# Patient Record
Sex: MALE | Race: WHITE | NOT HISPANIC OR LATINO
[De-identification: names, ages, dates, MRNs, and addresses within clinical notes are randomized per-mention and may not be internally consistent; named-entity substitution may affect disease eponyms.]

---

## 2017-01-16 ENCOUNTER — RX RENEWAL (OUTPATIENT)
Age: 77
End: 2017-01-16

## 2017-02-15 ENCOUNTER — APPOINTMENT (OUTPATIENT)
Dept: INTERNAL MEDICINE | Facility: CLINIC | Age: 77
End: 2017-02-15

## 2017-02-15 ENCOUNTER — LABORATORY RESULT (OUTPATIENT)
Age: 77
End: 2017-02-15

## 2017-02-15 VITALS
SYSTOLIC BLOOD PRESSURE: 130 MMHG | WEIGHT: 180 LBS | DIASTOLIC BLOOD PRESSURE: 78 MMHG | HEIGHT: 66.5 IN | BODY MASS INDEX: 28.59 KG/M2 | HEART RATE: 70 BPM

## 2017-02-16 ENCOUNTER — RESULT REVIEW (OUTPATIENT)
Age: 77
End: 2017-02-16

## 2017-02-16 LAB
ALBUMIN SERPL ELPH-MCNC: 4.2 G/DL
ALP BLD-CCNC: 45 U/L
ALT SERPL-CCNC: 30 U/L
ANION GAP SERPL CALC-SCNC: 16 MMOL/L
AST SERPL-CCNC: 21 U/L
BASOPHILS # BLD AUTO: 0.2 K/UL
BASOPHILS NFR BLD AUTO: 2.7 %
BILIRUB SERPL-MCNC: 0.4 MG/DL
BUN SERPL-MCNC: 22 MG/DL
CALCIUM SERPL-MCNC: 9.8 MG/DL
CHLORIDE SERPL-SCNC: 102 MMOL/L
CHOLEST SERPL-MCNC: 148 MG/DL
CHOLEST/HDLC SERPL: 4.4 RATIO
CO2 SERPL-SCNC: 23 MMOL/L
CREAT SERPL-MCNC: 0.96 MG/DL
EOSINOPHIL # BLD AUTO: 0.27 K/UL
EOSINOPHIL NFR BLD AUTO: 3.6 %
GLUCOSE SERPL-MCNC: 130 MG/DL
HBA1C MFR BLD HPLC: 8.9 %
HCT VFR BLD CALC: 40.9 %
HDLC SERPL-MCNC: 34 MG/DL
HGB BLD-MCNC: 13.7 G/DL
LDLC SERPL CALC-MCNC: 73 MG/DL
LYMPHOCYTES # BLD AUTO: 1.4 K/UL
LYMPHOCYTES NFR BLD AUTO: 18.9 %
MAGNESIUM SERPL-MCNC: 1.7 MG/DL
MAN DIFF?: NORMAL
MCHC RBC-ENTMCNC: 29.1 PG
MCHC RBC-ENTMCNC: 33.5 GM/DL
MCV RBC AUTO: 87 FL
MONOCYTES # BLD AUTO: 0.33 K/UL
MONOCYTES NFR BLD AUTO: 4.5 %
NEUTROPHILS # BLD AUTO: 5.13 K/UL
NEUTROPHILS NFR BLD AUTO: 67.6 %
PLATELET # BLD AUTO: 180 K/UL
POTASSIUM SERPL-SCNC: 3.6 MMOL/L
PROT SERPL-MCNC: 7 G/DL
RBC # BLD: 4.7 M/UL
RBC # FLD: 15.1 %
SODIUM SERPL-SCNC: 141 MMOL/L
TRIGL SERPL-MCNC: 203 MG/DL
URATE SERPL-MCNC: 5.6 MG/DL
WBC # FLD AUTO: 7.39 K/UL

## 2017-03-06 ENCOUNTER — RX RENEWAL (OUTPATIENT)
Age: 77
End: 2017-03-06

## 2017-03-16 ENCOUNTER — RX RENEWAL (OUTPATIENT)
Age: 77
End: 2017-03-16

## 2017-04-06 RX ORDER — SITAGLIPTIN 100 MG/1
100 TABLET, FILM COATED ORAL
Qty: 90 | Refills: 1 | Status: DISCONTINUED | COMMUNITY
Start: 2017-03-23 | End: 2017-04-06

## 2017-06-05 ENCOUNTER — APPOINTMENT (OUTPATIENT)
Dept: INTERNAL MEDICINE | Facility: CLINIC | Age: 77
End: 2017-06-05

## 2017-08-15 ENCOUNTER — NON-APPOINTMENT (OUTPATIENT)
Age: 77
End: 2017-08-15

## 2017-08-15 ENCOUNTER — APPOINTMENT (OUTPATIENT)
Dept: INTERNAL MEDICINE | Facility: CLINIC | Age: 77
End: 2017-08-15
Payer: MEDICARE

## 2017-08-15 VITALS
WEIGHT: 175 LBS | HEIGHT: 66.5 IN | HEART RATE: 72 BPM | BODY MASS INDEX: 27.79 KG/M2 | DIASTOLIC BLOOD PRESSURE: 86 MMHG | RESPIRATION RATE: 14 BRPM | SYSTOLIC BLOOD PRESSURE: 128 MMHG

## 2017-08-15 DIAGNOSIS — I49.3 VENTRICULAR PREMATURE DEPOLARIZATION: ICD-10-CM

## 2017-08-15 PROCEDURE — 93000 ELECTROCARDIOGRAM COMPLETE: CPT

## 2017-08-15 PROCEDURE — G0439: CPT

## 2017-08-15 PROCEDURE — 36415 COLL VENOUS BLD VENIPUNCTURE: CPT

## 2017-08-15 RX ORDER — PROMETHAZINE HYDROCHLORIDE AND CODEINE PHOSPHATE 6.25; 1 MG/5ML; MG/5ML
6.25-1 SOLUTION ORAL
Qty: 1 | Refills: 0 | Status: DISCONTINUED | COMMUNITY
Start: 2017-02-16 | End: 2017-08-15

## 2017-08-17 ENCOUNTER — RESULT REVIEW (OUTPATIENT)
Age: 77
End: 2017-08-17

## 2017-08-17 LAB
ALBUMIN SERPL ELPH-MCNC: 4.5 G/DL
ALP BLD-CCNC: 45 U/L
ALT SERPL-CCNC: 26 U/L
ANION GAP SERPL CALC-SCNC: 20 MMOL/L
APPEARANCE: CLEAR
AST SERPL-CCNC: 23 U/L
BACTERIA: NEGATIVE
BASOPHILS # BLD AUTO: 0.04 K/UL
BASOPHILS NFR BLD AUTO: 0.5 %
BILIRUB SERPL-MCNC: 0.4 MG/DL
BILIRUBIN URINE: NEGATIVE
BLOOD URINE: NEGATIVE
BUN SERPL-MCNC: 24 MG/DL
CALCIUM SERPL-MCNC: 9.7 MG/DL
CHLORIDE SERPL-SCNC: 100 MMOL/L
CHOLEST SERPL-MCNC: 150 MG/DL
CHOLEST/HDLC SERPL: 5 RATIO
CO2 SERPL-SCNC: 20 MMOL/L
COLOR: YELLOW
CREAT SERPL-MCNC: 0.95 MG/DL
CREAT SPEC-SCNC: 83 MG/DL
EOSINOPHIL # BLD AUTO: 0.36 K/UL
EOSINOPHIL NFR BLD AUTO: 4.7 %
GLUCOSE QUALITATIVE U: 250 MG/DL
GLUCOSE SERPL-MCNC: 267 MG/DL
HBA1C MFR BLD HPLC: 9.5 %
HCT VFR BLD CALC: 41.2 %
HDLC SERPL-MCNC: 30 MG/DL
HGB BLD-MCNC: 14.3 G/DL
IMM GRANULOCYTES NFR BLD AUTO: 0.5 %
KETONES URINE: NEGATIVE
LDLC SERPL CALC-MCNC: 71 MG/DL
LEUKOCYTE ESTERASE URINE: NEGATIVE
LYMPHOCYTES # BLD AUTO: 1.43 K/UL
LYMPHOCYTES NFR BLD AUTO: 18.9 %
MAGNESIUM SERPL-MCNC: 1.5 MG/DL
MAN DIFF?: NORMAL
MCHC RBC-ENTMCNC: 28.9 PG
MCHC RBC-ENTMCNC: 34.7 GM/DL
MCV RBC AUTO: 83.2 FL
MICROALBUMIN 24H UR DL<=1MG/L-MCNC: 3.2 MG/DL
MICROALBUMIN/CREAT 24H UR-RTO: 39 MG/G
MICROSCOPIC-UA: NORMAL
MONOCYTES # BLD AUTO: 0.67 K/UL
MONOCYTES NFR BLD AUTO: 8.8 %
NEUTROPHILS # BLD AUTO: 5.04 K/UL
NEUTROPHILS NFR BLD AUTO: 66.6 %
NITRITE URINE: NEGATIVE
PH URINE: 5
PLATELET # BLD AUTO: 181 K/UL
POTASSIUM SERPL-SCNC: 4.3 MMOL/L
PROT SERPL-MCNC: 7.4 G/DL
PROTEIN URINE: NEGATIVE MG/DL
RBC # BLD: 4.95 M/UL
RBC # FLD: 14.1 %
RED BLOOD CELLS URINE: 0 /HPF
SODIUM SERPL-SCNC: 140 MMOL/L
SPECIFIC GRAVITY URINE: 1.02
SQUAMOUS EPITHELIAL CELLS: 0 /HPF
TRIGL SERPL-MCNC: 244 MG/DL
UROBILINOGEN URINE: NORMAL MG/DL
WBC # FLD AUTO: 7.58 K/UL
WHITE BLOOD CELLS URINE: 0 /HPF

## 2017-09-14 ENCOUNTER — RX RENEWAL (OUTPATIENT)
Age: 77
End: 2017-09-14

## 2017-09-19 ENCOUNTER — RX RENEWAL (OUTPATIENT)
Age: 77
End: 2017-09-19

## 2017-10-10 ENCOUNTER — RX RENEWAL (OUTPATIENT)
Age: 77
End: 2017-10-10

## 2017-11-06 ENCOUNTER — FORM ENCOUNTER (OUTPATIENT)
Age: 77
End: 2017-11-06

## 2017-11-06 ENCOUNTER — LABORATORY RESULT (OUTPATIENT)
Age: 77
End: 2017-11-06

## 2017-11-06 ENCOUNTER — APPOINTMENT (OUTPATIENT)
Dept: RHEUMATOLOGY | Facility: CLINIC | Age: 77
End: 2017-11-06
Payer: MEDICARE

## 2017-11-06 VITALS
HEART RATE: 83 BPM | WEIGHT: 175 LBS | OXYGEN SATURATION: 98 % | BODY MASS INDEX: 27.79 KG/M2 | HEIGHT: 66.5 IN | RESPIRATION RATE: 16 BRPM | SYSTOLIC BLOOD PRESSURE: 138 MMHG | TEMPERATURE: 97.2 F | DIASTOLIC BLOOD PRESSURE: 82 MMHG

## 2017-11-06 DIAGNOSIS — M25.50 PAIN IN UNSPECIFIED JOINT: ICD-10-CM

## 2017-11-06 DIAGNOSIS — Z87.19 PERSONAL HISTORY OF OTHER DISEASES OF THE DIGESTIVE SYSTEM: ICD-10-CM

## 2017-11-06 DIAGNOSIS — M19.90 UNSPECIFIED OSTEOARTHRITIS, UNSPECIFIED SITE: ICD-10-CM

## 2017-11-06 DIAGNOSIS — Z82.49 FAMILY HISTORY OF ISCHEMIC HEART DISEASE AND OTHER DISEASES OF THE CIRCULATORY SYSTEM: ICD-10-CM

## 2017-11-06 DIAGNOSIS — Z87.39 PERSONAL HISTORY OF OTHER DISEASES OF THE MUSCULOSKELETAL SYSTEM AND CONNECTIVE TISSUE: ICD-10-CM

## 2017-11-06 DIAGNOSIS — Z86.39 PERSONAL HISTORY OF OTHER ENDOCRINE, NUTRITIONAL AND METABOLIC DISEASE: ICD-10-CM

## 2017-11-06 DIAGNOSIS — Z87.891 PERSONAL HISTORY OF NICOTINE DEPENDENCE: ICD-10-CM

## 2017-11-06 PROCEDURE — 99205 OFFICE O/P NEW HI 60 MIN: CPT | Mod: 25

## 2017-11-06 PROCEDURE — 36415 COLL VENOUS BLD VENIPUNCTURE: CPT

## 2017-11-06 PROCEDURE — 85651 RBC SED RATE NONAUTOMATED: CPT

## 2017-11-07 ENCOUNTER — APPOINTMENT (OUTPATIENT)
Dept: RADIOLOGY | Facility: CLINIC | Age: 77
End: 2017-11-07

## 2017-11-07 ENCOUNTER — OUTPATIENT (OUTPATIENT)
Dept: OUTPATIENT SERVICES | Facility: HOSPITAL | Age: 77
LOS: 1 days | End: 2017-11-07
Payer: MEDICARE

## 2017-11-07 ENCOUNTER — RX RENEWAL (OUTPATIENT)
Age: 77
End: 2017-11-07

## 2017-11-07 DIAGNOSIS — M19.90 UNSPECIFIED OSTEOARTHRITIS, UNSPECIFIED SITE: ICD-10-CM

## 2017-11-07 PROBLEM — Z86.39 HISTORY OF DIABETES MELLITUS: Status: RESOLVED | Noted: 2017-11-06 | Resolved: 2017-11-07

## 2017-11-07 PROBLEM — Z87.39 HISTORY OF GOUT: Status: RESOLVED | Noted: 2017-11-06 | Resolved: 2017-11-07

## 2017-11-07 PROBLEM — Z87.19 HISTORY OF GASTROESOPHAGEAL REFLUX (GERD): Status: RESOLVED | Noted: 2017-11-06 | Resolved: 2017-11-07

## 2017-11-07 PROBLEM — Z82.49 FAMILY HISTORY OF CARDIAC DISORDER: Status: ACTIVE | Noted: 2017-11-06

## 2017-11-07 LAB
ALBUMIN SERPL ELPH-MCNC: 4.7 G/DL
ALP BLD-CCNC: 49 U/L
ALT SERPL-CCNC: 25 U/L
ANA SER IF-ACNC: NEGATIVE
ANION GAP SERPL CALC-SCNC: 18 MMOL/L
APPEARANCE: CLEAR
AST SERPL-CCNC: 20 U/L
B BURGDOR IGG+IGM SER QL IB: NORMAL
BACTERIA: NEGATIVE
BASOPHILS # BLD AUTO: 0.03 K/UL
BASOPHILS NFR BLD AUTO: 0.5 %
BILIRUB SERPL-MCNC: 0.4 MG/DL
BILIRUBIN URINE: NEGATIVE
BLOOD URINE: NEGATIVE
BUN SERPL-MCNC: 21 MG/DL
C3 SERPL-MCNC: 122 MG/DL
C4 SERPL-MCNC: 31 MG/DL
CALCIUM SERPL-MCNC: 10.6 MG/DL
CARDIOLIPIN AB SER IA-ACNC: NEGATIVE
CCP AB SER IA-ACNC: <8 UNITS
CHLORIDE SERPL-SCNC: 98 MMOL/L
CK SERPL-CCNC: 75 U/L
CO2 SERPL-SCNC: 23 MMOL/L
COLOR: YELLOW
CREAT SERPL-MCNC: 0.97 MG/DL
CRP SERPL-MCNC: 0.4 MG/DL
DEPRECATED KAPPA LC FREE/LAMBDA SER: 0.95 RATIO
DSDNA AB SER-ACNC: 33 IU/ML
EOSINOPHIL # BLD AUTO: 0.34 K/UL
EOSINOPHIL NFR BLD AUTO: 5.7 %
GLUCOSE QUALITATIVE U: 1000 MG/DL
GLUCOSE SERPL-MCNC: 319 MG/DL
HAV IGM SER QL: NONREACTIVE
HBV CORE IGM SER QL: NONREACTIVE
HBV SURFACE AG SER QL: NONREACTIVE
HCT VFR BLD CALC: 41.5 %
HCV AB SER QL: NONREACTIVE
HCV S/CO RATIO: 0.1 S/CO
HGB BLD-MCNC: 14.5 G/DL
IGA SER QL IEP: 332 MG/DL
IGG SER QL IEP: 917 MG/DL
IGM SER QL IEP: 58 MG/DL
IMM GRANULOCYTES NFR BLD AUTO: 0.7 %
KAPPA LC CSF-MCNC: 2.72 MG/DL
KAPPA LC SERPL-MCNC: 2.59 MG/DL
KETONES URINE: NEGATIVE
LDH SERPL-CCNC: 162 U/L
LEUKOCYTE ESTERASE URINE: NEGATIVE
LYMPHOCYTES # BLD AUTO: 1.52 K/UL
LYMPHOCYTES NFR BLD AUTO: 25.4 %
M PROTEIN SPEC IFE-MCNC: NORMAL
MAGNESIUM SERPL-MCNC: 1.6 MG/DL
MAN DIFF?: NORMAL
MCHC RBC-ENTMCNC: 29.4 PG
MCHC RBC-ENTMCNC: 34.9 GM/DL
MCV RBC AUTO: 84.2 FL
MICROSCOPIC-UA: NORMAL
MONOCYTES # BLD AUTO: 0.59 K/UL
MONOCYTES NFR BLD AUTO: 9.8 %
NEUTROPHILS # BLD AUTO: 3.47 K/UL
NEUTROPHILS NFR BLD AUTO: 57.9 %
NITRITE URINE: NEGATIVE
PH URINE: 6.5
PHOSPHATE SERPL-MCNC: 2.9 MG/DL
PLATELET # BLD AUTO: 160 K/UL
POTASSIUM SERPL-SCNC: 4.5 MMOL/L
PROT SERPL-MCNC: 7.4 G/DL
PROTEIN URINE: NEGATIVE MG/DL
RBC # BLD: 4.93 M/UL
RBC # FLD: 13.9 %
RED BLOOD CELLS URINE: 0 /HPF
RF+CCP IGG SER-IMP: NEGATIVE
RHEUMATOID FACT SER QL: <7 IU/ML
SODIUM SERPL-SCNC: 139 MMOL/L
SPECIFIC GRAVITY URINE: 1.02
SQUAMOUS EPITHELIAL CELLS: 0 /HPF
THYROGLOB AB SERPL-ACNC: <20 IU/ML
THYROPEROXIDASE AB SERPL IA-ACNC: 22.8 IU/ML
TSH SERPL-ACNC: 1.86 UIU/ML
URATE SERPL-MCNC: 4.8 MG/DL
UROBILINOGEN URINE: NEGATIVE MG/DL
WBC # FLD AUTO: 5.99 K/UL
WESR: 9
WHITE BLOOD CELLS URINE: 0 /HPF

## 2017-11-07 PROCEDURE — 73110 X-RAY EXAM OF WRIST: CPT

## 2017-11-07 PROCEDURE — 72202 X-RAY EXAM SI JOINTS 3/> VWS: CPT

## 2017-11-07 PROCEDURE — 72202 X-RAY EXAM SI JOINTS 3/> VWS: CPT | Mod: 26

## 2017-11-07 PROCEDURE — 71020: CPT | Mod: 26

## 2017-11-07 PROCEDURE — 73522 X-RAY EXAM HIPS BI 3-4 VIEWS: CPT | Mod: 26

## 2017-11-07 PROCEDURE — 73522 X-RAY EXAM HIPS BI 3-4 VIEWS: CPT

## 2017-11-07 PROCEDURE — 71046 X-RAY EXAM CHEST 2 VIEWS: CPT

## 2017-11-07 PROCEDURE — 73630 X-RAY EXAM OF FOOT: CPT

## 2017-11-07 PROCEDURE — 73130 X-RAY EXAM OF HAND: CPT

## 2017-11-09 LAB
ENA JO1 AB SER IA-ACNC: <0.2 AL
ENA RNP AB SER IA-ACNC: 0.8 AL
ENA SCL70 IGG SER IA-ACNC: <0.2 AL
ENA SM AB SER IA-ACNC: <0.2 AL
ENA SS-A AB SER IA-ACNC: <0.2 AL
ENA SS-B AB SER IA-ACNC: <0.2 AL

## 2017-11-16 ENCOUNTER — MEDICATION RENEWAL (OUTPATIENT)
Age: 77
End: 2017-11-16

## 2017-11-17 ENCOUNTER — APPOINTMENT (OUTPATIENT)
Dept: CHRONIC DISEASE MANAGEMENT | Facility: CLINIC | Age: 77
End: 2017-11-17

## 2017-11-20 DIAGNOSIS — M53.3 SACROCOCCYGEAL DISORDERS, NOT ELSEWHERE CLASSIFIED: ICD-10-CM

## 2017-11-20 DIAGNOSIS — M18.11 UNILATERAL PRIMARY OSTEOARTHRITIS OF FIRST CARPOMETACARPAL JOINT, RIGHT HAND: ICD-10-CM

## 2017-11-20 DIAGNOSIS — R10.2 PELVIC AND PERINEAL PAIN: ICD-10-CM

## 2017-11-20 DIAGNOSIS — M18.12 UNILATERAL PRIMARY OSTEOARTHRITIS OF FIRST CARPOMETACARPAL JOINT, LEFT HAND: ICD-10-CM

## 2017-11-20 DIAGNOSIS — M19.042 PRIMARY OSTEOARTHRITIS, LEFT HAND: ICD-10-CM

## 2017-11-20 DIAGNOSIS — R07.9 CHEST PAIN, UNSPECIFIED: ICD-10-CM

## 2017-11-20 DIAGNOSIS — M19.041 PRIMARY OSTEOARTHRITIS, RIGHT HAND: ICD-10-CM

## 2017-11-29 ENCOUNTER — APPOINTMENT (OUTPATIENT)
Dept: INTERNAL MEDICINE | Facility: CLINIC | Age: 77
End: 2017-11-29
Payer: MEDICARE

## 2017-11-29 VITALS
HEART RATE: 84 BPM | BODY MASS INDEX: 28.43 KG/M2 | DIASTOLIC BLOOD PRESSURE: 80 MMHG | OXYGEN SATURATION: 96 % | WEIGHT: 179 LBS | HEIGHT: 66.5 IN | SYSTOLIC BLOOD PRESSURE: 124 MMHG

## 2017-11-29 PROCEDURE — 99214 OFFICE O/P EST MOD 30 MIN: CPT | Mod: 25

## 2017-11-29 PROCEDURE — 36415 COLL VENOUS BLD VENIPUNCTURE: CPT

## 2017-12-01 ENCOUNTER — RESULT REVIEW (OUTPATIENT)
Age: 77
End: 2017-12-01

## 2017-12-01 LAB
ALBUMIN SERPL ELPH-MCNC: 4.6 G/DL
ALP BLD-CCNC: 48 U/L
ALT SERPL-CCNC: 29 U/L
ANION GAP SERPL CALC-SCNC: 16 MMOL/L
AST SERPL-CCNC: 24 U/L
BASOPHILS # BLD AUTO: 0.04 K/UL
BASOPHILS NFR BLD AUTO: 0.5 %
BILIRUB SERPL-MCNC: 0.5 MG/DL
BUN SERPL-MCNC: 21 MG/DL
CALCIUM SERPL-MCNC: 9.7 MG/DL
CHLORIDE SERPL-SCNC: 96 MMOL/L
CHOLEST SERPL-MCNC: 177 MG/DL
CHOLEST/HDLC SERPL: 6.3 RATIO
CO2 SERPL-SCNC: 24 MMOL/L
CREAT SERPL-MCNC: 1.03 MG/DL
EOSINOPHIL # BLD AUTO: 0.45 K/UL
EOSINOPHIL NFR BLD AUTO: 5.6 %
GLUCOSE SERPL-MCNC: 241 MG/DL
HBA1C MFR BLD HPLC: 9.7 %
HCT VFR BLD CALC: 42.5 %
HDLC SERPL-MCNC: 28 MG/DL
HGB BLD-MCNC: 14.6 G/DL
IMM GRANULOCYTES NFR BLD AUTO: 0.3 %
LDLC SERPL CALC-MCNC: 77 MG/DL
LYMPHOCYTES # BLD AUTO: 1.62 K/UL
LYMPHOCYTES NFR BLD AUTO: 20.3 %
MAGNESIUM SERPL-MCNC: 1.6 MG/DL
MAN DIFF?: NORMAL
MCHC RBC-ENTMCNC: 29.3 PG
MCHC RBC-ENTMCNC: 34.4 GM/DL
MCV RBC AUTO: 85.2 FL
MONOCYTES # BLD AUTO: 0.76 K/UL
MONOCYTES NFR BLD AUTO: 9.5 %
NEUTROPHILS # BLD AUTO: 5.08 K/UL
NEUTROPHILS NFR BLD AUTO: 63.8 %
PLATELET # BLD AUTO: 156 K/UL
POTASSIUM SERPL-SCNC: 3.9 MMOL/L
PROT SERPL-MCNC: 7.3 G/DL
RBC # BLD: 4.99 M/UL
RBC # FLD: 13.9 %
SODIUM SERPL-SCNC: 136 MMOL/L
TRIGL SERPL-MCNC: 360 MG/DL
URATE SERPL-MCNC: 5.6 MG/DL
WBC # FLD AUTO: 7.97 K/UL

## 2017-12-06 ENCOUNTER — MEDICATION RENEWAL (OUTPATIENT)
Age: 77
End: 2017-12-06

## 2017-12-29 LAB
CREAT 24H UR-MCNC: 1.5 G/24 H
CREAT ?TM UR-MCNC: 64 MG/DL
PROT 24H UR-MRATE: 8 MG/DL
PROT ?TM UR-MCNC: 24 HR
PROT UR-MCNC: 184 MG/24 H
SPECIMEN VOL 24H UR: 2300 ML

## 2018-01-03 ENCOUNTER — RESULT REVIEW (OUTPATIENT)
Age: 78
End: 2018-01-03

## 2018-01-05 ENCOUNTER — APPOINTMENT (OUTPATIENT)
Dept: RHEUMATOLOGY | Facility: CLINIC | Age: 78
End: 2018-01-05
Payer: MEDICARE

## 2018-01-05 VITALS — DIASTOLIC BLOOD PRESSURE: 80 MMHG | RESPIRATION RATE: 16 BRPM | HEART RATE: 72 BPM | SYSTOLIC BLOOD PRESSURE: 125 MMHG

## 2018-01-05 DIAGNOSIS — M15.9 POLYOSTEOARTHRITIS, UNSPECIFIED: ICD-10-CM

## 2018-01-05 DIAGNOSIS — M79.641 PAIN IN RIGHT HAND: ICD-10-CM

## 2018-01-05 DIAGNOSIS — M79.642 PAIN IN RIGHT HAND: ICD-10-CM

## 2018-01-05 DIAGNOSIS — R20.2 PARESTHESIA OF SKIN: ICD-10-CM

## 2018-01-05 PROCEDURE — 99214 OFFICE O/P EST MOD 30 MIN: CPT

## 2018-01-06 LAB
FOLATE SERPL-MCNC: >20 NG/ML
RPR SER-TITR: NORMAL
VIT B12 SERPL-MCNC: 505 PG/ML

## 2018-01-06 RX ORDER — ACETAMINOPHEN 500 MG/1
500 TABLET, COATED ORAL
Qty: 100 | Refills: 0 | Status: ACTIVE | COMMUNITY
Start: 2018-01-06

## 2018-02-06 ENCOUNTER — RX RENEWAL (OUTPATIENT)
Age: 78
End: 2018-02-06

## 2018-03-05 ENCOUNTER — RX RENEWAL (OUTPATIENT)
Age: 78
End: 2018-03-05

## 2018-03-09 ENCOUNTER — APPOINTMENT (OUTPATIENT)
Dept: INTERNAL MEDICINE | Facility: CLINIC | Age: 78
End: 2018-03-09
Payer: MEDICARE

## 2018-03-09 VITALS
HEART RATE: 72 BPM | DIASTOLIC BLOOD PRESSURE: 80 MMHG | SYSTOLIC BLOOD PRESSURE: 120 MMHG | WEIGHT: 178 LBS | OXYGEN SATURATION: 98 % | HEIGHT: 66.5 IN | BODY MASS INDEX: 28.27 KG/M2

## 2018-03-09 DIAGNOSIS — R09.82 POSTNASAL DRIP: ICD-10-CM

## 2018-03-09 PROCEDURE — 36415 COLL VENOUS BLD VENIPUNCTURE: CPT

## 2018-03-09 PROCEDURE — 99214 OFFICE O/P EST MOD 30 MIN: CPT | Mod: 25

## 2018-03-12 LAB
ALBUMIN SERPL ELPH-MCNC: 4.3 G/DL
ALP BLD-CCNC: 47 U/L
ALT SERPL-CCNC: 33 U/L
ANION GAP SERPL CALC-SCNC: 17 MMOL/L
AST SERPL-CCNC: 22 U/L
BASOPHILS # BLD AUTO: 0.03 K/UL
BASOPHILS NFR BLD AUTO: 0.5 %
BILIRUB SERPL-MCNC: 0.5 MG/DL
BUN SERPL-MCNC: 26 MG/DL
CALCIUM SERPL-MCNC: 9.4 MG/DL
CHLORIDE SERPL-SCNC: 96 MMOL/L
CHOLEST SERPL-MCNC: 166 MG/DL
CHOLEST/HDLC SERPL: 4.9 RATIO
CO2 SERPL-SCNC: 25 MMOL/L
CREAT SERPL-MCNC: 0.97 MG/DL
EOSINOPHIL # BLD AUTO: 0.51 K/UL
EOSINOPHIL NFR BLD AUTO: 8 %
GLUCOSE SERPL-MCNC: 295 MG/DL
HBA1C MFR BLD HPLC: 10.5 %
HCT VFR BLD CALC: 42.5 %
HDLC SERPL-MCNC: 34 MG/DL
HGB BLD-MCNC: 14.8 G/DL
IMM GRANULOCYTES NFR BLD AUTO: 0.3 %
LDLC SERPL CALC-MCNC: 70 MG/DL
LYMPHOCYTES # BLD AUTO: 1.28 K/UL
LYMPHOCYTES NFR BLD AUTO: 20.2 %
MAGNESIUM SERPL-MCNC: 1.7 MG/DL
MAN DIFF?: NORMAL
MCHC RBC-ENTMCNC: 29.8 PG
MCHC RBC-ENTMCNC: 34.8 GM/DL
MCV RBC AUTO: 85.5 FL
MONOCYTES # BLD AUTO: 0.57 K/UL
MONOCYTES NFR BLD AUTO: 9 %
NEUTROPHILS # BLD AUTO: 3.94 K/UL
NEUTROPHILS NFR BLD AUTO: 62 %
PLATELET # BLD AUTO: 161 K/UL
POTASSIUM SERPL-SCNC: 4 MMOL/L
PROT SERPL-MCNC: 7.2 G/DL
RBC # BLD: 4.97 M/UL
RBC # FLD: 14.8 %
SODIUM SERPL-SCNC: 138 MMOL/L
TRIGL SERPL-MCNC: 310 MG/DL
URATE SERPL-MCNC: 5.5 MG/DL
WBC # FLD AUTO: 6.35 K/UL

## 2018-03-14 ENCOUNTER — RESULT REVIEW (OUTPATIENT)
Age: 78
End: 2018-03-14

## 2018-03-23 ENCOUNTER — RX RENEWAL (OUTPATIENT)
Age: 78
End: 2018-03-23

## 2018-03-30 ENCOUNTER — RX RENEWAL (OUTPATIENT)
Age: 78
End: 2018-03-30

## 2018-04-05 ENCOUNTER — APPOINTMENT (OUTPATIENT)
Dept: RHEUMATOLOGY | Facility: CLINIC | Age: 78
End: 2018-04-05

## 2018-04-06 ENCOUNTER — RX RENEWAL (OUTPATIENT)
Age: 78
End: 2018-04-06

## 2018-04-24 ENCOUNTER — RX RENEWAL (OUTPATIENT)
Age: 78
End: 2018-04-24

## 2018-05-03 ENCOUNTER — RX RENEWAL (OUTPATIENT)
Age: 78
End: 2018-05-03

## 2018-05-14 ENCOUNTER — INPATIENT (INPATIENT)
Facility: HOSPITAL | Age: 78
LOS: 7 days | Discharge: HOME | DRG: 234 | End: 2018-05-22
Attending: THORACIC SURGERY (CARDIOTHORACIC VASCULAR SURGERY) | Admitting: FAMILY MEDICINE
Payer: MEDICARE

## 2018-05-14 VITALS — HEIGHT: 68 IN | WEIGHT: 171.96 LBS

## 2018-05-14 DIAGNOSIS — R07.9 CHEST PAIN, UNSPECIFIED: ICD-10-CM

## 2018-05-14 LAB
ALBUMIN SERPL ELPH-MCNC: 4.2 G/DL — SIGNIFICANT CHANGE UP (ref 3.3–5.2)
ALP SERPL-CCNC: 48 U/L — SIGNIFICANT CHANGE UP (ref 40–120)
ALT FLD-CCNC: 22 U/L — SIGNIFICANT CHANGE UP
ANION GAP SERPL CALC-SCNC: 15 MMOL/L — SIGNIFICANT CHANGE UP (ref 5–17)
APTT BLD: 30.2 SEC — SIGNIFICANT CHANGE UP (ref 27.5–37.4)
AST SERPL-CCNC: 15 U/L — SIGNIFICANT CHANGE UP
BASOPHILS # BLD AUTO: 0 K/UL — SIGNIFICANT CHANGE UP (ref 0–0.2)
BASOPHILS NFR BLD AUTO: 0.3 % — SIGNIFICANT CHANGE UP (ref 0–2)
BILIRUB SERPL-MCNC: 0.3 MG/DL — LOW (ref 0.4–2)
BUN SERPL-MCNC: 22 MG/DL — HIGH (ref 8–20)
CALCIUM SERPL-MCNC: 9.9 MG/DL — SIGNIFICANT CHANGE UP (ref 8.6–10.2)
CHLORIDE SERPL-SCNC: 100 MMOL/L — SIGNIFICANT CHANGE UP (ref 98–107)
CK SERPL-CCNC: 64 U/L — SIGNIFICANT CHANGE UP (ref 30–200)
CO2 SERPL-SCNC: 23 MMOL/L — SIGNIFICANT CHANGE UP (ref 22–29)
CREAT SERPL-MCNC: 0.81 MG/DL — SIGNIFICANT CHANGE UP (ref 0.5–1.3)
EOSINOPHIL # BLD AUTO: 0.2 K/UL — SIGNIFICANT CHANGE UP (ref 0–0.5)
EOSINOPHIL NFR BLD AUTO: 2.7 % — SIGNIFICANT CHANGE UP (ref 0–5)
GLUCOSE SERPL-MCNC: 213 MG/DL — HIGH (ref 70–115)
HCT VFR BLD CALC: 40.7 % — LOW (ref 42–52)
HGB BLD-MCNC: 14 G/DL — SIGNIFICANT CHANGE UP (ref 14–18)
INR BLD: 1.14 RATIO — SIGNIFICANT CHANGE UP (ref 0.88–1.16)
LYMPHOCYTES # BLD AUTO: 1 K/UL — SIGNIFICANT CHANGE UP (ref 1–4.8)
LYMPHOCYTES # BLD AUTO: 13.8 % — LOW (ref 20–55)
MCHC RBC-ENTMCNC: 29.4 PG — SIGNIFICANT CHANGE UP (ref 27–31)
MCHC RBC-ENTMCNC: 34.4 G/DL — SIGNIFICANT CHANGE UP (ref 32–36)
MCV RBC AUTO: 85.5 FL — SIGNIFICANT CHANGE UP (ref 80–94)
MONOCYTES # BLD AUTO: 0.7 K/UL — SIGNIFICANT CHANGE UP (ref 0–0.8)
MONOCYTES NFR BLD AUTO: 9.2 % — SIGNIFICANT CHANGE UP (ref 3–10)
NEUTROPHILS # BLD AUTO: 5.2 K/UL — SIGNIFICANT CHANGE UP (ref 1.8–8)
NEUTROPHILS NFR BLD AUTO: 73.4 % — HIGH (ref 37–73)
PLATELET # BLD AUTO: 145 K/UL — LOW (ref 150–400)
POTASSIUM SERPL-MCNC: 3.7 MMOL/L — SIGNIFICANT CHANGE UP (ref 3.5–5.3)
POTASSIUM SERPL-SCNC: 3.7 MMOL/L — SIGNIFICANT CHANGE UP (ref 3.5–5.3)
PROT SERPL-MCNC: 7.1 G/DL — SIGNIFICANT CHANGE UP (ref 6.6–8.7)
PROTHROM AB SERPL-ACNC: 12.6 SEC — SIGNIFICANT CHANGE UP (ref 9.8–12.7)
RBC # BLD: 4.76 M/UL — SIGNIFICANT CHANGE UP (ref 4.6–6.2)
RBC # FLD: 14.2 % — SIGNIFICANT CHANGE UP (ref 11–15.6)
SODIUM SERPL-SCNC: 138 MMOL/L — SIGNIFICANT CHANGE UP (ref 135–145)
TROPONIN T SERPL-MCNC: <0.01 NG/ML — SIGNIFICANT CHANGE UP (ref 0–0.06)
WBC # BLD: 7.2 K/UL — SIGNIFICANT CHANGE UP (ref 4.8–10.8)
WBC # FLD AUTO: 7.2 K/UL — SIGNIFICANT CHANGE UP (ref 4.8–10.8)

## 2018-05-14 PROCEDURE — 99223 1ST HOSP IP/OBS HIGH 75: CPT

## 2018-05-14 PROCEDURE — 99285 EMERGENCY DEPT VISIT HI MDM: CPT

## 2018-05-14 PROCEDURE — 93010 ELECTROCARDIOGRAM REPORT: CPT

## 2018-05-14 PROCEDURE — 71045 X-RAY EXAM CHEST 1 VIEW: CPT | Mod: 26

## 2018-05-14 RX ORDER — SODIUM CHLORIDE 9 MG/ML
3 INJECTION INTRAMUSCULAR; INTRAVENOUS; SUBCUTANEOUS ONCE
Qty: 0 | Refills: 0 | Status: COMPLETED | OUTPATIENT
Start: 2018-05-14 | End: 2018-05-14

## 2018-05-14 RX ORDER — METFORMIN HYDROCHLORIDE 850 MG/1
0 TABLET ORAL
Qty: 0 | Refills: 0 | COMMUNITY

## 2018-05-14 RX ORDER — OMEPRAZOLE 10 MG/1
20 CAPSULE, DELAYED RELEASE ORAL
Qty: 0 | Refills: 0 | COMMUNITY

## 2018-05-14 RX ORDER — OMEPRAZOLE 10 MG/1
0 CAPSULE, DELAYED RELEASE ORAL
Qty: 0 | Refills: 0 | COMMUNITY

## 2018-05-14 RX ORDER — ENOXAPARIN SODIUM 100 MG/ML
40 INJECTION SUBCUTANEOUS DAILY
Qty: 0 | Refills: 0 | Status: DISCONTINUED | OUTPATIENT
Start: 2018-05-14 | End: 2018-05-16

## 2018-05-14 RX ORDER — INSULIN LISPRO 100/ML
VIAL (ML) SUBCUTANEOUS
Qty: 0 | Refills: 0 | Status: DISCONTINUED | OUTPATIENT
Start: 2018-05-14 | End: 2018-05-16

## 2018-05-14 RX ADMIN — SODIUM CHLORIDE 3 MILLILITER(S): 9 INJECTION INTRAMUSCULAR; INTRAVENOUS; SUBCUTANEOUS at 22:11

## 2018-05-14 NOTE — ED PROVIDER NOTE - CHPI ED SYMPTOMS NEG
no diaphoresis/no shortness of breath/no fever/difficulty breathing, abdominal pain, headaches/no chills/no cough

## 2018-05-14 NOTE — H&P ADULT - HISTORY OF PRESENT ILLNESS
78 years old male with PMH of DM, HTN, HLD, GERD and Gout comes with chest pain. As per patient, anupama started yesterday night - it was in whole chest 78 years old male with PMH of DM, HTN, HLD, GERD and Gout comes with chest pain. As per patient, anupama started yesterday night - it was generalized and 7-8/10 in intensity. Denies any palpitations, shortness of breath, cough, headache, dizziness, nausea, vomiting or diaphoresis. He noticed pain was worsening if he stands up and relives with sitting down. Denies fever, sick contacts, recent travel or eating anything different.   As pain was generalized so he thought it could be from his lungs and went to sleep. Today, his wife forced him to get evaluated so he went [place] [facility] [place] abnormal EKG so he spoke to his Cardiologist who recommended coming to ER.  He recently had NST and it was equivocal.   He lifted his 2 grand children day before yesterday and lifted a heavy table few days ago. 78 years old male with PMH of DM, HTN, HLD, GERD and Gout comes with chest pain. As per patient, anupama started yesterday night - it was generalized and 7-8/10 in intensity. Denies any palpitations, shortness of breath, cough, headache, dizziness, nausea, vomiting or diaphoresis. He noticed pain was worsening if he stands up and relives with sitting down. Denies fever, sick contacts, recent travel or eating anything different.   He lifted his 2 grand children day before yesterday and lifted a heavy table few days ago. As pain was generalized so he thought it could be from his lungs and went to sleep. Today, his wife forced him to get evaluated so [place] [facility] [place] so he spoke to his Cardiologist who recommended coming to ER.  He recently had NST and it was equivocal. 78 years old male with PMH of DM, HTN, HLD, GERD and Gout comes with chest pain. As per patient, pain started yesterday night - it was generalized and 7-8/10 in intensity. He noticed pain was worsening if he stands up and relieves with sitting down. He lifted his 2 grand children day before yesterday and lifted a heavy table few days ago. Denies any palpitations, shortness of breath, cough, headache, dizziness, nausea, vomiting or diaphoresis.  Denies fever, sick contacts, recent travel or eating anything different. As pain was generalized so he thought it could be from his lungs and went to sleep. Today, his wife forced him to get evaluated so [place] [facility] [place] which was abnormal so he spoke to his Cardiologist who recommended coming to ER.  He recently had NST and it was equivocal. 78 years old male with PMH of DM, HTN, HLD, GERD and Gout comes with chest pain. As per patient, pain started yesterday night - it was generalized and 7-8/10 in intensity. Aggravated with standing and relieved with sitting. He lifted his 2 grand children day before yesterday and lifted a heavy table few days ago. Denies any palpitations, shortness of breath, cough, headache, dizziness, nausea, vomiting or diaphoresis.  Denies fever, sick contacts, recent travel or eating anything different. As pain was generalized so he thought it could be from his lungs and went to sleep. Today, his wife forced him to get evaluated so he went [place] [facility] [place] he had EKG which was abnormal so he spoke to his Cardiologist who recommended coming to ER.  He recently had NST and it was equivocal.

## 2018-05-14 NOTE — ED PROVIDER NOTE - OBJECTIVE STATEMENT
77 y/o M with PMHx of DM (on Metformin) presents to ED c/o pain in the L shoulder/back that radiated to the chest and "felt like there was pressure on my lungs" onset last night. Currently the patient states he occasionally feels the pain in his chest but not as severe as when it first began. Denies fevers, chills, recent illness, cough, shortness of breath, difficulty breathing, diaphoresis, abdominal pain, nausea, vomiting or headaches. Admits to doing a lot of work around the house and recently lifting a 60 lb table at home. Was seen at [facility] [place] and instructed to come to ED for further eval. No further acute complaints at this time.       Cardiologist: Dr. Orosco- Cardiologist   Had stress test 2 weeks ago and was told there "was a small blip" but was nothing of immediate concern. 77 y/o M with PMHx of DM (on Metformin) presents to ED c/o pain in the L shoulder/back that radiated to the chest and "felt like there was pressure on my lungs" onset last night. Currently the patient states he occasionally feels the pain in his chest but not as severe as when it first began. Denies fevers, chills, recent illness, cough, shortness of breath, difficulty breathing, diaphoresis, abdominal pain, nausea, vomiting or headaches. Admits to doing a lot of work around the house and recently lifting a 60 lb table at home. [place] [facility] [place], consulted with Dr. Orosco, who recommend patient come to ED for possible cath. No further acute complaints at this time.       Cardiologist: Dr. Orosco- Cardiologist   Had nuclear stress test 2 weeks ago and was told there "was a small blip" but was nothing of immediate concern.

## 2018-05-14 NOTE — H&P ADULT - NSHPPHYSICALEXAM_GEN_ALL_CORE
Vital Signs   T(C): 36.8 (14 May 2018 19:52), Max: 36.8 (14 May 2018 19:52)  T(F): 98.2 (14 May 2018 19:52), Max: 98.2 (14 May 2018 19:52)  HR: 92 (14 May 2018 19:52) (92 - 92)  BP: 167/90 (14 May 2018 19:52) (167/90 - 167/90)  RR: 16 (14 May 2018 19:52) (16 - 16)  SpO2: 96% (14 May 2018 19:52) (96% - 96%)  General: Well developed. Well nourished. No acute distress  HEENT: PERRLA. EOMI. Clear conjunctivae. Moist mucus membrane  Neck: Supple. No JVD. No Thyromegaly   Chest: CTA bilaterally - no wheezing, rales or rhonchi. No chest wall tenderness.  Heart: Normal S1 & S2 with RRR. No murmur.   Abdomen: Soft. Non-tender. Non-distended. + BS  Ext: No pedal edema. No calf tenderness   Neuro: AAO x 3, No focal deficit, CN II-XII grossly WNL and no speech disorder  Skin: Warm and Dry  Psychiatry: Normal mood and affect

## 2018-05-14 NOTE — H&P ADULT - PMH
DM (diabetes mellitus)    GERD (gastroesophageal reflux disease)    Gout    HLD (hyperlipidemia)    HTN (hypertension)

## 2018-05-14 NOTE — H&P ADULT - FAMILY HISTORY
Father  Still living? Unknown  Family history of heart disease, Age at diagnosis: Age Unknown Father  Still living? Unknown  Family history of heart disease, Age at diagnosis: Age Unknown     Sibling  Still living? Unknown  Family history of heart disease, Age at diagnosis: Age Unknown

## 2018-05-14 NOTE — ED PROVIDER NOTE - PROGRESS NOTE DETAILS
d/w stephen Creighton cards, will admit, plan for cath tomorrow; reports nuclear stress was equivocal; given symptoms, will need cath in morning; admitted to hospitalist

## 2018-05-14 NOTE — ED ADULT NURSE NOTE - OBJECTIVE STATEMENT
pt care assumed at 2200, no apparent distress noted at this time. pt received Alert and Oriented to person, place, situation and time laying in bed comfortably. pt c/o cp earlier today and went to urgent care. as per pt urgent care said pt had abnormal EKG. pt is denying cp at this time. states he saw cardio 2 weeks ago and everything was negative. HR is Normal Sinus Rhythm on cardiac monitor, lung sounds are clear b/l, abd is soft and nontender with positive bowel sounds in all four quadrants, skin is warm, dry and appropriate for age and race. pt educated on plan of care, plan of care taught back to RN. proficiency determined from successful pt teach back. will continue to educate pt throughout ED stay.

## 2018-05-14 NOTE — H&P ADULT - ASSESSMENT
78 years old male with PMH of DM, HTN, HLD, GERD and Gout comes with chest pain. As per patient, anupama started yesterday night - it was generalized and 7-8/10 in intensity. Denies any palpitations, shortness of breath, cough, headache, dizziness, nausea, vomiting or diaphoresis. He noticed pain was worsening if he stands up and relives with sitting down. Denies fever, sick contacts, recent travel or eating anything different.   As pain was generalized so he thought it could be from his lungs and went to sleep. Today, his wife forced him to get evaluated so he went to [facility] [place] where he had abnormal EKG so he spoke to his Cardiologist who recommended coming to ER.  He recently had NST and it was equivocal.   He lifted his 2 grand children day before yesterday and lifted a heavy table few days ago. 78 years old male with PMH of DM, HTN, HLD, GERD and Gout comes with chest pain. As per patient, anupama started yesterday night - it was generalized and 7-8/10 in intensity. Denies any palpitations, shortness of breath, cough, headache, dizziness, nausea, vomiting or diaphoresis. He noticed pain was worsening if he stands up and relives with sitting down. Denies fever, sick contacts, recent travel or eating anything different.   He lifted his 2 grand children day before yesterday and lifted a heavy table few days ago. As pain was generalized so he thought it could be from his lungs and went to sleep. Today, his wife forced him to get evaluated so [place] [facility] [place] so he spoke to his Cardiologist who recommended coming to ER.  He recently had NST and it was equivocal.     1) Chest Pain rule out ACS  - Cardiac Enzymes, Lipid Profile and HbA1c  - Recently had ECHO  - Continue Aspirin 81 mg  - Lipitor 40 mg  - Metoprolol 25 mg BID  - ED Physician discussed with Dr. Mandujano -- recommended to keep patient NPO for cath in am  2) DM  - HbA1c  - Accu checks and RISS  3) HTN  - Continue Valsartan 320 mg  - Hold HCTZ as patient is mildly dehydrated  4) HLD  - Lipid Profile  - Lipitor 40 mg  - Omega 3 Fatty Acids   5) GERD  - Protonix 40 mg  6) Gout  - Continue Allopurinol  DVT Prophylaxis -- Lovenox 40 mg 78 years old male with PMH of DM, HTN, HLD, GERD and Gout comes with chest pain. As per patient, pain started yesterday night - it was generalized and 7-8/10 in intensity. He noticed pain was worsening if he stands up and relieves with sitting down. He lifted his 2 grand children day before yesterday and lifted a heavy table few days ago. Denies any palpitations, shortness of breath, cough, headache, dizziness, nausea, vomiting or diaphoresis.  Denies fever, sick contacts, recent travel or eating anything different. As pain was generalized so he thought it could be from his lungs and went to sleep. Today, his wife forced him to get evaluated so he went to [facility] [place] where he had EKG which was abnormal so he spoke to his Cardiologist who recommended coming to ER.  He recently had NST and it was equivocal.     1) Chest Pain rule out ACS  - Cardiac Enzymes, Lipid Profile and HbA1c  - Recently had ECHO  - Continue Aspirin 81 mg  - Lipitor 40 mg  - Metoprolol 25 mg BID  - ED Physician discussed with Dr. Mandujano -- recommended to keep patient NPO for cath in am  2) DM  - HbA1c  - Accu checks and RISS  3) HTN  - Continue Valsartan 320 mg  - Hold HCTZ as patient is mildly dehydrated  4) HLD  - Lipid Profile  - Lipitor 40 mg  - Omega 3 Fatty Acids   5) GERD  - Protonix 40 mg  6) Gout  - Continue Allopurinol  DVT Prophylaxis -- Lovenox 40 mg 78 years old male with PMH of DM, HTN, HLD, GERD and Gout comes with chest pain. As per patient, pain started yesterday night - it was generalized and 7-8/10 in intensity. Aggravated with standing and relieved with sitting. He lifted his 2 grand children day before yesterday and lifted a heavy table few days ago. Denies any palpitations, shortness of breath, cough, headache, dizziness, nausea, vomiting or diaphoresis.  Denies fever, sick contacts, recent travel or eating anything different. As pain was generalized so he thought it could be from his lungs and went to sleep. Today, his wife forced him to get evaluated so [facility] went [facility] where he had EKG which was abnormal so he spoke to his Cardiologist who recommended coming to ER.  He recently had NST and it was equivocal.     1) Chest Pain rule out ACS  - Cardiac Enzymes, Lipid Profile and HbA1c  - Recently had ECHO  - Continue Aspirin 81 mg  - Lipitor 40 mg  - Metoprolol 25 mg BID  - ED Physician discussed with Dr. Mandujano -- recommended to keep patient NPO for cath in am  2) DM  - HbA1c  - Accu checks and RISS  3) HTN  - Continue Valsartan 320 mg  - Hold HCTZ as patient is mildly dehydrated  4) HLD  - Lipid Profile  - Lipitor 40 mg  - Omega 3 Fatty Acids   5) GERD  - Protonix 40 mg  6) Gout  - Continue Allopurinol  DVT Prophylaxis -- Lovenox 40 mg

## 2018-05-14 NOTE — ED ADULT TRIAGE NOTE - CHIEF COMPLAINT QUOTE
patient with chest pain started last night was told to come to ER for evaluation sent by dr Kiran, resolved at this time, denies N/V/SOB

## 2018-05-14 NOTE — H&P ADULT - NSHPLABSRESULTS_GEN_ALL_CORE
LABS:                        14.0   7.2   )-----------( 145      ( 14 May 2018 22:17 )             40.7     05-14    138  |  100  |  22.0<H>  ----------------------------<  213<H>  3.7   |  23.0  |  0.81    Ca    9.9      14 May 2018 22:17    TPro  7.1  /  Alb  4.2  /  TBili  0.3<L>  /  DBili  x   /  AST  15  /  ALT  22  /  AlkPhos  48  05-14    PT/INR - ( 14 May 2018 22:17 )   PT: 12.6 sec;   INR: 1.14 ratio         PTT - ( 14 May 2018 22:17 )  PTT:30.2 sec  CARDIAC MARKERS ( 14 May 2018 22:17 )  x     / <0.01 ng/mL / 64 U/L / x     / x              EKG: NSR at 92 bpm. No acute ST changes.

## 2018-05-15 LAB
ANION GAP SERPL CALC-SCNC: 13 MMOL/L — SIGNIFICANT CHANGE UP (ref 5–17)
BUN SERPL-MCNC: 19 MG/DL — SIGNIFICANT CHANGE UP (ref 8–20)
CALCIUM SERPL-MCNC: 9.5 MG/DL — SIGNIFICANT CHANGE UP (ref 8.6–10.2)
CHLORIDE SERPL-SCNC: 100 MMOL/L — SIGNIFICANT CHANGE UP (ref 98–107)
CHOLEST SERPL-MCNC: 162 MG/DL — SIGNIFICANT CHANGE UP (ref 110–199)
CK SERPL-CCNC: 51 U/L — SIGNIFICANT CHANGE UP (ref 30–200)
CO2 SERPL-SCNC: 26 MMOL/L — SIGNIFICANT CHANGE UP (ref 22–29)
CREAT SERPL-MCNC: 0.9 MG/DL — SIGNIFICANT CHANGE UP (ref 0.5–1.3)
GLUCOSE BLDC GLUCOMTR-MCNC: 195 MG/DL — HIGH (ref 70–99)
GLUCOSE BLDC GLUCOMTR-MCNC: 239 MG/DL — HIGH (ref 70–99)
GLUCOSE BLDC GLUCOMTR-MCNC: 242 MG/DL — HIGH (ref 70–99)
GLUCOSE BLDC GLUCOMTR-MCNC: 272 MG/DL — HIGH (ref 70–99)
GLUCOSE SERPL-MCNC: 199 MG/DL — HIGH (ref 70–115)
HBA1C BLD-MCNC: 8.2 % — HIGH (ref 4–5.6)
HDLC SERPL-MCNC: 31 MG/DL — LOW
LIPID PNL WITH DIRECT LDL SERPL: 92 MG/DL — SIGNIFICANT CHANGE UP
NT-PROBNP SERPL-SCNC: 108 PG/ML — SIGNIFICANT CHANGE UP (ref 0–300)
POTASSIUM SERPL-MCNC: 3.9 MMOL/L — SIGNIFICANT CHANGE UP (ref 3.5–5.3)
POTASSIUM SERPL-SCNC: 3.9 MMOL/L — SIGNIFICANT CHANGE UP (ref 3.5–5.3)
SODIUM SERPL-SCNC: 139 MMOL/L — SIGNIFICANT CHANGE UP (ref 135–145)
TOTAL CHOLESTEROL/HDL RATIO MEASUREMENT: 5 RATIO — SIGNIFICANT CHANGE UP (ref 3.4–9.6)
TRIGL SERPL-MCNC: 193 MG/DL — SIGNIFICANT CHANGE UP (ref 10–200)
TROPONIN T SERPL-MCNC: <0.01 NG/ML — SIGNIFICANT CHANGE UP (ref 0–0.06)

## 2018-05-15 PROCEDURE — 99233 SBSQ HOSP IP/OBS HIGH 50: CPT

## 2018-05-15 RX ORDER — ALLOPURINOL 300 MG
100 TABLET ORAL
Qty: 0 | Refills: 0 | Status: DISCONTINUED | OUTPATIENT
Start: 2018-05-15 | End: 2018-05-17

## 2018-05-15 RX ORDER — OMEGA-3 ACID ETHYL ESTERS 1 G
2 CAPSULE ORAL
Qty: 0 | Refills: 0 | Status: DISCONTINUED | OUTPATIENT
Start: 2018-05-15 | End: 2018-05-16

## 2018-05-15 RX ORDER — ASPIRIN/CALCIUM CARB/MAGNESIUM 324 MG
81 TABLET ORAL DAILY
Qty: 0 | Refills: 0 | Status: DISCONTINUED | OUTPATIENT
Start: 2018-05-15 | End: 2018-05-16

## 2018-05-15 RX ORDER — VALSARTAN 80 MG/1
320 TABLET ORAL DAILY
Qty: 0 | Refills: 0 | Status: DISCONTINUED | OUTPATIENT
Start: 2018-05-15 | End: 2018-05-16

## 2018-05-15 RX ORDER — ATORVASTATIN CALCIUM 80 MG/1
40 TABLET, FILM COATED ORAL AT BEDTIME
Qty: 0 | Refills: 0 | Status: DISCONTINUED | OUTPATIENT
Start: 2018-05-15 | End: 2018-05-17

## 2018-05-15 RX ORDER — PANTOPRAZOLE SODIUM 20 MG/1
40 TABLET, DELAYED RELEASE ORAL
Qty: 0 | Refills: 0 | Status: DISCONTINUED | OUTPATIENT
Start: 2018-05-15 | End: 2018-05-17

## 2018-05-15 RX ORDER — SODIUM CHLORIDE 9 MG/ML
1000 INJECTION INTRAMUSCULAR; INTRAVENOUS; SUBCUTANEOUS
Qty: 0 | Refills: 0 | Status: DISCONTINUED | OUTPATIENT
Start: 2018-05-15 | End: 2018-05-17

## 2018-05-15 RX ORDER — METOPROLOL TARTRATE 50 MG
25 TABLET ORAL
Qty: 0 | Refills: 0 | Status: DISCONTINUED | OUTPATIENT
Start: 2018-05-15 | End: 2018-05-17

## 2018-05-15 RX ADMIN — Medication 2 GRAM(S): at 17:00

## 2018-05-15 RX ADMIN — Medication 100 MILLIGRAM(S): at 17:00

## 2018-05-15 RX ADMIN — Medication 81 MILLIGRAM(S): at 14:17

## 2018-05-15 RX ADMIN — Medication 25 MILLIGRAM(S): at 17:00

## 2018-05-15 RX ADMIN — Medication 2 GRAM(S): at 06:12

## 2018-05-15 RX ADMIN — ATORVASTATIN CALCIUM 40 MILLIGRAM(S): 80 TABLET, FILM COATED ORAL at 22:48

## 2018-05-15 RX ADMIN — PANTOPRAZOLE SODIUM 40 MILLIGRAM(S): 20 TABLET, DELAYED RELEASE ORAL at 06:12

## 2018-05-15 RX ADMIN — Medication 2: at 08:30

## 2018-05-15 RX ADMIN — Medication 2: at 12:51

## 2018-05-15 RX ADMIN — Medication 3: at 22:48

## 2018-05-15 RX ADMIN — Medication 25 MILLIGRAM(S): at 06:11

## 2018-05-15 RX ADMIN — VALSARTAN 320 MILLIGRAM(S): 80 TABLET ORAL at 06:12

## 2018-05-15 RX ADMIN — Medication 100 MILLIGRAM(S): at 06:12

## 2018-05-15 RX ADMIN — ENOXAPARIN SODIUM 40 MILLIGRAM(S): 100 INJECTION SUBCUTANEOUS at 00:12

## 2018-05-15 NOTE — PROGRESS NOTE ADULT - SUBJECTIVE AND OBJECTIVE BOX
Cardiology Nurse Practitioner Note    Pt seen and examined.  Pt denies chest pain at present.  Trop X 2 negative.  VSS. Spoke with Dr. Cleaning and cardiac cath to be scheduled for tomorrow. NPO after MN

## 2018-05-15 NOTE — PROGRESS NOTE ADULT - ASSESSMENT
78 years old male with PMH of DM, HTN, HLD, GERD and Gout comes with chest pain + abnormal EKG noted @ [facility] [place]      Chest Pain rule out ACS- STABLE> present prior to admission. ddimer neg. trop neg x1. Recently had ECHO + equivocal NST. PLAN. Continue Aspirin 81 mg. Lipitor 40 mg. Metoprolol 25 mg BID. Cardiac Enzymes x2. Lipid Profile and HbA1c. [facility] cardio consult appreciated, cardiac cath planned 5/15'    Thrombocytopenia- mild, asymptomatic. no baseline labs for comparison. no signs of complications. PLAN. cbc in AM.     DM2 not on long term insulin therapy complicated by hyperglycemia- STABLE> asymptomatic.   - on metformin 1000 bid + onglyza 5mg qd as outpt  - HbA1c  - Accu checks and RISS    HTN- MILDLY UNCONTROLLED> goal BP <140/90. asymptomatic. Continue Valsartan 320 mg. Hold HCTZ as patient is mildly dehydrated    HLD- STABLE. PLAN. Lipid Profile. Lipitor 40 mg. Omega 3 Fatty Acids     GERD- STABLE> uncomplicated, chronic. Protonix 40 mg    Gout- STABLE> uncomplicated. no active flare. Continue Allopurinol    DVT Prophylaxis -- Lovenox 40 mg 78 years old male with PMH of DM, HTN, HLD, GERD and Gout comes with chest pain + abnormal EKG [place] @ [facility] [place]      Atypical Chest Pain rule out ACS- STABLE> present prior to admission. no recurrences while inpatient. 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 ddimer neg. trop neg x1. Recently had ECHO + equivocal NST. PLAN. Continue Aspirin 81 mg. Lipitor 40 mg. Metoprolol 25 mg BID. Cardiac Enzymes x2. [facility] cardio consult appreciated, cardiac cath planned 5/15.     Thrombocytopenia- mild, asymptomatic. no baseline labs for comparison. no signs of complications. PLAN. cbc in AM.     DM2 not on long term insulin therapy complicated by hyperglycemia- MILDLY UNCONTROLLED> asymptomatic. A1c 8.2, slightly above goal 8. on metformin 1000 bid + onglyza 5mg qd as outpt. PLAN. Accu checks and RISS. resume po meds on dc.     HTN- MILDLY UNCONTROLLED> goal BP <140/90. asymptomatic. PLAN. Continue Valsartan 320 mg. Hold HCTZ as patient is mildly dehydrated.    HLD- STABLE. PLAN. Lipid Profile. Lipitor 40 mg. Omega 3 Fatty Acids     GERD- STABLE> uncomplicated, chronic. PLAN. Protonix 40 mg    Gout- STABLE> uncomplicated. no active flare. Continue Allopurinol    DVT Prophylaxis -- Lovenox 40 mg

## 2018-05-15 NOTE — CONSULT NOTE ADULT - SUBJECTIVE AND OBJECTIVE BOX
[facility]                                   [address] [place] [address]                                                      PHONE: [phone]                                                         FAX: [phone]  [email or web address]  ---------------------------------------------------------------------------------------------------------------------------------    Reason for Consult: CP    HPI:  CHIDI PALACIOS is an 78y Male referred from Urgent care after he had c/o episodes of CP resently seen at [facility] where he undergo a Myocardial perfusion scan that was equivocal with evidence of ischemia in the anteroapical wall.       PAST MEDICAL & SURGICAL HISTORY:  GERD (gastroesophageal reflux disease)  Gout  HLD (hyperlipidemia)  HTN (hypertension)  DM (diabetes mellitus)  No significant past surgical history      Allergy Status Unknown      MEDICATIONS  (STANDING):  allopurinol 100 milliGRAM(s) Oral two times a day  aspirin  chewable 81 milliGRAM(s) Oral daily  atorvastatin 40 milliGRAM(s) Oral at bedtime  enoxaparin Injectable 40 milliGRAM(s) SubCutaneous daily  insulin lispro (HumaLOG) corrective regimen sliding scale   SubCutaneous Before meals and at bedtime  metoprolol tartrate 25 milliGRAM(s) Oral two times a day  omega-3-Acid Ethyl Esters 2 Gram(s) Oral two times a day  pantoprazole    Tablet 40 milliGRAM(s) Oral before breakfast  sodium chloride 0.9%. 1000 milliLiter(s) (100 mL/Hr) IV Continuous <Continuous>  valsartan 320 milliGRAM(s) Oral daily    MEDICATIONS  (PRN):      Social History:  Cigarettes:                    Alchohol:                 Illicit Drug Abuse:      REVIEW OF SYSTEMS:    Constitutional: No fever, weight loss or fatigue  Eyes: No eye pain, visual disturbances, or discharge  ENMT:  No difficulty hearing, tinnitus, vertigo; No sinus or throat pain  Neck: No pain or stiffness  Respiratory: No cough, wheezing, chills or hemoptysis  Cardiovascular: No chest pain, palpitations, shortness of breath, dizziness or leg swelling  Gastrointestinal: No abdominal or epigastric pain. No nausea, vomiting or hematemesis; No diarrhea or constipation. No melena or hematochezia.  Genitourinary: No dysuria, frequency, hematuria or incontinence  Rectal: No pain, hemorrhoids or incontinence  Neurological: No headaches, memory loss, loss of strength, numbness or tremors  Skin: No itching, burning, rashes or lesions   Lymph Nodes: No enlarged glands  Endocrine: No heat or cold intolerance; No hair loss  Musculoskeletal: No joint pain or swelling; No muscle, back or extremity pain  Psychiatric: No depression, anxiety, mood swings or difficulty sleeping  Heme/Lymph: No easy bruising or bleeding gums  Allergy and Immunologic: No hives or eczema    Vital Signs Last 24 Hrs  T(C): 37.1 (15 May 2018 07:40), Max: 37.1 (15 May 2018 07:40)  T(F): 98.8 (15 May 2018 07:40), Max: 98.8 (15 May 2018 07:40)  HR: 72 (15 May 2018 07:40) (72 - 92)  BP: 140/84 (15 May 2018 07:40) (140/84 - 167/90)  BP(mean): --  RR: 18 (15 May 2018 07:40) (16 - 18)  SpO2: 99% (15 May 2018 07:40) (96% - 99%)  ICU Vital Signs Last 24 Hrs  [facility]Car[facility]  I&O's Detail    I&O's Summary    Drug Dosing Weight  CHIDI BREENCentra Bedford Memorial HospitalHENRIQUE      PHYSICAL EXAM:  General: Appears well developed, well nourished alert and cooperative.  HEENT: Head; normocephalic, atraumatic.  Eyes: Pupils reactive, cornea wnl.  Neck: Supple, no nodes adenopathy, no NVD or carotid bruit or thyromegaly.  CARDIOVASCULAR: Normal S1 and S2, No murmur, rub, gallop or lift.   LUNGS: No rales, rhonchi or wheeze. Normal breath sounds bilaterally.  ABDOMEN: Soft, nontender without mass or organomegaly. bowel sounds normoactive.  EXTREMITIES: No clubbing, cyanosis or edema. Distal pulses wnl.   SKIN: warm and dry with normal turgor.  NEURO: Alert/oriented x 3/normal motor exam. No pathologic reflexes.    PSYCH: normal affect.        LABS:                        14.0   7.2   )-----------( 145      ( 14 May 2018 22:17 )             40.7     05-15    139  |  100  |  19.0  ----------------------------<  199<H>  3.9   |  26.0  |  0.90    Ca    9.5      15 May 2018 07:52    TPro  7.1  /  Alb  4.2  /  TBili  0.3<L>  /  DBili  x   /  AST  15  /  ALT  22  /  AlkPhos  48  05-14    CHIDI PALACIOS  CARDIAC MARKERS ( 15 May 2018 07:52 )  x     / <0.01 ng/mL / 51 U/L / x     / x      CARDIAC MARKERS ( 14 May 2018 22:17 )  x     / <0.01 ng/mL / 64 U/L / x     / x          PT/INR - ( 14 May 2018 22:17 )   PT: 12.6 sec;   INR: 1.14 ratio         PTT - ( 14 May 2018 22:17 )  PTT:30.2 sec      RADIOLOGY & ADDITIONAL STUDIES:    INTERPRETATION OF TELEMETRY (personally reviewed):    ECG:    ECHO:    STRESS TEST:    CARDIAC CATHETERIZATION:    ACTIVE PROBLEMS:  HEALTH ISSUES - PROBLEM Dx:          Assessment and Plan:      CHIDI PALACIOS is an 78y Male referred from Urgent care after he had c/o episodes of CP resently seen at [facility] where he undergo a Myocardial perfusion scan that was equivocal with evidence of ischemia in the anteroapical wall.     Telemetry monitoring  Serial cardiac markers and EKgs  will proceed with a cardiac catherization for CAD evaluation in view of abnormal stress test and active symptomatology    TANJA ROSS MD, FACC, VALREIE

## 2018-05-15 NOTE — PROGRESS NOTE ADULT - SUBJECTIVE AND OBJECTIVE BOX
PMD: Dr Shaffer  Cardio Dr Orosco    CHIEF COMPLAINT: cp    Patient seen and examined at the bedside. No acute overnight events. - yesterday. Complaining of - this AM. Denies fever/chills, headache, lightheadedness, dizziness, chest pain, palpitations, shortness of breath, cough, abd pain, nausea/vomiting/diarrhea, muscle pain.      =========================================================================================  T(C): 36.8 (05-15-18 @ 06:05), Max: 36.8 (05-14-18 @ 19:52)  HR: 82 (05-15-18 @ 06:05) (82 - 92)  BP: 153/85 (05-15-18 @ 06:05) (150/90 - 167/90)  RR: 18 (05-15-18 @ 06:05) (16 - 18)  SpO2: 99% (05-15-18 @ 06:05) (96% - 99%)    PHYSICAL EXAM.    GEN - appears age appropriate. well nourished. pleasant. no distress.   HEENT - NCAT, EOMI, ERMELINDA  RESP - CTA BL, no wheeze/stridor/rhonchi/crackles. not on supplemental O2. able to speak in full sentences without distress.   CARDIO - NS1S2, RRR. No murmurs/rubs/gallops.  ABD - Soft/Non tender/Non distended. Normal BS x4 quadrants. no guarding/rebound tenderness.  Ext - No KANDI.  MSK - BL 5/5 strength on upper and lower extremities.   Neuro - AAOx3. cn 2-12 grossly intact  Psych - normal affect  Skin - c/d/i. no rashes/lesions      I&O's Summary    Daily Height in cm: 172.72 (14 May 2018 19:37)    Daily     =========================================================================================  LABS.    05-14    138  |  100  |  22.0<H>  ----------------------------<  213<H>  3.7   |  23.0  |  0.81    Ca    9.9      14 May 2018 22:17    TPro  7.1  /  Alb  4.2  /  TBili  0.3<L>  /  DBili  x   /  AST  15  /  ALT  22  /  AlkPhos  48  05-14                          14.0   7.2   )-----------( 145      ( 14 May 2018 22:17 )             40.7     LIVER FUNCTIONS - ( 14 May 2018 22:17 )  Alb: 4.2 g/dL / Pro: 7.1 g/dL / ALK PHOS: 48 U/L / ALT: 22 U/L / AST: 15 U/L / GGT: x           PT/INR - ( 14 May 2018 22:17 )   PT: 12.6 sec;   INR: 1.14 ratio         PTT - ( 14 May 2018 22:17 )  PTT:30.2 sec  CARDIAC MARKERS ( 14 May 2018 22:17 )  x     / <0.01 ng/mL / 64 U/L / x     / x                =========================================================================================  IMAGING.     =========================================================================================    MEDICATIONS  (STANDING):  allopurinol 100 milliGRAM(s) Oral two times a day  aspirin  chewable 81 milliGRAM(s) Oral daily  atorvastatin 40 milliGRAM(s) Oral at bedtime  enoxaparin Injectable 40 milliGRAM(s) SubCutaneous daily  insulin lispro (HumaLOG) corrective regimen sliding scale   SubCutaneous Before meals and at bedtime  metoprolol tartrate 25 milliGRAM(s) Oral two times a day  omega-3-Acid Ethyl Esters 2 Gram(s) Oral two times a day  pantoprazole    Tablet 40 milliGRAM(s) Oral before breakfast  valsartan 320 milliGRAM(s) Oral daily    MEDICATIONS  (PRN): PMD: Dr Shaffer  Cardio Dr Orosco    CHIEF COMPLAINT: cp    Patient seen and examined at the bedside. No acute overnight events. No events yesterday. Complaining of nothing this AM. Denies fever/chills, headache, lightheadedness, dizziness, chest pain, palpitations, shortness of breath, cough, abd pain, nausea/vomiting/diarrhea, muscle pain.      =========================================================================================  T(C): 36.8 (05-15-18 @ 06:05), Max: 36.8 (05-14-18 @ 19:52)  HR: 82 (05-15-18 @ 06:05) (82 - 92)  BP: 153/85 (05-15-18 @ 06:05) (150/90 - 167/90)  RR: 18 (05-15-18 @ 06:05) (16 - 18)  SpO2: 99% (05-15-18 @ 06:05) (96% - 99%)    PHYSICAL EXAM.    GEN - appears age appropriate. well nourished. pleasant. no distress.   HEENT - NCAT, EOMI, ERMELINDA. No carotid bruit.  RESP - CTA BL, no wheeze/stridor/rhonchi/crackles. not on supplemental O2. able to speak in full sentences without distress.   CARDIO - NS1S2, RRR. No murmurs/rubs/gallops.  ABD - Soft/Non tender/Non distended. Normal BS x4 quadrants. no guarding/rebound tenderness.  Ext - No KANDI.  MSK - BL 5/5 strength on upper and lower extremities.   Neuro - AAOx3. cn 2-12 grossly intact  Psych - normal affect  Skin - c/d/i. no rashes/lesions      I&O's Summary    Daily Height in cm: 172.72 (14 May 2018 19:37)    Daily     =========================================================================================  LABS.    05-14    138  |  100  |  22.0<H>  ----------------------------<  213<H>  3.7   |  23.0  |  0.81    Ca    9.9      14 May 2018 22:17    TPro  7.1  /  Alb  4.2  /  TBili  0.3<L>  /  DBili  x   /  AST  15  /  ALT  22  /  AlkPhos  48  05-14                          14.0   7.2   )-----------( 145      ( 14 May 2018 22:17 )             40.7     LIVER FUNCTIONS - ( 14 May 2018 22:17 )  Alb: 4.2 g/dL / Pro: 7.1 g/dL / ALK PHOS: 48 U/L / ALT: 22 U/L / AST: 15 U/L / GGT: x           PT/INR - ( 14 May 2018 22:17 )   PT: 12.6 sec;   INR: 1.14 ratio         PTT - ( 14 May 2018 22:17 )  PTT:30.2 sec  CARDIAC MARKERS ( 14 May 2018 22:17 )  x     / <0.01 ng/mL / 64 U/L / x     / x                =========================================================================================  IMAGING.     =========================================================================================    MEDICATIONS  (STANDING):  allopurinol 100 milliGRAM(s) Oral two times a day  aspirin  chewable 81 milliGRAM(s) Oral daily  atorvastatin 40 milliGRAM(s) Oral at bedtime  enoxaparin Injectable 40 milliGRAM(s) SubCutaneous daily  insulin lispro (HumaLOG) corrective regimen sliding scale   SubCutaneous Before meals and at bedtime  metoprolol tartrate 25 milliGRAM(s) Oral two times a day  omega-3-Acid Ethyl Esters 2 Gram(s) Oral two times a day  pantoprazole    Tablet 40 milliGRAM(s) Oral before breakfast  valsartan 320 milliGRAM(s) Oral daily    MEDICATIONS  (PRN):

## 2018-05-16 ENCOUNTER — TRANSCRIPTION ENCOUNTER (OUTPATIENT)
Age: 78
End: 2018-05-16

## 2018-05-16 DIAGNOSIS — I25.118 ATHEROSCLEROTIC HEART DISEASE OF NATIVE CORONARY ARTERY WITH OTHER FORMS OF ANGINA PECTORIS: ICD-10-CM

## 2018-05-16 LAB
ALBUMIN SERPL ELPH-MCNC: 4.1 G/DL — SIGNIFICANT CHANGE UP (ref 3.3–5.2)
ALP SERPL-CCNC: 50 U/L — SIGNIFICANT CHANGE UP (ref 40–120)
ALT FLD-CCNC: 18 U/L — SIGNIFICANT CHANGE UP
ANION GAP SERPL CALC-SCNC: 12 MMOL/L — SIGNIFICANT CHANGE UP (ref 5–17)
ANION GAP SERPL CALC-SCNC: 13 MMOL/L — SIGNIFICANT CHANGE UP (ref 5–17)
APPEARANCE UR: CLEAR — SIGNIFICANT CHANGE UP
APTT BLD: 31 SEC — SIGNIFICANT CHANGE UP (ref 27.5–37.4)
AST SERPL-CCNC: 13 U/L — SIGNIFICANT CHANGE UP
BASOPHILS # BLD AUTO: 0 K/UL — SIGNIFICANT CHANGE UP (ref 0–0.2)
BASOPHILS NFR BLD AUTO: 0.1 % — SIGNIFICANT CHANGE UP (ref 0–2)
BILIRUB SERPL-MCNC: 0.5 MG/DL — SIGNIFICANT CHANGE UP (ref 0.4–2)
BILIRUB UR-MCNC: NEGATIVE — SIGNIFICANT CHANGE UP
BUN SERPL-MCNC: 15 MG/DL — SIGNIFICANT CHANGE UP (ref 8–20)
BUN SERPL-MCNC: 17 MG/DL — SIGNIFICANT CHANGE UP (ref 8–20)
CALCIUM SERPL-MCNC: 8.7 MG/DL — SIGNIFICANT CHANGE UP (ref 8.6–10.2)
CALCIUM SERPL-MCNC: 9.5 MG/DL — SIGNIFICANT CHANGE UP (ref 8.6–10.2)
CHLORIDE SERPL-SCNC: 102 MMOL/L — SIGNIFICANT CHANGE UP (ref 98–107)
CHLORIDE SERPL-SCNC: 99 MMOL/L — SIGNIFICANT CHANGE UP (ref 98–107)
CO2 SERPL-SCNC: 24 MMOL/L — SIGNIFICANT CHANGE UP (ref 22–29)
CO2 SERPL-SCNC: 27 MMOL/L — SIGNIFICANT CHANGE UP (ref 22–29)
COLOR SPEC: YELLOW — SIGNIFICANT CHANGE UP
CREAT SERPL-MCNC: 0.7 MG/DL — SIGNIFICANT CHANGE UP (ref 0.5–1.3)
CREAT SERPL-MCNC: 0.85 MG/DL — SIGNIFICANT CHANGE UP (ref 0.5–1.3)
DIFF PNL FLD: NEGATIVE — SIGNIFICANT CHANGE UP
EOSINOPHIL # BLD AUTO: 0.3 K/UL — SIGNIFICANT CHANGE UP (ref 0–0.5)
EOSINOPHIL NFR BLD AUTO: 4.1 % — SIGNIFICANT CHANGE UP (ref 0–5)
EPI CELLS # UR: SIGNIFICANT CHANGE UP
GLUCOSE BLDC GLUCOMTR-MCNC: 207 MG/DL — HIGH (ref 70–99)
GLUCOSE BLDC GLUCOMTR-MCNC: 209 MG/DL — HIGH (ref 70–99)
GLUCOSE BLDC GLUCOMTR-MCNC: 249 MG/DL — HIGH (ref 70–99)
GLUCOSE BLDC GLUCOMTR-MCNC: 254 MG/DL — HIGH (ref 70–99)
GLUCOSE BLDC GLUCOMTR-MCNC: 255 MG/DL — HIGH (ref 70–99)
GLUCOSE BLDC GLUCOMTR-MCNC: 275 MG/DL — HIGH (ref 70–99)
GLUCOSE SERPL-MCNC: 219 MG/DL — HIGH (ref 70–115)
GLUCOSE SERPL-MCNC: 228 MG/DL — HIGH (ref 70–115)
GLUCOSE UR QL: 250 MG/DL
HBA1C BLD-MCNC: 8.3 % — HIGH (ref 4–5.6)
HCT VFR BLD CALC: 38.3 % — LOW (ref 42–52)
HCT VFR BLD CALC: 42 % — SIGNIFICANT CHANGE UP (ref 42–52)
HGB BLD-MCNC: 12.9 G/DL — LOW (ref 14–18)
HGB BLD-MCNC: 14.5 G/DL — SIGNIFICANT CHANGE UP (ref 14–18)
INR BLD: 1.28 RATIO — HIGH (ref 0.88–1.16)
KETONES UR-MCNC: NEGATIVE — SIGNIFICANT CHANGE UP
LEUKOCYTE ESTERASE UR-ACNC: NEGATIVE — SIGNIFICANT CHANGE UP
LYMPHOCYTES # BLD AUTO: 1.4 K/UL — SIGNIFICANT CHANGE UP (ref 1–4.8)
LYMPHOCYTES # BLD AUTO: 18.1 % — LOW (ref 20–55)
MCHC RBC-ENTMCNC: 28.9 PG — SIGNIFICANT CHANGE UP (ref 27–31)
MCHC RBC-ENTMCNC: 29.5 PG — SIGNIFICANT CHANGE UP (ref 27–31)
MCHC RBC-ENTMCNC: 33.7 G/DL — SIGNIFICANT CHANGE UP (ref 32–36)
MCHC RBC-ENTMCNC: 34.5 G/DL — SIGNIFICANT CHANGE UP (ref 32–36)
MCV RBC AUTO: 85.4 FL — SIGNIFICANT CHANGE UP (ref 80–94)
MCV RBC AUTO: 85.9 FL — SIGNIFICANT CHANGE UP (ref 80–94)
MONOCYTES # BLD AUTO: 0.8 K/UL — SIGNIFICANT CHANGE UP (ref 0–0.8)
MONOCYTES NFR BLD AUTO: 10.2 % — HIGH (ref 3–10)
MRSA PCR RESULT.: SIGNIFICANT CHANGE UP
NEUTROPHILS # BLD AUTO: 5.2 K/UL — SIGNIFICANT CHANGE UP (ref 1.8–8)
NEUTROPHILS NFR BLD AUTO: 67.1 % — SIGNIFICANT CHANGE UP (ref 37–73)
NITRITE UR-MCNC: POSITIVE
NT-PROBNP SERPL-SCNC: 357 PG/ML — HIGH (ref 0–300)
PH UR: 6 — SIGNIFICANT CHANGE UP (ref 5–8)
PLATELET # BLD AUTO: 147 K/UL — LOW (ref 150–400)
PLATELET # BLD AUTO: 172 K/UL — SIGNIFICANT CHANGE UP (ref 150–400)
POTASSIUM SERPL-MCNC: 3.7 MMOL/L — SIGNIFICANT CHANGE UP (ref 3.5–5.3)
POTASSIUM SERPL-MCNC: 3.7 MMOL/L — SIGNIFICANT CHANGE UP (ref 3.5–5.3)
POTASSIUM SERPL-SCNC: 3.7 MMOL/L — SIGNIFICANT CHANGE UP (ref 3.5–5.3)
POTASSIUM SERPL-SCNC: 3.7 MMOL/L — SIGNIFICANT CHANGE UP (ref 3.5–5.3)
PREALB SERPL-MCNC: 20 MG/DL — SIGNIFICANT CHANGE UP (ref 18–38)
PROT SERPL-MCNC: 7.2 G/DL — SIGNIFICANT CHANGE UP (ref 6.6–8.7)
PROT UR-MCNC: 30 MG/DL
PROTHROM AB SERPL-ACNC: 14.2 SEC — HIGH (ref 9.8–12.7)
RBC # BLD: 4.46 M/UL — LOW (ref 4.6–6.2)
RBC # BLD: 4.92 M/UL — SIGNIFICANT CHANGE UP (ref 4.6–6.2)
RBC # FLD: 14 % — SIGNIFICANT CHANGE UP (ref 11–15.6)
RBC # FLD: 14.1 % — SIGNIFICANT CHANGE UP (ref 11–15.6)
RBC CASTS # UR COMP ASSIST: SIGNIFICANT CHANGE UP /HPF (ref 0–4)
S AUREUS DNA NOSE QL NAA+PROBE: DETECTED
SODIUM SERPL-SCNC: 138 MMOL/L — SIGNIFICANT CHANGE UP (ref 135–145)
SODIUM SERPL-SCNC: 139 MMOL/L — SIGNIFICANT CHANGE UP (ref 135–145)
SP GR SPEC: 1.01 — SIGNIFICANT CHANGE UP (ref 1.01–1.02)
UROBILINOGEN FLD QL: 1 MG/DL
WBC # BLD: 5.8 K/UL — SIGNIFICANT CHANGE UP (ref 4.8–10.8)
WBC # BLD: 7.8 K/UL — SIGNIFICANT CHANGE UP (ref 4.8–10.8)
WBC # FLD AUTO: 5.8 K/UL — SIGNIFICANT CHANGE UP (ref 4.8–10.8)
WBC # FLD AUTO: 7.8 K/UL — SIGNIFICANT CHANGE UP (ref 4.8–10.8)
WBC UR QL: SIGNIFICANT CHANGE UP

## 2018-05-16 PROCEDURE — 93306 TTE W/DOPPLER COMPLETE: CPT | Mod: 26

## 2018-05-16 PROCEDURE — 99222 1ST HOSP IP/OBS MODERATE 55: CPT | Mod: 57

## 2018-05-16 PROCEDURE — 99233 SBSQ HOSP IP/OBS HIGH 50: CPT

## 2018-05-16 PROCEDURE — 93880 EXTRACRANIAL BILAT STUDY: CPT | Mod: 26

## 2018-05-16 RX ORDER — MUPIROCIN 20 MG/G
1 OINTMENT TOPICAL
Qty: 0 | Refills: 0 | Status: DISCONTINUED | OUTPATIENT
Start: 2018-05-16 | End: 2018-05-17

## 2018-05-16 RX ORDER — OMEGA-3 ACID ETHYL ESTERS 1 G
0 CAPSULE ORAL
Qty: 0 | Refills: 0 | COMMUNITY

## 2018-05-16 RX ORDER — CEFUROXIME AXETIL 250 MG
1500 TABLET ORAL ONCE
Qty: 0 | Refills: 0 | Status: DISCONTINUED | OUTPATIENT
Start: 2018-05-17 | End: 2018-05-17

## 2018-05-16 RX ORDER — OMEGA-3 ACID ETHYL ESTERS 1 G
2 CAPSULE ORAL
Qty: 0 | Refills: 0 | COMMUNITY
Start: 2018-05-16

## 2018-05-16 RX ORDER — VANCOMYCIN HCL 1 G
1000 VIAL (EA) INTRAVENOUS ONCE
Qty: 0 | Refills: 0 | Status: DISCONTINUED | OUTPATIENT
Start: 2018-05-17 | End: 2018-05-17

## 2018-05-16 RX ORDER — METOPROLOL TARTRATE 50 MG
1 TABLET ORAL
Qty: 60 | Refills: 0 | OUTPATIENT
Start: 2018-05-16 | End: 2018-06-14

## 2018-05-16 RX ORDER — INSULIN LISPRO 100/ML
VIAL (ML) SUBCUTANEOUS
Qty: 0 | Refills: 0 | Status: DISCONTINUED | OUTPATIENT
Start: 2018-05-16 | End: 2018-05-17

## 2018-05-16 RX ORDER — CHLORHEXIDINE GLUCONATE 213 G/1000ML
1 SOLUTION TOPICAL
Qty: 0 | Refills: 0 | Status: DISCONTINUED | OUTPATIENT
Start: 2018-05-16 | End: 2018-05-17

## 2018-05-16 RX ORDER — ATORVASTATIN CALCIUM 80 MG/1
1 TABLET, FILM COATED ORAL
Qty: 30 | Refills: 0 | OUTPATIENT
Start: 2018-05-16 | End: 2018-06-14

## 2018-05-16 RX ORDER — CHLORHEXIDINE GLUCONATE 213 G/1000ML
15 SOLUTION TOPICAL
Qty: 0 | Refills: 0 | Status: DISCONTINUED | OUTPATIENT
Start: 2018-05-16 | End: 2018-05-17

## 2018-05-16 RX ORDER — SODIUM CHLORIDE 9 MG/ML
3 INJECTION INTRAMUSCULAR; INTRAVENOUS; SUBCUTANEOUS EVERY 8 HOURS
Qty: 0 | Refills: 0 | Status: DISCONTINUED | OUTPATIENT
Start: 2018-05-16 | End: 2018-05-17

## 2018-05-16 RX ORDER — OMEPRAZOLE 10 MG/1
20 CAPSULE, DELAYED RELEASE ORAL
Qty: 0 | Refills: 0 | COMMUNITY

## 2018-05-16 RX ADMIN — Medication 25 MILLIGRAM(S): at 19:10

## 2018-05-16 RX ADMIN — Medication 100 MILLIGRAM(S): at 05:22

## 2018-05-16 RX ADMIN — CHLORHEXIDINE GLUCONATE 15 MILLILITER(S): 213 SOLUTION TOPICAL at 23:25

## 2018-05-16 RX ADMIN — CHLORHEXIDINE GLUCONATE 1 APPLICATION(S): 213 SOLUTION TOPICAL at 23:25

## 2018-05-16 RX ADMIN — SODIUM CHLORIDE 3 MILLILITER(S): 9 INJECTION INTRAMUSCULAR; INTRAVENOUS; SUBCUTANEOUS at 22:09

## 2018-05-16 RX ADMIN — Medication 3: at 12:18

## 2018-05-16 RX ADMIN — Medication 2: at 09:32

## 2018-05-16 RX ADMIN — MUPIROCIN 1 APPLICATION(S): 20 OINTMENT TOPICAL at 23:25

## 2018-05-16 RX ADMIN — ATORVASTATIN CALCIUM 40 MILLIGRAM(S): 80 TABLET, FILM COATED ORAL at 23:25

## 2018-05-16 RX ADMIN — VALSARTAN 320 MILLIGRAM(S): 80 TABLET ORAL at 05:21

## 2018-05-16 RX ADMIN — Medication 81 MILLIGRAM(S): at 13:54

## 2018-05-16 RX ADMIN — Medication 25 MILLIGRAM(S): at 05:22

## 2018-05-16 RX ADMIN — Medication 6: at 23:24

## 2018-05-16 RX ADMIN — ENOXAPARIN SODIUM 40 MILLIGRAM(S): 100 INJECTION SUBCUTANEOUS at 12:18

## 2018-05-16 RX ADMIN — SODIUM CHLORIDE 100 MILLILITER(S): 9 INJECTION INTRAMUSCULAR; INTRAVENOUS; SUBCUTANEOUS at 05:21

## 2018-05-16 RX ADMIN — Medication 2: at 19:14

## 2018-05-16 RX ADMIN — PANTOPRAZOLE SODIUM 40 MILLIGRAM(S): 20 TABLET, DELAYED RELEASE ORAL at 05:21

## 2018-05-16 RX ADMIN — Medication 2 GRAM(S): at 05:21

## 2018-05-16 RX ADMIN — Medication 100 MILLIGRAM(S): at 19:10

## 2018-05-16 NOTE — DISCHARGE NOTE ADULT - ADDITIONAL INSTRUCTIONS
-Follow up with Primary Care Doctor within 1 week  -Follow up with Cardiology (heart doctor) in 3-4 weeks 1. Take ALL of your medications as ordered. Fill your prescriptions the day you are discharged and take according to the schedule you were given. Continue to take a stool softener if you are taking narcotic pain medications. AVOID medications such as ibuprofen or naproxen if you have had bypass surgery. If you have any questions or are unable to fill the prescriptions, please call the office right away at [phone].  2. Shower daily. Clean all incisions daily while showering with warm water and mild soap, pat dry with a clean towel and do not cover with any dressings unless instructed to. No bathing, swimming in a pool or the ocean until instructed by MD.  DO NOT use creams or lotions on the wound.  3. We advise that you do not drive until instructed by MD. Sit in the rear passenger seat with the red pillow between chest and seatbelt to avoid injury in the event of a motor vehicle accident until you see the surgeon again in the office.  4. You may not return to work until instructed by MD.   5. Please eat a low fat, low cholesterol, low salt diet. (No added/extra salt). A nutritional supplement like Ensure or Glucerna (for diabetics) may help you reach your nutritional goals after surgery and aid in your recovery.  6. Weigh yourself every day in the morning and record it in the weight log in your red folder. Notify the office of any weight gain more than 2-3 pounds in 24 hours.  **Please LIMIT your fluid intake to less than a liter a day.**  7. Continue breathing exercises several times a day. Continue to use your heart pillow when coughing.  8. No heavy lifting nothing greater than 5 pounds until cleared by MD. We recommend that you sleep on your back and not your side or stomach for the next 4-6 weeks.  9. Call / Notify MD any fever greater than 101.0, any drainage from incisions or if they become red, hot or very tender to the touch.  10. Increase activity as tolerated. Walk indoors and/or outdoors at least 3 times a day.

## 2018-05-16 NOTE — PROGRESS NOTE ADULT - ASSESSMENT
79 yo male admitted with episode of chest pain and hx of recent equivocal nuke stress test. Risk factors of age/dm/hypertension and family hx. For cardiac cath this afternoon-discussed with pt.

## 2018-05-16 NOTE — DISCHARGE NOTE ADULT - MEDICATION SUMMARY - MEDICATIONS TO TAKE
I will START or STAY ON the medications listed below when I get home from the hospital:    DME Glucometer  -- Indication: For DM (diabetes mellitus)    DME Lancets  -- Please provide patient with lancets in accordance with insurance approved glucometer. Patient to check glucose 4 x daily (before meals and before bedtime)  -- Indication: For DM (diabetes mellitus)    DME Test Strips  -- Please provide patient with test strips in accordance with insurance approved glucometer. Patient to test glucose 4 x daily (qac, qhs)  -- Indication: For DM (diabetes mellitus)    DME nanoneedles  -- For use with Lantus and Humalog pens  To receive insulin injections 4 x daily  -- Indication: For DM (diabetes mellitus)    Percocet 5/325 oral tablet  -- 1 tab(s) by mouth every 4 hours, As Needed -Moderate Pain (4 - 6) MDD:6  -- Indication: For Pain    aspirin 325 mg oral delayed release tablet  -- 1 tab(s) by mouth once a day  -- Indication: For Coronary artery disease of native artery of native heart with stable angina pectoris    Pacerone 200 mg oral tablet  -- 1 tab(s) by mouth once a day   -- Indication: For Atrial Fibrillation    Lantus Solostar Pen 100 units/mL subcutaneous solution  -- 35 unit(s) subcutaneous once a day (at bedtime)   -- Indication: For DM (diabetes mellitus)    HumaLOG KwikPen (Concentrated) 200 units/mL subcutaneous solution  -- 12 unit(s) subcutaneous 3 times a day (before meals)   -- Indication: For DM (diabetes mellitus)    metFORMIN 1000 mg oral tablet  -- 1 tab(s) by mouth 2 times a day   -- Check with your doctor before becoming pregnant.  Do not drink alcoholic beverages when taking this medication.  It is very important that you take or use this exactly as directed.  Do not skip doses or discontinue unless directed by your doctor.  Obtain medical advice before taking any non-prescription drugs as some may affect the action of this medication.  Take with food or milk.    -- Indication: For DM (diabetes mellitus)    allopurinol 100 mg oral tablet  -- 1 tab(s) by mouth 2 times a day  -- Indication: For Gout    atorvastatin 40 mg oral tablet  -- 1 tab(s) by mouth once a day (at bedtime)  -- Indication: For Hyperlipidemia, unspecified hyperlipidemia type    atenolol 25 mg oral tablet  -- 1 tab(s) by mouth 2 times a day  -- Indication: For Atrial fibrillation    furosemide 40 mg oral tablet  -- 1 tab(s) by mouth once a day  -- Indication: For CABG    docusate sodium 100 mg oral capsule  -- 1 cap(s) by mouth 3 times a day  -- Indication: For Constipation    senna oral tablet  -- 2 tab(s) by mouth once a day (at bedtime)  -- Indication: For Constipation    potassium chloride 20 mEq oral powder for reconstitution  -- 1 packet(s) by mouth once a day  -- Indication: For Potassium with diuretic (lasix)    omega-3 polyunsaturated fatty acids ethyl esters 1000 mg oral capsule  -- 2 cap(s) by mouth 2 times a day  -- Indication: For Dc    omeprazole  -- 20 milligram(s) by mouth once a day  -- Indication: For Acid reflux I will START or STAY ON the medications listed below when I get home from the hospital:    DME Glucometer  -- Indication: For DM (diabetes mellitus)    DME Lancets  -- Please provide patient with lancets in accordance with insurance approved glucometer. Patient to check glucose 4 x daily (before meals and before bedtime)  -- Indication: For DM (diabetes mellitus)    DME Test Strips  -- Please provide patient with test strips in accordance with insurance approved glucometer. Patient to test glucose 4 x daily (qac, qhs)  -- Indication: For DM (diabetes mellitus)    DME nanoneedles  -- For use with Lantus and Humalog pens  To receive insulin injections 4 x daily  -- Indication: For DM (diabetes mellitus)    Percocet 5/325 oral tablet  -- 1 tab(s) by mouth every 4 hours, As Needed -Moderate Pain (4 - 6) MDD:6  -- Indication: For Pain    aspirin 325 mg oral delayed release tablet  -- 1 tab(s) by mouth once a day  -- Indication: For Coronary artery disease of native artery of native heart with stable angina pectoris    Pacerone 200 mg oral tablet  -- 1 tab(s) by mouth once a day   -- Indication: For Atrial Fibrillation    Lantus Solostar Pen 100 units/mL subcutaneous solution  -- 35 unit(s) subcutaneous once a day (at bedtime)   -- Indication: For DM (diabetes mellitus)    metFORMIN 1000 mg oral tablet  -- 1 tab(s) by mouth 2 times a day   -- Check with your doctor before becoming pregnant.  Do not drink alcoholic beverages when taking this medication.  It is very important that you take or use this exactly as directed.  Do not skip doses or discontinue unless directed by your doctor.  Obtain medical advice before taking any non-prescription drugs as some may affect the action of this medication.  Take with food or milk.    -- Indication: For DM (diabetes mellitus)    NovoLOG FlexPen 100 units/mL injectable solution  -- 8 unit(s) injectable 3 times a day (before meals)   -- Check with your doctor before becoming pregnant.  Do not drink alcoholic beverages when taking this medication.  Keep in refrigerator.  Do not freeze.  Obtain medical advice before taking any non-prescription drugs as some may affect the action of this medication.    -- Indication: For DM (diabetes mellitus)    allopurinol 100 mg oral tablet  -- 1 tab(s) by mouth 2 times a day  -- Indication: For Gout    atorvastatin 40 mg oral tablet  -- 1 tab(s) by mouth once a day (at bedtime)  -- Indication: For Hyperlipidemia, unspecified hyperlipidemia type    atenolol 25 mg oral tablet  -- 1 tab(s) by mouth 2 times a day  -- Indication: For Atrial fibrillation    furosemide 40 mg oral tablet  -- 1 tab(s) by mouth once a day  -- Indication: For CABG    docusate sodium 100 mg oral capsule  -- 1 cap(s) by mouth 3 times a day  -- Indication: For Constipation    senna oral tablet  -- 2 tab(s) by mouth once a day (at bedtime)  -- Indication: For Constipation    potassium chloride 20 mEq oral powder for reconstitution  -- 1 packet(s) by mouth once a day  -- Indication: For Potassium with diuretic (lasix)    omeprazole  -- 20 milligram(s) by mouth once a day  -- Indication: For Acid reflux

## 2018-05-16 NOTE — DISCHARGE NOTE ADULT - CARE PLAN
Principal Discharge DX:	Atypical chest pain  Goal:	resolution  Assessment and plan of treatment:	You were noted to have chest pain either on arrival or during your hospitalization. Tests to evaluate your heart including blood tests called troponins and EKGs were performed and fortunately you do NOT have signs of heart attack based on these studies. If you have however been instructed to follow up with a Cardiologist for further work up or continued management, it is extremely important that you do so in order to lower your risk of having a heart attack in the future. If you have been prescribed medications for heart health, it is very important that you ALWAYS take them and do not stop doing so unless instructed by a healthcare provider.  Secondary Diagnosis:	Thrombocytopenia  Goal:	follow up  Assessment and plan of treatment:	You were noticed to have blood work with some abnormalities. At this time, you are safe to leave the hospital, we do not suspect that anything immediately will come of these findings, but it is something that should be followed to see if it is getting better, staying the same, or getting worse. Be sure to discuss this at your follow up visit with your Primary Care Doctor to have these tests repeated and to see what work up, if any, is necessary to continue managing it.  Secondary Diagnosis:	Type 2 diabetes mellitus with hyperglycemia, without long-term current use of insulin  Goal:	A1c <8, yours is 8.2  Assessment and plan of treatment:	Follow a low carb low sugar diet. Continue to take all antidiabetic medications/insulin as prescribed. Follow up with your Primary Care Doctor regularly for blood sugar/A1c checks. Be sure to see an eye doctor and foot doctor on an annual basis.  Secondary Diagnosis:	Essential hypertension  Goal:	blood pressure <140/90  Assessment and plan of treatment:	Be sure to follow a low salt diet. If you have been prescribed antihypertensive medications to control your blood pressure, be sure to take them every day as prescribed and do not miss any doses, the medications do not work if they are not taken consistently. Follow up with your Primary Care Doctor and have your Blood Pressure checked.  Secondary Diagnosis:	Hyperlipidemia, unspecified hyperlipidemia type  Goal:	control  Assessment and plan of treatment:	Follow a low fat diet. Continue taking your cholesterol medications as prescribed. Follow up with your Primary Care Doctor to continue having your cholesterol levels checked on a continual basis.  Secondary Diagnosis:	Gastroesophageal reflux disease, esophagitis presence not specified  Goal:	control  Assessment and plan of treatment:	Be sure to take precautions to limit/avoid symptoms. This includes eating small, frequent meals instead of 3 large meals per day. This also includes limiting consumption of: spicy foods, alcohol, fatty foods, chocolate, coffee, peppermint, tomatoes/tomato products. Avoid laying down for at least 3 hours after a meal. Also, you may benefit from using medications to control the acid in your stomach called proton pump inhibitors or H2 blockers, some examples of these that can be purchased over the counter include Pantoprazole, Omeprazole, Ranitidine.  Secondary Diagnosis:	Chronic gout without tophus, unspecified cause, unspecified site  Goal:	prevention  Assessment and plan of treatment:	If you have been prescribed medication to control your gout, be sure to take it as prescribed. Avoid foods that are known to trigger gout flares such as: red meat/organ meat, shellfish, refined carbohydrates (ex. white bread, white rice, pasta, sugar), processed foods (ex. chips, snack foods, frozen dinners), sugary beverages, alcohol (no more that 1-2 drinks in a 24hr period). Principal Discharge DX:	S/P CABG x 3  Goal:	recover from surgery  Assessment and plan of treatment:	Please follow up with Dr. Burrows on  Please come to ED or Call Cardio thoracic office at [phone] if Chest pain, Shortness of Breath, persistant Nausea & vomiting, oozing from wounds,palpitations or pain not relieved with medication  Weigh yourself daily>notify surgeons office if  2 lb increase in weight in 24 hours.  Wash incisions with soap and water using washcloth in shower daily  Shower daily, no bathing swimming in a pool or the ocean until instructed by MD.    We advise that you do not drive until instructed by MD.   You may not return to work until instructed by MD.   DO NOT APPLY CREAM POWDER LOTION OR OINTMENT TO ANY SURGICAL WOUND>THIS CAN IMPEDE HEALING AND CAUSE AN INFECTION    Please eat a low fat low salt diet. Principal Discharge DX:	S/P CABG x 3  Goal:	recover from surgery  Assessment and plan of treatment:	Please follow up with Dr. Burrows on June 12 at 1230 pm  Please come to ED or Call Cardio thoracic office at [phone] if Chest pain, Shortness of Breath, persistant Nausea & vomiting, oozing from wounds,palpitations or pain not relieved with medication  Weigh yourself daily>notify surgeons office if  2 lb increase in weight in 24 hours.  Wash incisions with soap and water using washcloth in shower daily  Shower daily, no bathing swimming in a pool or the ocean until instructed by MD.    We advise that you do not drive until instructed by MD.   You may not return to work until instructed by MD.   DO NOT APPLY CREAM POWDER LOTION OR OINTMENT TO ANY SURGICAL WOUND>THIS CAN IMPEDE HEALING AND CAUSE AN INFECTION    Please eat a low fat low salt diet. Principal Discharge DX:	Coronary artery disease of native artery of native heart with stable angina pectoris  Goal:	recover from surgery  Assessment and plan of treatment:	Please follow up with Dr. Burrows on June 12 at 1230 pm  Please follow up with your Cardiologist and Primary Care Physician 2-4 weeks from discharge.    The cardiac surgery office is on the second floor of [facility].   Take the [place] ("G") elevators to 2 and make a left.   Walk down to the second hallway on your left (before the double doors).   Sign says Cardiovascular and Thoracic Surgery. Turn left and walk down the long hallway.  The waiting room is through the double doors toward the end of the hallway.    Please call the Cardiothoracic Surgery office at [phone] if you are experiencing any shortness of breath, chest pain, fevers or chills, drainage from the incisions, persistent nausea, vomiting or if you have any questions about your medications. If the symptoms are severe, call 911 and go to the nearest hospital. You can also call [phone] for an emergency [facility] ambulance, which will take you to the closest [facility].    If you need any assistance for making any appointments for a new consult or referral in any specialty, please call our [facility] Coordination [facility] at [phone].  Secondary Diagnosis:	Type 2 diabetes mellitus without complication, without long-term current use of insulin  Goal:	Sugar Control  Assessment and plan of treatment:	It is extremely important to follow a diabetic (consistent carbohydrates, LOW/NO ADDED SUGAR) diet and monitor your glucose (sugar) levels. You have an increased risk of complications, including wound infections, due to the diabetes.    1. Check your glucoses 3-4 times daily before meals and bedtime.   2. Keep a log of your glucose levels every day.   3. Make an appointment to meet with your primary care provider or endocrinologist within a week.   4. Take ALL of your medications as prescribed. Only hold medications for low glucose levels (< 80) or if you are symptomatic (dizzy, sweating, lightheaded).  5. Ideally, we would like all of the glucose levels to be between .     You may have been discharged on different medications than you were taking before. Your body reacts differently to the medications after surgery. Please continue to take the medications as prescribed and notify the office, nurse practitioner or your PCP if you have any concerns right away.  Secondary Diagnosis:	Essential hypertension  Goal:	Blood pressure control  Assessment and plan of treatment:	Please take all medications as prescribed.

## 2018-05-16 NOTE — PROGRESS NOTE ADULT - ASSESSMENT
78 years old male with PMH of DM, HTN, HLD, GERD and Gout comes with chest pain + abnormal EKG noted @ [facility] [place]      Atypical Chest Pain rule out ACS- STABLE> present prior to admission. no recurrences while inpatient. 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 ddimer neg. trop neg x2. Recently had ECHO + equivocal NST. PLAN. Continue Aspirin 81 mg. Lipitor 40 mg. Metoprolol 25 mg BID. [facility] cardio consult appreciated, cardiac cath planned 5/16 as was unable to be performed day prior. if unremarkable will dc home afterwards.      Thrombocytopenia- STABLE> mild, asymptomatic. no baseline labs for comparison. no signs of complications. PLAN. outpt follow up    DM2 not on long term insulin therapy complicated by hyperglycemia- MILDLY UNCONTROLLED> asymptomatic. A1c 8.2, slightly above goal 8. on metformin 1000 bid + onglyza 5mg qd as outpt. PLAN. Accu checks and RISS. resume po meds on dc. if CAD noted on cath, should be started on Jardiance for cv benefit     HTN- MILDLY UNCONTROLLED> goal BP <140/90. asymptomatic. PLAN. Continue Valsartan 320 mg. Hold HCTZ as patient is mildly dehydrated. PLAN. resume HCTZ on dc, expect that it will bring BP back to goal.     HLD- STABLE. PLAN. Lipid Profile. Lipitor 40 mg. Omega 3 Fatty Acids     GERD- STABLE> uncomplicated, chronic. PLAN. Protonix 40 mg    Gout- STABLE> uncomplicated. no active flare. Continue Allopurinol    DVT Prophylaxis -- Lovenox 40 mg 78 years old male with PMH of DM, HTN, HLD, GERD and Gout comes with chest pain + abnormal EKG noted @ [facility] [place]      Atypical Chest Pain rule out ACS- STABLE> present prior to admission. no recurrences while inpatient. 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 ddimer neg. trop neg x2. Recently had ECHO + equivocal NST. PLAN. Continue Aspirin 81 mg. Lipitor 40 mg. Metoprolol 25 mg BID. [facility] cardio consult appreciated, cardiac cath planned 5/16 as was unable to be performed day prior. if unremarkable will dc home afterwards.      Thrombocytopenia- STABLE> mild, asymptomatic. no baseline labs for comparison. no signs of complications. PLAN. outpt follow up    DM2 not on long term insulin therapy complicated by hyperglycemia- MILDLY UNCONTROLLED> asymptomatic. A1c 8.2, slightly above goal 8. on metformin 1000 bid + onglyza 5mg qd as outpt. PLAN. Accu checks and RISS. resume po meds on dc. if CAD noted on cath, should be started on Jardiance for cv benefit     HTN- MILDLY UNCONTROLLED> goal BP <140/90. asymptomatic. PLAN. Continue Valsartan 320 mg. Hold HCTZ as patient is mildly dehydrated. PLAN. resume HCTZ on dc, expect that it will bring BP back to goal.     HLD- STABLE> PLAN. changed pravastatin to lipitor 40mg, ASCVD score ~59%, warrants higher intensity tx give comorbid conditions. Omega 3 Fatty Acids     GERD- STABLE> uncomplicated, chronic. PLAN. Protonix 40 mg    Gout- STABLE> uncomplicated. no active flare. Continue Allopurinol    DVT Prophylaxis -- Lovenox 40 mg

## 2018-05-16 NOTE — DISCHARGE NOTE ADULT - OTHER SIGNIFICANT FINDINGS
05-16    138  |  102  |  15.0  ----------------------------<  219<H>  3.7   |  24.0  |  0.70    Ca    8.7      16 May 2018 08:04    TPro  7.1  /  Alb  4.2  /  TBili  0.3<L>  /  DBili  x   /  AST  15  /  ALT  22  /  AlkPhos  48  05-14                          12.9   5.8   )-----------( 147      ( 16 May 2018 08:04 )             38.3     LIVER FUNCTIONS - ( 14 May 2018 22:17 )  Alb: 4.2 g/dL / Pro: 7.1 g/dL / ALK PHOS: 48 U/L / ALT: 22 U/L / AST: 15 U/L / GGT: x           PT/INR - ( 14 May 2018 22:17 )   PT: 12.6 sec;   INR: 1.14 ratio      PTT - ( 14 May 2018 22:17 )  PTT:30.2 sec     Hemoglobin A1C, Whole Blood: 8.2 % (05.15.18 @ 07:52)    Lipid Profile in AM (05.15.18 @ 07:52)    Total Cholesterol/HDL Ratio Measurement: 5.0 Ratio    Cholesterol, Serum: 162 mg/dL    Triglycerides, Serum: 193 mg/dL    HDL Cholesterol, Serum: 31 mg/dL    Direct LDL: 92: LDL     < from: Xray Chest 1 View AP/PA. (05.14.18 @ 22:38) >  FINDINGS /   IMPRESSION:     There is no acute consolidation.  There are no pleural effusion or   pneumothorax. Cardiac and mediastinal structures are within normal   limits.    There are degenerative changes.     < end of copied text >    < from: 12 Lead ECG (05.14.18 @ 19:47) >  Ventricular Rate 92 BPM    Atrial Rate 92 BPM    P-R Interval 166 ms    QRS Duration 98 ms    Q-T Interval 364 ms    QTC Calculation(Bezet) 450 ms    P Axis 49 degrees    R Axis 13 degrees    T Axis 36 degrees    Diagnosis Line Normal sinus rhythm  Normal ECG    < end of copied text > Patient assessment, examination, discharge planning, coordination of care, patient and family education and counseling took me 45 minutes to complete.

## 2018-05-16 NOTE — PROGRESS NOTE ADULT - PROBLEM SELECTOR PLAN 1
Preop workup including labs, PFT's, Carotid US and TTE.  Admit to CT surgery service on 4CTU.  Plan to follow.  Will discuss with Dr. Burrows.

## 2018-05-16 NOTE — DISCHARGE NOTE ADULT - HOSPITAL COURSE
78 years old male with PMH of DM, HTN, HLD, GERD and Gout comes with chest pain + abnormal EKG noted @ [facility] [place]. Atypical Chest Pain rule out ACS- STABLE> present prior to admission. no recurrences while inpatient. 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 ddimer neg. trop neg x2. Recently had ECHO + equivocal NST. Continue Aspirin 81 mg. Lipitor 40 mg. Metoprolol 25 mg BID. [facility] cardio consult appreciated, cardiac cath planned 5/16 as was unable to be performed day prior.      Course complicated by incidental Thrombocytopenia- STABLE> mild, asymptomatic. no baseline labs for comparison. no signs of complications. PLAN. outpt follow up    Regarding DM2 not on long term insulin therapy complicated by hyperglycemia- MILDLY UNCONTROLLED> asymptomatic. A1c 8.2, slightly above goal 8. on metformin 1000 bid + onglyza 5mg qd as outpt. Accu checks and RISS while inpatient. resume po meds on dc. if CAD noted on cath, should be started on Jardiance for cv benefit     Regarding HTN- MILDLY UNCONTROLLED> goal BP <140/90. asymptomatic. PLAN. Continue Valsartan 320 mg. Hold HCTZ as patient is mildly dehydrated. resume HCTZ on dc, expect that it will bring BP back to goal.     Regarding HLD- STABLE> PLAN. changed pravastatin to lipitor 40mg, ASCVD score ~59%, warrants higher intensity tx give comorbid conditions. Omega 3 Fatty Acids.     All electrolyte abnormalities were monitored carefully and repleted as necessary during this hospitalization. At the time of discharge patient was hemodynamically stable and amenable to all terms of discharge. The patient has received verbal instructions from myself regarding discharge plans.     Length of Discharge: 45MIN 79 y/o M with PMHx of DM, HTN, HLD, GERD and gout presented to ED with 2xdays c/o intermittent chest pain radiating to his back. Pt underwent 2 recent stress test with a subsequent positive nuclear stress test. In th ED pt had neg. 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       5/17 s/p C3L (Lima-LAD, SVG-PDA/OM). Immediately post op pt received 1 unit of PRBCs for hematocrit of 24. Brief moment of a-fib with aberrancy with conversion to NSR, amio iv 150 given and PO load started.  Pt extubated without issue.  5/18 Amiodarone d/c'd, Lopressor started, hyperglycemic Endo consulted- Lantus 30u qhs and Humalog 10u added.  5/19 Rapid Afib 120-150> IV cardizem andIV lopressor given, and PO Amio load started.  Conversion to SR overnight.   5/20 Asymptomatic hypotension in AM.  Albumin x 1 given.  Atenolol decreased.  HCT 27.  PRBC x 1.

## 2018-05-16 NOTE — DISCHARGE NOTE ADULT - PROVIDER TOKENS
TOKEN:'[id number]',TOKEN:'[id number]' TOKEN:'[id number]:'[id number]' TOKEN:'[id number]',TOKEN:'[id number]'

## 2018-05-16 NOTE — DISCHARGE NOTE ADULT - COMMUNITY RESOURCES
[facility] Nurse Practitioner  [phone]  They will call you to arrange complimentary home visits post operatively GALLO GALO NP  (FOLLOW YOUR HEART PROGRAM)  [phone]  They will call you to arrange complimentary home visits post operatively

## 2018-05-16 NOTE — PROGRESS NOTE ADULT - SUBJECTIVE AND OBJECTIVE BOX
Cardiology NP note:    -79 yo male admitted with episode of chest pain and hx of recent equivocal nuke stress test. Risk factors of age/dm/hypertension and family hx. For cardiac cath this afternoon.    ASA 3  Mallampati 2  GFR > 60

## 2018-05-16 NOTE — CONSULT NOTE ADULT - SUBJECTIVE AND OBJECTIVE BOX
Surgeon: Markos    Consult requesting by: Zaina    HISTORY OF PRESENT ILLNESS:  78y Male with PMH HLD and DM (On oral agents). He reports intermittent left shoulder pain radiating to the back over the last few months.  He had two recent stress tests at his cardiologists office that were equivocal (the 2nd was last week).  Presented to the ER with Chest pain this AM and was taken to the cardiac cath lab.  Cardiac cath showed triple vessel CAD. Currently lying in stretcher.  Wife at bedside.  Denies CP or SOB.  NAD noted.     PAST MEDICAL & SURGICAL HISTORY:  GERD (gastroesophageal reflux disease)  Gout  HLD (hyperlipidemia)  HTN (hypertension)  DM (diabetes mellitus)  No significant past surgical history      MEDICATIONS  (STANDING):  allopurinol 100 milliGRAM(s) Oral two times a day  aspirin  chewable 81 milliGRAM(s) Oral daily  atorvastatin 40 milliGRAM(s) Oral at bedtime  enoxaparin Injectable 40 milliGRAM(s) SubCutaneous daily  insulin lispro (HumaLOG) corrective regimen sliding scale   SubCutaneous Before meals and at bedtime  metoprolol tartrate 25 milliGRAM(s) Oral two times a day  omega-3-Acid Ethyl Esters 2 Gram(s) Oral two times a day  pantoprazole    Tablet 40 milliGRAM(s) Oral before breakfast  sodium chloride 0.9%. 1000 milliLiter(s) (100 mL/Hr) IV Continuous <Continuous>  valsartan 320 milliGRAM(s) Oral daily    MEDICATIONS  (PRN):    Antiplatelet therapy:   aspirin 81mg last today.                       Allergies    No Known Allergies    Intolerances        SOCIAL HISTORY:  Smoker: [ x] Yes  [ ] No  quit 40 years ago.  Was a 4ppd smoker x 10 years.        ETOH use: [ ] Yes  [ x] No              FREQUENCY / QUANTITY:  Ilicit Drug use:  [ ] Yes  [x ] No  Occupation: Retired Dental lab worker  Live with: Wife  Assisted device use: Denies.    FAMILY HISTORY:  Family history of heart disease (Father, Sibling)      Review of Systems  CONSTITUTIONAL:  Fevers[ ] chills[ ] sweats[ ] fatigue[ ] weight loss[ ] weight gain [ ]                                     NEGATIVE [ x]   NEURO:  parathesias[ ] seizures [ ]  syncope [ ]  confusion [ ]                                                                                NEGATIVE[x ]   EYES: glasses[ ]  blurry vision[ ]  discharge[ ] pain[ ] glaucoma [ ]                                                                          NEGATIVE[x ]   ENMT:  difficulty hearing [ ]  vertigo[ ]  dysphagia[ ] epistaxis[ ] recent dental work [ ]                                    NEGATIVE[x ]   CV:  chest pain[x ] palpitations[ ] PAK [ ] diaphoresis [ ]                                                                                           NEGATIVE[ ]   RESPIRATORY:  wheezing[ ] SOB[ ] cough [ ] sputum[ ] hemoptysis[ ]                                                                  NEGATIVE[x ]   GI:  nausea[ ]  vomiting [ ]  diarrhea[ ] constipation [ ] melena [ ]                                                                      NEGATIVE[x ]   : hematuria[ ]  dysuria[ ] urgency[ ] incontinence[ ]                                                                                            NEGATIVE[x ]   MUSKULOSKELETAL:  arthritis[ ]  joint swelling [ ] muscle weakness [ ]                                                                NEGATIVE[x ]   SKIN/BREAST:  rash[ ] itching [ ]  hair loss[ ] masses[ ]                                                                                              NEGATIVE[ x]   PSYCH:  dementia [ ] depresion [ ] anxiety[ ]                                                                                                               NEGATIVE[x ]   HEME/LYMPH:  bruises easily[ ] enlarged lymph nodes[ ] tender lymph nodes[ ]                                               NEGATIVE[x ]   ENDOCRINE:  cold intolerance[ ] heat intolerance[ ] polydipsia[ ]                                                                          NEGATIVE[x ]     PHYSICAL EXAM  Vital Signs Last 24 Hrs  T(C): 36.8 (16 May 2018 11:04), Max: 37.2 (16 May 2018 04:06)  T(F): 98.3 (16 May 2018 11:04), Max: 98.9 (16 May 2018 04:06)  HR: 75 (16 May 2018 18:03) (71 - 79)  BP: 157/84 (16 May 2018 18:03) (126/74 - 174/87)  RR: 23 (16 May 2018 18:03) (11 - 23)  SpO2: 100% (16 May 2018 18:03) (97% - 100%) on room air at rest.    CONSTITUTIONAL:                                                                          WNL[x ]   Neuro: WNL[x ] Normal exam oriented to person/place & time with no focal motor or sensory  deficits. Other                     Eyes: WNL[ x]   Normal exam of conjunctiva & lids, pupils equally reactive. Other     ENT: WNL[x ]    Normal exam of nasal/oral mucosa with absence of cyanosis. Other  Neck: WNL[x ]  Normal exam of jugular veins, trachea & thyroid. Other  Chest: WNL[x ] Normal lung exam with good air movement absence of wheezes, rales, or rhonchi: Other                                                                                CV:  Auscultation: normal [x ] S3[ ] S4[ ] Irregular [ ] Rub[ ] Clicks[ ]    Murmurs none:[x ]systolic [ ]  diastolic [ ] holosystolic [ ]  Carotids: No Bruits[x ] Other                 Abdominal Aorta: normal [x ] nonpalpable[ ]Other                                                                                      GI:           WNL[x ] Normal exam of abdomen, liver & spleen with no noted masses or tenderness. Other                                                                                                        Extremities: WNL[x ] Normal no evidence of cyanosis or deformity Edema: none[ ]trace[ ]1+[ ]2+[ ]3+[ ]4+[ ]  Lower Extremity Pulses: Right[pp ] Left[pp ]Varicosities[ -]  SKIN :WNL[x ] Normal exam to inspection & palation. Other:                                                          LABS:                        12.9   5.8   )-----------( 147      ( 16 May 2018 08:04 )             38.3     05-16    138  |  102  |  15.0  ----------------------------<  219<H>  3.7   |  24.0  |  0.70    Ca    8.7      16 May 2018 08:04    TPro  7.1  /  Alb  4.2  /  TBili  0.3<L>  /  DBili  x   /  AST  15  /  ALT  22  /  AlkPhos  48  05-14    PT/INR - ( 14 May 2018 22:17 )   PT: 12.6 sec;   INR: 1.14 ratio         PTT - ( 14 May 2018 22:17 )  PTT:30.2 sec    CARDIAC MARKERS ( 15 May 2018 07:52 )  x     / <0.01 ng/mL / 51 U/L / x     / x      CARDIAC MARKERS ( 14 May 2018 22:17 )  x     / <0.01 ng/mL / 64 U/L / x     / x              Cardiac Cath: < from: Cardiac Cath Lab - Adult (05.16.18 @ 16:10) >  VENTRICLES: EF 55% w/ no sig MR.  CORONARY VESSELS: R dominant.  Moderate sized RCA w/ proximal occlusion.  Large LCX w/ 70% OM1 and 95% distal stenosis.  Large LAD w/ 95% mid vessel segmental stenosis.  COMPLICATIONS: There were no complications.  DIAGNOSTIC IMPRESSIONS: Triple vessel CAD.  Preserved EF.  Patent LIMA.  DM.  DIAGNOSTIC RECOMMENDATIONS: CABG.  INTERVENTIONAL IMPRESSIONS: Triple vessel CAD.  Preserved EF.  Patent LIMA.  DM.  INTERVENTIONAL RECOMMENDATIONS: CABG.  Prepared and signed by  Star Mahajan MD  Signed 05/16/2018 16:42:06    < end of copied text >      TTE / TABITHA: none Surgeon: Markos    Consult requesting by: Zaina    HISTORY OF PRESENT ILLNESS:  78y Male with PMH HLD and DM (On oral agents). He reports intermittent left shoulder pain radiating to the back over the last few months.  He had two recent stress tests at his cardiologists office that were equivocal (the 2nd was last week).  Presented to the ER 5/15 with Chest pain.  His troponins were negative.  He was taken to the cardiac cath lab today.  Cardiac cath showed triple vessel CAD. Currently lying in stretcher.  Wife at bedside.  Denies CP or SOB.  NAD noted.     PAST MEDICAL & SURGICAL HISTORY:  GERD (gastroesophageal reflux disease)  Gout  HLD (hyperlipidemia)  HTN (hypertension)  DM (diabetes mellitus)  No significant past surgical history      MEDICATIONS  (STANDING):  allopurinol 100 milliGRAM(s) Oral two times a day  aspirin  chewable 81 milliGRAM(s) Oral daily  atorvastatin 40 milliGRAM(s) Oral at bedtime  enoxaparin Injectable 40 milliGRAM(s) SubCutaneous daily  insulin lispro (HumaLOG) corrective regimen sliding scale   SubCutaneous Before meals and at bedtime  metoprolol tartrate 25 milliGRAM(s) Oral two times a day  omega-3-Acid Ethyl Esters 2 Gram(s) Oral two times a day  pantoprazole    Tablet 40 milliGRAM(s) Oral before breakfast  sodium chloride 0.9%. 1000 milliLiter(s) (100 mL/Hr) IV Continuous <Continuous>  valsartan 320 milliGRAM(s) Oral daily    MEDICATIONS  (PRN):    Antiplatelet therapy:   aspirin 81mg last today.                       Allergies    No Known Allergies    Intolerances        SOCIAL HISTORY:  Smoker: [ x] Yes  [ ] No  quit 40 years ago.  Was a 4ppd smoker x 10 years.        ETOH use: [ ] Yes  [ x] No              FREQUENCY / QUANTITY:  Ilicit Drug use:  [ ] Yes  [x ] No  Occupation: Retired Dental lab worker  Live with: Wife  Assisted device use: Denies.    FAMILY HISTORY:  Family history of heart disease (Father, Sibling)      Review of Systems  CONSTITUTIONAL:  Fevers[ ] chills[ ] sweats[ ] fatigue[ ] weight loss[ ] weight gain [ ]                                     NEGATIVE [ x]   NEURO:  parathesias[ ] seizures [ ]  syncope [ ]  confusion [ ]                                                                                NEGATIVE[x ]   EYES: glasses[ ]  blurry vision[ ]  discharge[ ] pain[ ] glaucoma [ ]                                                                          NEGATIVE[x ]   ENMT:  difficulty hearing [ ]  vertigo[ ]  dysphagia[ ] epistaxis[ ] recent dental work [ ]                                    NEGATIVE[x ]   CV:  chest pain[x ] palpitations[ ] PAK [ ] diaphoresis [ ]                                                                                           NEGATIVE[ ]   RESPIRATORY:  wheezing[ ] SOB[ ] cough [ ] sputum[ ] hemoptysis[ ]                                                                  NEGATIVE[x ]   GI:  nausea[ ]  vomiting [ ]  diarrhea[ ] constipation [ ] melena [ ]                                                                      NEGATIVE[x ]   : hematuria[ ]  dysuria[ ] urgency[ ] incontinence[ ]                                                                                            NEGATIVE[x ]   MUSKULOSKELETAL:  arthritis[ ]  joint swelling [ ] muscle weakness [ ]                                                                NEGATIVE[x ]   SKIN/BREAST:  rash[ ] itching [ ]  hair loss[ ] masses[ ]                                                                                              NEGATIVE[ x]   PSYCH:  dementia [ ] depresion [ ] anxiety[ ]                                                                                                               NEGATIVE[x ]   HEME/LYMPH:  bruises easily[ ] enlarged lymph nodes[ ] tender lymph nodes[ ]                                               NEGATIVE[x ]   ENDOCRINE:  cold intolerance[ ] heat intolerance[ ] polydipsia[ ]                                                                          NEGATIVE[x ]     PHYSICAL EXAM  Vital Signs Last 24 Hrs  T(C): 36.8 (16 May 2018 11:04), Max: 37.2 (16 May 2018 04:06)  T(F): 98.3 (16 May 2018 11:04), Max: 98.9 (16 May 2018 04:06)  HR: 75 (16 May 2018 18:03) (71 - 79)  BP: 157/84 (16 May 2018 18:03) (126/74 - 174/87)  RR: 23 (16 May 2018 18:03) (11 - 23)  SpO2: 100% (16 May 2018 18:03) (97% - 100%) on room air at rest.    CONSTITUTIONAL:                                                                          WNL[x ]   Neuro: WNL[x ] Normal exam oriented to person/place & time with no focal motor or sensory  deficits. Other                     Eyes: WNL[ x]   Normal exam of conjunctiva & lids, pupils equally reactive. Other     ENT: WNL[x ]    Normal exam of nasal/oral mucosa with absence of cyanosis. Other  Neck: WNL[x ]  Normal exam of jugular veins, trachea & thyroid. Other  Chest: WNL[x ] Normal lung exam with good air movement absence of wheezes, rales, or rhonchi: Other                                                                                CV:  Auscultation: normal [x ] S3[ ] S4[ ] Irregular [ ] Rub[ ] Clicks[ ]    Murmurs none:[x ]systolic [ ]  diastolic [ ] holosystolic [ ]  Carotids: No Bruits[x ] Other                 Abdominal Aorta: normal [x ] nonpalpable[ ]Other                                                                                      GI:           WNL[x ] Normal exam of abdomen, liver & spleen with no noted masses or tenderness. Other                                                                                                        Extremities: WNL[x ] Normal no evidence of cyanosis or deformity Edema: none[ ]trace[ ]1+[ ]2+[ ]3+[ ]4+[ ]  Lower Extremity Pulses: Right[pp ] Left[pp ]Varicosities[ -]  SKIN :WNL[x ] Normal exam to inspection & palation. Other:                                                          LABS:                        12.9   5.8   )-----------( 147      ( 16 May 2018 08:04 )             38.3     05-16    138  |  102  |  15.0  ----------------------------<  219<H>  3.7   |  24.0  |  0.70    Ca    8.7      16 May 2018 08:04    TPro  7.1  /  Alb  4.2  /  TBili  0.3<L>  /  DBili  x   /  AST  15  /  ALT  22  /  AlkPhos  48  05-14    PT/INR - ( 14 May 2018 22:17 )   PT: 12.6 sec;   INR: 1.14 ratio         PTT - ( 14 May 2018 22:17 )  PTT:30.2 sec    CARDIAC MARKERS ( 15 May 2018 07:52 )  x     / <0.01 ng/mL / 51 U/L / x     / x      CARDIAC MARKERS ( 14 May 2018 22:17 )  x     / <0.01 ng/mL / 64 U/L / x     / x              Cardiac Cath: < from: Cardiac Cath Lab - Adult (05.16.18 @ 16:10) >  VENTRICLES: EF 55% w/ no sig MR.  CORONARY VESSELS: R dominant.  Moderate sized RCA w/ proximal occlusion.  Large LCX w/ 70% OM1 and 95% distal stenosis.  Large LAD w/ 95% mid vessel segmental stenosis.  COMPLICATIONS: There were no complications.  DIAGNOSTIC IMPRESSIONS: Triple vessel CAD.  Preserved EF.  Patent LIMA.  DM.  DIAGNOSTIC RECOMMENDATIONS: CABG.  INTERVENTIONAL IMPRESSIONS: Triple vessel CAD.  Preserved EF.  Patent LIMA.  DM.  INTERVENTIONAL RECOMMENDATIONS: CABG.  Prepared and signed by  Star Mahajan MD  Signed 05/16/2018 16:42:06    < end of copied text >      TTE / TABITHA: none

## 2018-05-16 NOTE — PROGRESS NOTE ADULT - SUBJECTIVE AND OBJECTIVE BOX
PMD: Dr Shaffer  Cardio Dr Orosco    CHIEF COMPLAINT: cp    Patient seen and examined at the bedside. No acute overnight events. No events yesterday. Complaining of nothing this AM. NPO for cath today. Denies fever/chills, headache, lightheadedness, dizziness, chest pain, palpitations, shortness of breath, cough, abd pain, nausea/vomiting/diarrhea, muscle pain.      =========================================================================================    PHYSICAL EXAM.    GEN - appears age appropriate. well nourished. pleasant. no distress.   HEENT - NCAT, EOMI, ERMELINDA. No carotid bruit.  RESP - CTA BL, no wheeze/stridor/rhonchi/crackles. not on supplemental O2. able to speak in full sentences without distress.   CARDIO - NS1S2, RRR. No murmurs/rubs/gallops.  ABD - Soft/Non tender/Non distended. Normal BS x4 quadrants. no guarding/rebound tenderness.  Ext - No KANDI.  MSK - BL 5/5 strength on upper and lower extremities.   Neuro - AAOx3. cn 2-12 grossly intact  Psych - normal affect  Skin - c/d/i. no rashes/lesions        VITAL SIGNS.    Vital Signs Last 24 Hrs  T(C): 36.8 (16 May 2018 11:04), Max: 37.2 (16 May 2018 04:06)  T(F): 98.3 (16 May 2018 11:04), Max: 98.9 (16 May 2018 04:06)  HR: 79 (16 May 2018 14:21) (73 - 87)  BP: 174/87 (16 May 2018 14:21) (126/74 - 174/87)  BP(mean): --  RR: 15 (16 May 2018 14:21) (15 - 25)  SpO2: 98% (16 May 2018 14:21) (98% - 99%)        =================================================    LABS.                          12.9   5.8   )-----------( 147      ( 16 May 2018 08:04 )             38.3     05-16    138  |  102  |  15.0  ----------------------------<  219<H>  3.7   |  24.0  |  0.70    Ca    8.7      16 May 2018 08:04    TPro  7.1  /  Alb  4.2  /  TBili  0.3<L>  /  DBili  x   /  AST  15  /  ALT  22  /  AlkPhos  48  05-14    LIVER FUNCTIONS - ( 14 May 2018 22:17 )  Alb: 4.2 g/dL / Pro: 7.1 g/dL / ALK PHOS: 48 U/L / ALT: 22 U/L / AST: 15 U/L / GGT: x           PT/INR - ( 14 May 2018 22:17 )   PT: 12.6 sec;   INR: 1.14 ratio         PTT - ( 14 May 2018 22:17 )  PTT:30.2 sec  I&O's Summary    15 May 2018 07:01  -  16 May 2018 07:00  --------------------------------------------------------  IN: 100 mL / OUT: 0 mL / NET: 100 mL    16 May 2018 07:01  -  16 May 2018 14:52  --------------------------------------------------------  IN: 600 mL / OUT: 0 mL / NET: 600 mL          ================================================    IMAGING.      ================================================    MEDICATIONS  (STANDING):  allopurinol 100 milliGRAM(s) Oral two times a day  aspirin  chewable 81 milliGRAM(s) Oral daily  atorvastatin 40 milliGRAM(s) Oral at bedtime  enoxaparin Injectable 40 milliGRAM(s) SubCutaneous daily  insulin lispro (HumaLOG) corrective regimen sliding scale   SubCutaneous Before meals and at bedtime  metoprolol tartrate 25 milliGRAM(s) Oral two times a day  omega-3-Acid Ethyl Esters 2 Gram(s) Oral two times a day  pantoprazole    Tablet 40 milliGRAM(s) Oral before breakfast  sodium chloride 0.9%. 1000 milliLiter(s) (100 mL/Hr) IV Continuous <Continuous>  valsartan 320 milliGRAM(s) Oral daily    MEDICATIONS  (PRN):

## 2018-05-16 NOTE — DISCHARGE NOTE ADULT - CARE PROVIDERS DIRECT ADDRESSES
,[email or web address],DirectAddress_Unknown,[email or web address],DirectAddress_Unknown

## 2018-05-16 NOTE — DISCHARGE NOTE ADULT - CARE PROVIDER_API CALL
Dima Shaffer), Internal Medicine  [address] [place] [address]  [place] [address]  Phone: [phone]  Fax: [phone]    Chucky Orosco), Cardiovascular Disease; Internal Medicine  [address] [place] [address]  Phone: [phone]  Fax: [phone] Dima Shaffer), Internal Medicine  [address] [facility]  [place] [address]  Phone: [phone]  Fax: [phone]    Chucky Orosco), Cardiovascular Disease; Internal Medicine  [address] [place] [address]  Phone: [phone]  Fax: [phone]    Jacek Burrows), Surgery; Thoracic [place] [address]  Phone: [phone]  Fax: [phone] Dima Shaffer), Internal Medicine  [address] [facility]  [place] [address]  Phone: [phone]  Fax: [phone]    Chucky Orosco), Cardiovascular Disease; Internal Medicine  [address] [place] [address]  Phone: [phone]  Fax: [phone]    Jacek Burrows), Surgery; Thoracic [place] [address]  Phone: [phone]  Fax: [phone]    Chucky Hall), EndocrinologyMetabDiabetes; Internal Medicine  [facility] [place] [address]  Phone: [phone]  Fax: [phone]

## 2018-05-16 NOTE — DISCHARGE NOTE ADULT - SECONDARY DIAGNOSIS.
Thrombocytopenia Type 2 diabetes mellitus with hyperglycemia, without long-term current use of insulin Essential hypertension Hyperlipidemia, unspecified hyperlipidemia type Gastroesophageal reflux disease, esophagitis presence not specified Chronic gout without tophus, unspecified cause, unspecified site Type 2 diabetes mellitus without complication, without long-term current use of insulin

## 2018-05-16 NOTE — PROGRESS NOTE ADULT - SUBJECTIVE AND OBJECTIVE BOX
Cardiology NP note:     -s/p LHC: 3VD; patient LIMA; EF 55%  -RFA mynx site benign without hematoma/bleeding; no bruit; + right PP  -VSS    -A/P: 79 yo male admitted with episode of chest pain and hx of recent equivocal NST. Risk factors of age/dm/hypertension and family hx. Now s/p LHC revealing significant CAD.   -CT surgery consult with Dr. Burrows  -Maintain bedrest until 7 pm then OOB as tolerated  -Maintain statin/beta blocker/baby ASA  -Additional plan as per Attending MD

## 2018-05-16 NOTE — DISCHARGE NOTE ADULT - PRINCIPAL DIAGNOSIS
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

## 2018-05-16 NOTE — PROGRESS NOTE ADULT - SUBJECTIVE AND OBJECTIVE BOX
Surgeon: Markos    Consult requesting by: Zaina    HISTORY OF PRESENT ILLNESS:  78y Male with PMH HLD and DM (On oral agents). He reports intermittent left shoulder pain radiating to the back over the last few months.  He had two recent stress tests at his cardiologists office that were equivocal (the 2nd was last week).  Presented to the ER with Chest pain this AM and was taken to the cardiac cath lab.  Cardiac cath showed triple vessel CAD. Currently lying in stretcher.  Wife at bedside.  Denies CP or SOB.  NAD noted.     PAST MEDICAL & SURGICAL HISTORY:  GERD (gastroesophageal reflux disease)  Gout  HLD (hyperlipidemia)  HTN (hypertension)  DM (diabetes mellitus)  No significant past surgical history      MEDICATIONS  (STANDING):  allopurinol 100 milliGRAM(s) Oral two times a day  aspirin  chewable 81 milliGRAM(s) Oral daily  atorvastatin 40 milliGRAM(s) Oral at bedtime  enoxaparin Injectable 40 milliGRAM(s) SubCutaneous daily  insulin lispro (HumaLOG) corrective regimen sliding scale   SubCutaneous Before meals and at bedtime  metoprolol tartrate 25 milliGRAM(s) Oral two times a day  omega-3-Acid Ethyl Esters 2 Gram(s) Oral two times a day  pantoprazole    Tablet 40 milliGRAM(s) Oral before breakfast  sodium chloride 0.9%. 1000 milliLiter(s) (100 mL/Hr) IV Continuous <Continuous>  valsartan 320 milliGRAM(s) Oral daily    MEDICATIONS  (PRN):    Antiplatelet therapy:   aspirin 81mg last today.                       Allergies    No Known Allergies    Intolerances        SOCIAL HISTORY:  Smoker: [ x] Yes  [ ] No  quit 40 years ago.  Was a 4ppd smoker x 10 years.        ETOH use: [ ] Yes  [ x] No              FREQUENCY / QUANTITY:  Ilicit Drug use:  [ ] Yes  [x ] No  Occupation: Retired Dental lab worker  Live with: Wife  Assisted device use: Denies.    FAMILY HISTORY:  Family history of heart disease (Father, Sibling)      Review of Systems  CONSTITUTIONAL:  Fevers[ ] chills[ ] sweats[ ] fatigue[ ] weight loss[ ] weight gain [ ]                                     NEGATIVE [ x]   NEURO:  parathesias[ ] seizures [ ]  syncope [ ]  confusion [ ]                                                                                NEGATIVE[x ]   EYES: glasses[ ]  blurry vision[ ]  discharge[ ] pain[ ] glaucoma [ ]                                                                          NEGATIVE[x ]   ENMT:  difficulty hearing [ ]  vertigo[ ]  dysphagia[ ] epistaxis[ ] recent dental work [ ]                                    NEGATIVE[x ]   CV:  chest pain[x ] palpitations[ ] PAK [ ] diaphoresis [ ]                                                                                           NEGATIVE[ ]   RESPIRATORY:  wheezing[ ] SOB[ ] cough [ ] sputum[ ] hemoptysis[ ]                                                                  NEGATIVE[x ]   GI:  nausea[ ]  vomiting [ ]  diarrhea[ ] constipation [ ] melena [ ]                                                                      NEGATIVE[x ]   : hematuria[ ]  dysuria[ ] urgency[ ] incontinence[ ]                                                                                            NEGATIVE[x ]   MUSKULOSKELETAL:  arthritis[ ]  joint swelling [ ] muscle weakness [ ]                                                                NEGATIVE[x ]   SKIN/BREAST:  rash[ ] itching [ ]  hair loss[ ] masses[ ]                                                                                              NEGATIVE[ x]   PSYCH:  dementia [ ] depresion [ ] anxiety[ ]                                                                                                               NEGATIVE[x ]   HEME/LYMPH:  bruises easily[ ] enlarged lymph nodes[ ] tender lymph nodes[ ]                                               NEGATIVE[x ]   ENDOCRINE:  cold intolerance[ ] heat intolerance[ ] polydipsia[ ]                                                                          NEGATIVE[x ]     PHYSICAL EXAM  Vital Signs Last 24 Hrs  T(C): 36.8 (16 May 2018 11:04), Max: 37.2 (16 May 2018 04:06)  T(F): 98.3 (16 May 2018 11:04), Max: 98.9 (16 May 2018 04:06)  HR: 75 (16 May 2018 18:03) (71 - 79)  BP: 157/84 (16 May 2018 18:03) (126/74 - 174/87)  RR: 23 (16 May 2018 18:03) (11 - 23)  SpO2: 100% (16 May 2018 18:03) (97% - 100%) on room air at rest.    CONSTITUTIONAL:                                                                          WNL[x ]   Neuro: WNL[x ] Normal exam oriented to person/place & time with no focal motor or sensory  deficits. Other                     Eyes: WNL[ x]   Normal exam of conjunctiva & lids, pupils equally reactive. Other     ENT: WNL[x ]    Normal exam of nasal/oral mucosa with absence of cyanosis. Other  Neck: WNL[x ]  Normal exam of jugular veins, trachea & thyroid. Other  Chest: WNL[x ] Normal lung exam with good air movement absence of wheezes, rales, or rhonchi: Other                                                                                CV:  Auscultation: normal [x ] S3[ ] S4[ ] Irregular [ ] Rub[ ] Clicks[ ]    Murmurs none:[x ]systolic [ ]  diastolic [ ] holosystolic [ ]  Carotids: No Bruits[x ] Other                 Abdominal Aorta: normal [x ] nonpalpable[ ]Other                                                                                      GI:           WNL[x ] Normal exam of abdomen, liver & spleen with no noted masses or tenderness. Other                                                                                                        Extremities: WNL[x ] Normal no evidence of cyanosis or deformity Edema: none[ ]trace[ ]1+[ ]2+[ ]3+[ ]4+[ ]  Lower Extremity Pulses: Right[pp ] Left[pp ]Varicosities[ -]  SKIN :WNL[x ] Normal exam to inspection & palation. Other:                                                          LABS:                        12.9   5.8   )-----------( 147      ( 16 May 2018 08:04 )             38.3     05-16    138  |  102  |  15.0  ----------------------------<  219<H>  3.7   |  24.0  |  0.70    Ca    8.7      16 May 2018 08:04    TPro  7.1  /  Alb  4.2  /  TBili  0.3<L>  /  DBili  x   /  AST  15  /  ALT  22  /  AlkPhos  48  05-14    PT/INR - ( 14 May 2018 22:17 )   PT: 12.6 sec;   INR: 1.14 ratio         PTT - ( 14 May 2018 22:17 )  PTT:30.2 sec    CARDIAC MARKERS ( 15 May 2018 07:52 )  x     / <0.01 ng/mL / 51 U/L / x     / x      CARDIAC MARKERS ( 14 May 2018 22:17 )  x     / <0.01 ng/mL / 64 U/L / x     / x              Cardiac Cath: < from: Cardiac Cath Lab - Adult (05.16.18 @ 16:10) >  VENTRICLES: EF 55% w/ no sig MR.  CORONARY VESSELS: R dominant.  Moderate sized RCA w/ proximal occlusion.  Large LCX w/ 70% OM1 and 95% distal stenosis.  Large LAD w/ 95% mid vessel segmental stenosis.  COMPLICATIONS: There were no complications.  DIAGNOSTIC IMPRESSIONS: Triple vessel CAD.  Preserved EF.  Patent LIMA.  DM.  DIAGNOSTIC RECOMMENDATIONS: CABG.  INTERVENTIONAL IMPRESSIONS: Triple vessel CAD.  Preserved EF.  Patent LIMA.  DM.  INTERVENTIONAL RECOMMENDATIONS: CABG.  Prepared and signed by  Star Mahajan MD  Signed 05/16/2018 16:42:06    < end of copied text >      TTE / TABITHA: none

## 2018-05-16 NOTE — CONSULT NOTE ADULT - PROBLEM SELECTOR RECOMMENDATION 9
Preop workup in progress  Plan for CABG tomorrow AM.  Admit to CT surgery service.  Discussed with Dr. Burrows.

## 2018-05-16 NOTE — DISCHARGE NOTE ADULT - INSTRUCTIONS
low salt low fat low sugar low carb A registered dietician can help you create a personalized plan for healthy eating. Make your calories count by choosin. Healthy carbohydrates such as fruits, vegetables, whole grains, legumes (beans, peas and lentils) and low-fat dairy products.  2. Fiber-rich foods such as vegetables, fruits, nuts, legumes, whole-wheat flour and wheat bran.  3. Heart-healthy fish at least twice a week such as cod, tuna and halibut, which are low-fat options as well as salmon, mackerel, tuna, sardines and bluefish, which are rich in omega-3 fatty acids. Avoid fish with high levels of mercury.  4. "Good" fats such as avocados, almonds, pecans, walnuts, olives and canola, olive and peanut oils.     Minimize foods with high saturated fats (beef, hot dogs, sausage and jordan), trans fats (processed snacks, baked goods, shortening and stick margarines), high cholesterol foods (high fat dairy products and animal proteins, fried food) and high sodium foods (fast food, many frozen foods, processed food).

## 2018-05-16 NOTE — DISCHARGE NOTE ADULT - NS AS ACTIVITY OBS
as tolerated Walking-Indoors allowed/Do not make important decisions/Showering allowed/Stairs allowed/No Heavy lifting/straining/Do not drive or operate machinery/Walking-Outdoors allowed

## 2018-05-16 NOTE — PROGRESS NOTE ADULT - SUBJECTIVE AND OBJECTIVE BOX
Chief Complaint: chart and hx reviewed.    HPI: 79 yo male admitted with chest pain-hx of recent equivocal nuke stress test. No prior cardiac hx and has been sx free since adm. Troponins are neg. Hx of hpt and aodm. No allergy. Nonsmoker. No pulmonary/renal/hepatic/cns/thyroid hx. Strong family hx of cad. No significant surgical hx. Pt active w/o limitations.    PAST MEDICAL & SURGICAL HISTORY:  GERD (gastroesophageal reflux disease)  Gout  HLD (hyperlipidemia)  HTN (hypertension)  DM (diabetes mellitus)  No significant past surgical history      PREVIOUS DIAGNOSTIC TESTING:      ECHO  FINDINGS:    STRESS  FINDINGS:    CATHETERIZATION  FINDINGS: pending    MEDICATIONS  (STANDING):  allopurinol 100 milliGRAM(s) Oral two times a day  aspirin  chewable 81 milliGRAM(s) Oral daily  atorvastatin 40 milliGRAM(s) Oral at bedtime  enoxaparin Injectable 40 milliGRAM(s) SubCutaneous daily  insulin lispro (HumaLOG) corrective regimen sliding scale   SubCutaneous Before meals and at bedtime  metoprolol tartrate 25 milliGRAM(s) Oral two times a day  omega-3-Acid Ethyl Esters 2 Gram(s) Oral two times a day  pantoprazole    Tablet 40 milliGRAM(s) Oral before breakfast  sodium chloride 0.9%. 1000 milliLiter(s) (100 mL/Hr) IV Continuous <Continuous>  valsartan 320 milliGRAM(s) Oral daily    MEDICATIONS  (PRN):      FAMILY HISTORY:  Family history of heart disease (Father, Sibling)      ROS: Negative other than as mentioned in HPI.    Vital Signs Last 24 Hrs  T(C): 36.9 (16 May 2018 07:53), Max: 37.2 (16 May 2018 04:06)  T(F): 98.5 (16 May 2018 07:53), Max: 98.9 (16 May 2018 04:06)  HR: 73 (16 May 2018 07:53) (73 - 87)  BP: 126/74 (16 May 2018 07:53) (126/74 - 151/78)  BP(mean): --  RR: 14 (16 May 2018 07:53) (20 - 26)  SpO2: 98% (16 May 2018 07:53) (97% - 99%)    PHYSICAL EXAM:  General: Appears well developed, well nourished alert and cooperative.  HEENT: Head; normocephalic, atraumatic.  Eyes;   Pupils reactive, cornea wnl.  Neck; Supple, no nodes adenopathy, no NVD or carotid bruit or thyromegaly.  CARDIOVASCULAR; No murmur, rub, gallop or lift. Normal S1 and S2.  LUNGS; No rales, rhonchi or wheeze. Normal breath sounds bilaterally.  ABDOMEN ; Soft, nontender without mass or organomegaly. bowel sounds normoactive.  EXTREMITIES; No clubbing, cyanosis or edema. Distal pulses wnl. ROM normal.  SKIN; warm and dry with normal turgor.  NEURO; Alert/oriented x 3/normal motor exam. No pathologic reflexes.    PSYCH; normal affect.            INTERPRETATION OF TELEMETRY:    ECG: SR wn    I&O's Detail    15 May 2018 07:01  -  16 May 2018 07:00  --------------------------------------------------------  IN:    sodium chloride 0.9%.: 100 mL  Total IN: 100 mL    OUT:  Total OUT: 0 mL    Total NET: 100 mL      16 May 2018 07:01  -  16 May 2018 09:47  --------------------------------------------------------  IN:    sodium chloride 0.9%.: 100 mL  Total IN: 100 mL    OUT:  Total OUT: 0 mL    Total NET: 100 mL          LABS:                        12.9   5.8   )-----------( 147      ( 16 May 2018 08:04 )             38.3     05-16    138  |  102  |  15.0  ----------------------------<  219<H>  3.7   |  24.0  |  0.70    Ca    8.7      16 May 2018 08:04    TPro  7.1  /  Alb  4.2  /  TBili  0.3<L>  /  DBili  x   /  AST  15  /  ALT  22  /  AlkPhos  48  05-14    CARDIAC MARKERS ( 15 May 2018 07:52 )  x     / <0.01 ng/mL / 51 U/L / x     / x      CARDIAC MARKERS ( 14 May 2018 22:17 )  x     / <0.01 ng/mL / 64 U/L / x     / x          PT/INR - ( 14 May 2018 22:17 )   PT: 12.6 sec;   INR: 1.14 ratio         PTT - ( 14 May 2018 22:17 )  PTT:30.2 sec    I&O's Summary    15 May 2018 07:01  -  16 May 2018 07:00  --------------------------------------------------------  IN: 100 mL / OUT: 0 mL / NET: 100 mL    16 May 2018 07:01  -  16 May 2018 09:47  --------------------------------------------------------  IN: 100 mL / OUT: 0 mL / NET: 100 mL        RADIOLOGY & ADDITIONAL STUDIES:

## 2018-05-16 NOTE — DISCHARGE NOTE ADULT - MEDICATION SUMMARY - MEDICATIONS TO STOP TAKING
I will STOP taking the medications listed below when I get home from the hospital:    Onglyza  -- 5  by mouth once a day    pravastatin  -- 10  by mouth once a day    valsartan-hydrochlorothiazide 320mg-12.5mg oral tablet  -- 1 tab(s) by mouth once a day    Low Dose ASA 81 mg oral tablet  -- 1 tab(s) by mouth once a day    Fish Oil

## 2018-05-16 NOTE — DISCHARGE NOTE ADULT - PATIENT PORTAL LINK FT
You can access the [facility], offered by [facility], by registering with the following website: [email or web address]

## 2018-05-16 NOTE — PROGRESS NOTE ADULT - ASSESSMENT
78 year old male with PMH as above.  Presented to the ER this AM with Left shoulder pain radiating to the back.  Troponins negative.  Cardiac cath revealed triple vessel CAD.  CT surgery was consulted for evaluation for surgical intervention.

## 2018-05-16 NOTE — DISCHARGE NOTE ADULT - PLAN OF CARE
resolution You were noted to have chest pain either on arrival or during your hospitalization. Tests to evaluate your heart including blood tests called troponins and EKGs were performed and fortunately you do NOT have signs of heart attack based on these studies. If you have however been instructed to follow up with a Cardiologist for further work up or continued management, it is extremely important that you do so in order to lower your risk of having a heart attack in the future. If you have been prescribed medications for heart health, it is very important that you ALWAYS take them and do not stop doing so unless instructed by a healthcare provider. follow up You were noticed to have blood work with some abnormalities. At this time, you are safe to leave the hospital, we do not suspect that anything immediately will come of these findings, but it is something that should be followed to see if it is getting better, staying the same, or getting worse. Be sure to discuss this at your follow up visit with your Primary Care Doctor to have these tests repeated and to see what work up, if any, is necessary to continue managing it. A1c <8, yours is 8.2 Follow a low carb low sugar diet. Continue to take all antidiabetic medications/insulin as prescribed. Follow up with your Primary Care Doctor regularly for blood sugar/A1c checks. Be sure to see an eye doctor and foot doctor on an annual basis. blood pressure <140/90 Be sure to follow a low salt diet. If you have been prescribed antihypertensive medications to control your blood pressure, be sure to take them every day as prescribed and do not miss any doses, the medications do not work if they are not taken consistently. Follow up with your Primary Care Doctor and have your Blood Pressure checked. control Follow a low fat diet. Continue taking your cholesterol medications as prescribed. Follow up with your Primary Care Doctor to continue having your cholesterol levels checked on a continual basis. Be sure to take precautions to limit/avoid symptoms. This includes eating small, frequent meals instead of 3 large meals per day. This also includes limiting consumption of: spicy foods, alcohol, fatty foods, chocolate, coffee, peppermint, tomatoes/tomato products. Avoid laying down for at least 3 hours after a meal. Also, you may benefit from using medications to control the acid in your stomach called proton pump inhibitors or H2 blockers, some examples of these that can be purchased over the counter include Pantoprazole, Omeprazole, Ranitidine. prevention If you have been prescribed medication to control your gout, be sure to take it as prescribed. Avoid foods that are known to trigger gout flares such as: red meat/organ meat, shellfish, refined carbohydrates (ex. white bread, white rice, pasta, sugar), processed foods (ex. chips, snack foods, frozen dinners), sugary beverages, alcohol (no more that 1-2 drinks in a 24hr period). recover from surgery Please follow up with Dr. Burrows on  Please come to ED or Call Cardio thoracic office at [phone] if Chest pain, Shortness of Breath, persistant Nausea & vomiting, oozing from wounds,palpitations or pain not relieved with medication  Weigh yourself daily>notify surgeons office if  2 lb increase in weight in 24 hours.  Wash incisions with soap and water using washcloth in shower daily  Shower daily, no bathing swimming in a pool or the ocean until instructed by MD.    We advise that you do not drive until instructed by MD.   You may not return to work until instructed by MD.   DO NOT APPLY CREAM POWDER LOTION OR OINTMENT TO ANY SURGICAL WOUND>THIS CAN IMPEDE HEALING AND CAUSE AN INFECTION    Please eat a low fat low salt diet. Please follow up with Dr. Burrows on June 12 at 1230 pm  Please come to ED or Call Cardio thoracic office at [phone] if Chest pain, Shortness of Breath, persistant Nausea & vomiting, oozing from wounds,palpitations or pain not relieved with medication  Weigh yourself daily>notify surgeons office if  2 lb increase in weight in 24 hours.  Wash incisions with soap and water using washcloth in shower daily  Shower daily, no bathing swimming in a pool or the ocean until instructed by MD.    We advise that you do not drive until instructed by MD.   You may not return to work until instructed by MD.   DO NOT APPLY CREAM POWDER LOTION OR OINTMENT TO ANY SURGICAL WOUND>THIS CAN IMPEDE HEALING AND CAUSE AN INFECTION    Please eat a low fat low salt diet. Please follow up with Dr. Burrows on June 12 at 1230 pm  Please follow up with your Cardiologist and Primary Care Physician 2-4 weeks from discharge.    The cardiac surgery office is on the second floor of [facility], [place].   Take the [address] ("G") elevators to 2 and make a left.   Walk down to the second hallway on your left (before the double doors).   Sign says Cardiovascular and Thoracic Surgery. Turn left and walk down the long hallway.  The waiting room is through the double doors toward the end of the hallway.    Please call the Cardiothoracic Surgery office at [phone] if you are experiencing any shortness of breath, chest pain, fevers or chills, drainage from the incisions, persistent nausea, vomiting or if you have any questions about your medications. If the symptoms are severe, call 911 and go to the nearest hospital. You can also call [phone] for an emergency [facility] ambulance, which will take you to the closest [facility].    If you need any assistance for making any appointments for a new consult or referral in any specialty, please call our [facility] Clinical Coordination Center at [phone]. Sugar Control It is extremely important to follow a diabetic (consistent carbohydrates, LOW/NO ADDED SUGAR) diet and monitor your glucose (sugar) levels. You have an increased risk of complications, including wound infections, due to the diabetes.    1. Check your glucoses 3-4 times daily before meals and bedtime.   2. Keep a log of your glucose levels every day.   3. Make an appointment to meet with your primary care provider or endocrinologist within a week.   4. Take ALL of your medications as prescribed. Only hold medications for low glucose levels (< 80) or if you are symptomatic (dizzy, sweating, lightheaded).  5. Ideally, we would like all of the glucose levels to be between .     You may have been discharged on different medications than you were taking before. Your body reacts differently to the medications after surgery. Please continue to take the medications as prescribed and notify the office, nurse practitioner or your PCP if you have any concerns right away. Blood pressure control Please take all medications as prescribed.

## 2018-05-17 ENCOUNTER — APPOINTMENT (OUTPATIENT)
Dept: CARDIOTHORACIC SURGERY | Facility: HOSPITAL | Age: 78
End: 2018-05-17
Payer: MEDICARE

## 2018-05-17 LAB
ABO RH CONFIRMATION: SIGNIFICANT CHANGE UP
ALBUMIN SERPL ELPH-MCNC: 2.9 G/DL — LOW (ref 3.3–5.2)
ALP SERPL-CCNC: 28 U/L — LOW (ref 40–120)
ALT FLD-CCNC: 15 U/L — SIGNIFICANT CHANGE UP
ANION GAP SERPL CALC-SCNC: 11 MMOL/L — SIGNIFICANT CHANGE UP (ref 5–17)
ANION GAP SERPL CALC-SCNC: 15 MMOL/L — SIGNIFICANT CHANGE UP (ref 5–17)
APTT BLD: 34.3 SEC — SIGNIFICANT CHANGE UP (ref 27.5–37.4)
AST SERPL-CCNC: 33 U/L — SIGNIFICANT CHANGE UP
BILIRUB SERPL-MCNC: 0.8 MG/DL — SIGNIFICANT CHANGE UP (ref 0.4–2)
BLD GP AB SCN SERPL QL: SIGNIFICANT CHANGE UP
BUN SERPL-MCNC: 14 MG/DL — SIGNIFICANT CHANGE UP (ref 8–20)
BUN SERPL-MCNC: 17 MG/DL — SIGNIFICANT CHANGE UP (ref 8–20)
CALCIUM SERPL-MCNC: 8.5 MG/DL — LOW (ref 8.6–10.2)
CALCIUM SERPL-MCNC: 9.4 MG/DL — SIGNIFICANT CHANGE UP (ref 8.6–10.2)
CHLORIDE SERPL-SCNC: 108 MMOL/L — HIGH (ref 98–107)
CHLORIDE SERPL-SCNC: 99 MMOL/L — SIGNIFICANT CHANGE UP (ref 98–107)
CK MB CFR SERPL CALC: 28.8 NG/ML — HIGH (ref 0–6.7)
CK SERPL-CCNC: 278 U/L — HIGH (ref 30–200)
CO2 SERPL-SCNC: 22 MMOL/L — SIGNIFICANT CHANGE UP (ref 22–29)
CO2 SERPL-SCNC: 24 MMOL/L — SIGNIFICANT CHANGE UP (ref 22–29)
CREAT SERPL-MCNC: 0.5 MG/DL — SIGNIFICANT CHANGE UP (ref 0.5–1.3)
CREAT SERPL-MCNC: 0.82 MG/DL — SIGNIFICANT CHANGE UP (ref 0.5–1.3)
GAS PNL BLDA: SIGNIFICANT CHANGE UP
GAS PNL BLDA: SIGNIFICANT CHANGE UP
GLUCOSE BLDC GLUCOMTR-MCNC: 120 MG/DL — HIGH (ref 70–99)
GLUCOSE BLDC GLUCOMTR-MCNC: 121 MG/DL — HIGH (ref 70–99)
GLUCOSE BLDC GLUCOMTR-MCNC: 122 MG/DL — HIGH (ref 70–99)
GLUCOSE BLDC GLUCOMTR-MCNC: 140 MG/DL — HIGH (ref 70–99)
GLUCOSE BLDC GLUCOMTR-MCNC: 141 MG/DL — HIGH (ref 70–99)
GLUCOSE BLDC GLUCOMTR-MCNC: 142 MG/DL — HIGH (ref 70–99)
GLUCOSE BLDC GLUCOMTR-MCNC: 144 MG/DL — HIGH (ref 70–99)
GLUCOSE BLDC GLUCOMTR-MCNC: 216 MG/DL — HIGH (ref 70–99)
GLUCOSE SERPL-MCNC: 154 MG/DL — HIGH (ref 70–115)
GLUCOSE SERPL-MCNC: 213 MG/DL — HIGH (ref 70–115)
HCT VFR BLD CALC: 26.8 % — LOW (ref 42–52)
HCT VFR BLD CALC: 40.9 % — LOW (ref 42–52)
HGB BLD-MCNC: 14 G/DL — SIGNIFICANT CHANGE UP (ref 14–18)
HGB BLD-MCNC: 9.4 G/DL — LOW (ref 14–18)
INR BLD: 1.76 RATIO — HIGH (ref 0.88–1.16)
MAGNESIUM SERPL-MCNC: 2.3 MG/DL — SIGNIFICANT CHANGE UP (ref 1.6–2.6)
MCHC RBC-ENTMCNC: 29.2 PG — SIGNIFICANT CHANGE UP (ref 27–31)
MCHC RBC-ENTMCNC: 29.5 PG — SIGNIFICANT CHANGE UP (ref 27–31)
MCHC RBC-ENTMCNC: 34.2 G/DL — SIGNIFICANT CHANGE UP (ref 32–36)
MCHC RBC-ENTMCNC: 35.1 G/DL — SIGNIFICANT CHANGE UP (ref 32–36)
MCV RBC AUTO: 84 FL — SIGNIFICANT CHANGE UP (ref 80–94)
MCV RBC AUTO: 85.2 FL — SIGNIFICANT CHANGE UP (ref 80–94)
PLATELET # BLD AUTO: 158 K/UL — SIGNIFICANT CHANGE UP (ref 150–400)
PLATELET # BLD AUTO: 91 K/UL — LOW (ref 150–400)
POTASSIUM SERPL-MCNC: 3.9 MMOL/L — SIGNIFICANT CHANGE UP (ref 3.5–5.3)
POTASSIUM SERPL-MCNC: 4.3 MMOL/L — SIGNIFICANT CHANGE UP (ref 3.5–5.3)
POTASSIUM SERPL-SCNC: 3.9 MMOL/L — SIGNIFICANT CHANGE UP (ref 3.5–5.3)
POTASSIUM SERPL-SCNC: 4.3 MMOL/L — SIGNIFICANT CHANGE UP (ref 3.5–5.3)
PROT SERPL-MCNC: 4.4 G/DL — LOW (ref 6.6–8.7)
PROTHROM AB SERPL-ACNC: 19.6 SEC — HIGH (ref 9.8–12.7)
RBC # BLD: 3.19 M/UL — LOW (ref 4.6–6.2)
RBC # BLD: 4.8 M/UL — SIGNIFICANT CHANGE UP (ref 4.6–6.2)
RBC # FLD: 13.8 % — SIGNIFICANT CHANGE UP (ref 11–15.6)
RBC # FLD: 14 % — SIGNIFICANT CHANGE UP (ref 11–15.6)
SODIUM SERPL-SCNC: 138 MMOL/L — SIGNIFICANT CHANGE UP (ref 135–145)
SODIUM SERPL-SCNC: 141 MMOL/L — SIGNIFICANT CHANGE UP (ref 135–145)
TROPONIN T SERPL-MCNC: 0.67 NG/ML — HIGH (ref 0–0.06)
TSH SERPL-MCNC: 3.49 UIU/ML — SIGNIFICANT CHANGE UP (ref 0.27–4.2)
TYPE + AB SCN PNL BLD: SIGNIFICANT CHANGE UP
URATE SERPL-MCNC: 5.4 MG/DL — SIGNIFICANT CHANGE UP (ref 3.4–7)
WBC # BLD: 7.4 K/UL — SIGNIFICANT CHANGE UP (ref 4.8–10.8)
WBC # BLD: 9 K/UL — SIGNIFICANT CHANGE UP (ref 4.8–10.8)
WBC # FLD AUTO: 7.4 K/UL — SIGNIFICANT CHANGE UP (ref 4.8–10.8)
WBC # FLD AUTO: 9 K/UL — SIGNIFICANT CHANGE UP (ref 4.8–10.8)

## 2018-05-17 PROCEDURE — 33518 CABG ARTERY-VEIN TWO: CPT

## 2018-05-17 PROCEDURE — 33533 CABG ARTERIAL SINGLE: CPT | Mod: 26,59

## 2018-05-17 PROCEDURE — 71045 X-RAY EXAM CHEST 1 VIEW: CPT | Mod: 26,77

## 2018-05-17 PROCEDURE — 71045 X-RAY EXAM CHEST 1 VIEW: CPT | Mod: 26

## 2018-05-17 PROCEDURE — 33533 CABG ARTERIAL SINGLE: CPT | Mod: AS

## 2018-05-17 PROCEDURE — 33518 CABG ARTERY-VEIN TWO: CPT | Mod: AS

## 2018-05-17 PROCEDURE — 33508 ENDOSCOPIC VEIN HARVEST: CPT | Mod: 26,59

## 2018-05-17 PROCEDURE — 93010 ELECTROCARDIOGRAM REPORT: CPT

## 2018-05-17 PROCEDURE — 33508 ENDOSCOPIC VEIN HARVEST: CPT | Mod: AS

## 2018-05-17 RX ORDER — PANTOPRAZOLE SODIUM 20 MG/1
40 TABLET, DELAYED RELEASE ORAL DAILY
Qty: 0 | Refills: 0 | Status: DISCONTINUED | OUTPATIENT
Start: 2018-05-18 | End: 2018-05-22

## 2018-05-17 RX ORDER — ASPIRIN/CALCIUM CARB/MAGNESIUM 324 MG
325 TABLET ORAL DAILY
Qty: 0 | Refills: 0 | Status: DISCONTINUED | OUTPATIENT
Start: 2018-05-18 | End: 2018-05-22

## 2018-05-17 RX ORDER — VANCOMYCIN HCL 1 G
1000 VIAL (EA) INTRAVENOUS EVERY 12 HOURS
Qty: 0 | Refills: 0 | Status: COMPLETED | OUTPATIENT
Start: 2018-05-17 | End: 2018-05-19

## 2018-05-17 RX ORDER — ALBUMIN HUMAN 25 %
250 VIAL (ML) INTRAVENOUS ONCE
Qty: 0 | Refills: 0 | Status: COMPLETED | OUTPATIENT
Start: 2018-05-17 | End: 2018-05-17

## 2018-05-17 RX ORDER — AMIODARONE HYDROCHLORIDE 400 MG/1
150 TABLET ORAL ONCE
Qty: 0 | Refills: 0 | Status: COMPLETED | OUTPATIENT
Start: 2018-05-17 | End: 2018-05-17

## 2018-05-17 RX ORDER — SODIUM CHLORIDE 9 MG/ML
500 INJECTION, SOLUTION INTRAVENOUS ONCE
Qty: 0 | Refills: 0 | Status: DISCONTINUED | OUTPATIENT
Start: 2018-05-17 | End: 2018-05-19

## 2018-05-17 RX ORDER — SENNA PLUS 8.6 MG/1
2 TABLET ORAL AT BEDTIME
Qty: 0 | Refills: 0 | Status: DISCONTINUED | OUTPATIENT
Start: 2018-05-18 | End: 2018-05-22

## 2018-05-17 RX ORDER — FENTANYL CITRATE 50 UG/ML
50 INJECTION INTRAVENOUS
Qty: 0 | Refills: 0 | Status: DISCONTINUED | OUTPATIENT
Start: 2018-05-17 | End: 2018-05-18

## 2018-05-17 RX ORDER — ASPIRIN/CALCIUM CARB/MAGNESIUM 324 MG
325 TABLET ORAL ONCE
Qty: 0 | Refills: 0 | Status: COMPLETED | OUTPATIENT
Start: 2018-05-17 | End: 2018-05-17

## 2018-05-17 RX ORDER — CEFUROXIME AXETIL 250 MG
1500 TABLET ORAL EVERY 8 HOURS
Qty: 0 | Refills: 0 | Status: COMPLETED | OUTPATIENT
Start: 2018-05-17 | End: 2018-05-19

## 2018-05-17 RX ORDER — SODIUM CHLORIDE 9 MG/ML
500 INJECTION, SOLUTION INTRAVENOUS ONCE
Qty: 0 | Refills: 0 | Status: COMPLETED | OUTPATIENT
Start: 2018-05-17 | End: 2018-05-17

## 2018-05-17 RX ORDER — SODIUM CHLORIDE 9 MG/ML
1000 INJECTION INTRAMUSCULAR; INTRAVENOUS; SUBCUTANEOUS
Qty: 0 | Refills: 0 | Status: DISCONTINUED | OUTPATIENT
Start: 2018-05-17 | End: 2018-05-19

## 2018-05-17 RX ORDER — POTASSIUM CHLORIDE 20 MEQ
10 PACKET (EA) ORAL
Qty: 0 | Refills: 0 | Status: DISCONTINUED | OUTPATIENT
Start: 2018-05-17 | End: 2018-05-18

## 2018-05-17 RX ORDER — ATORVASTATIN CALCIUM 80 MG/1
40 TABLET, FILM COATED ORAL AT BEDTIME
Qty: 0 | Refills: 0 | Status: DISCONTINUED | OUTPATIENT
Start: 2018-05-17 | End: 2018-05-22

## 2018-05-17 RX ORDER — PANTOPRAZOLE SODIUM 20 MG/1
40 TABLET, DELAYED RELEASE ORAL ONCE
Qty: 0 | Refills: 0 | Status: COMPLETED | OUTPATIENT
Start: 2018-05-17 | End: 2018-05-17

## 2018-05-17 RX ORDER — POLYETHYLENE GLYCOL 3350 17 G/17G
17 POWDER, FOR SOLUTION ORAL DAILY
Qty: 0 | Refills: 0 | Status: DISCONTINUED | OUTPATIENT
Start: 2018-05-19 | End: 2018-05-22

## 2018-05-17 RX ORDER — ALBUMIN HUMAN 25 %
250 VIAL (ML) INTRAVENOUS ONCE
Qty: 0 | Refills: 0 | Status: COMPLETED | OUTPATIENT
Start: 2018-05-17

## 2018-05-17 RX ORDER — DOCUSATE SODIUM 100 MG
100 CAPSULE ORAL THREE TIMES A DAY
Qty: 0 | Refills: 0 | Status: DISCONTINUED | OUTPATIENT
Start: 2018-05-17 | End: 2018-05-22

## 2018-05-17 RX ORDER — COLCHICINE 0.6 MG
0.6 TABLET ORAL ONCE
Qty: 0 | Refills: 0 | Status: COMPLETED | OUTPATIENT
Start: 2018-05-17 | End: 2018-05-17

## 2018-05-17 RX ORDER — CHLORHEXIDINE GLUCONATE 213 G/1000ML
5 SOLUTION TOPICAL EVERY 4 HOURS
Qty: 0 | Refills: 0 | Status: DISCONTINUED | OUTPATIENT
Start: 2018-05-17 | End: 2018-05-18

## 2018-05-17 RX ORDER — POTASSIUM CHLORIDE 20 MEQ
10 PACKET (EA) ORAL
Qty: 0 | Refills: 0 | Status: COMPLETED | OUTPATIENT
Start: 2018-05-17 | End: 2018-05-18

## 2018-05-17 RX ORDER — ACETAMINOPHEN 500 MG
1000 TABLET ORAL ONCE
Qty: 0 | Refills: 0 | Status: COMPLETED | OUTPATIENT
Start: 2018-05-17 | End: 2018-05-17

## 2018-05-17 RX ORDER — INSULIN LISPRO 100/ML
3 VIAL (ML) SUBCUTANEOUS ONCE
Qty: 0 | Refills: 0 | Status: COMPLETED | OUTPATIENT
Start: 2018-05-17 | End: 2018-05-17

## 2018-05-17 RX ORDER — NOREPINEPHRINE BITARTRATE/D5W 8 MG/250ML
0.05 PLASTIC BAG, INJECTION (ML) INTRAVENOUS
Qty: 8 | Refills: 0 | Status: DISCONTINUED | OUTPATIENT
Start: 2018-05-17 | End: 2018-05-18

## 2018-05-17 RX ORDER — MEPERIDINE HYDROCHLORIDE 50 MG/ML
25 INJECTION INTRAMUSCULAR; INTRAVENOUS; SUBCUTANEOUS ONCE
Qty: 0 | Refills: 0 | Status: DISCONTINUED | OUTPATIENT
Start: 2018-05-17 | End: 2018-05-17

## 2018-05-17 RX ORDER — INSULIN HUMAN 100 [IU]/ML
2 INJECTION, SOLUTION SUBCUTANEOUS
Qty: 100 | Refills: 0 | Status: DISCONTINUED | OUTPATIENT
Start: 2018-05-17 | End: 2018-05-19

## 2018-05-17 RX ORDER — POTASSIUM CHLORIDE 20 MEQ
10 PACKET (EA) ORAL
Qty: 0 | Refills: 0 | Status: COMPLETED | OUTPATIENT
Start: 2018-05-17 | End: 2018-05-17

## 2018-05-17 RX ORDER — NOREPINEPHRINE BITARTRATE/D5W 8 MG/250ML
0.05 PLASTIC BAG, INJECTION (ML) INTRAVENOUS
Qty: 8 | Refills: 0 | Status: DISCONTINUED | OUTPATIENT
Start: 2018-05-17 | End: 2018-05-17

## 2018-05-17 RX ORDER — AMIODARONE HYDROCHLORIDE 400 MG/1
400 TABLET ORAL EVERY 8 HOURS
Qty: 0 | Refills: 0 | Status: DISCONTINUED | OUTPATIENT
Start: 2018-05-17 | End: 2018-05-18

## 2018-05-17 RX ORDER — ONDANSETRON 8 MG/1
4 TABLET, FILM COATED ORAL EVERY 6 HOURS
Qty: 0 | Refills: 0 | Status: DISCONTINUED | OUTPATIENT
Start: 2018-05-17 | End: 2018-05-22

## 2018-05-17 RX ORDER — ALLOPURINOL 300 MG
100 TABLET ORAL
Qty: 0 | Refills: 0 | Status: DISCONTINUED | OUTPATIENT
Start: 2018-05-18 | End: 2018-05-22

## 2018-05-17 RX ADMIN — AMIODARONE HYDROCHLORIDE 618 MILLIGRAM(S): 400 TABLET ORAL at 22:45

## 2018-05-17 RX ADMIN — Medication 125 MILLILITER(S): at 16:11

## 2018-05-17 RX ADMIN — Medication 100 MILLIGRAM(S): at 16:05

## 2018-05-17 RX ADMIN — SODIUM CHLORIDE 3 MILLILITER(S): 9 INJECTION INTRAMUSCULAR; INTRAVENOUS; SUBCUTANEOUS at 06:01

## 2018-05-17 RX ADMIN — PANTOPRAZOLE SODIUM 40 MILLIGRAM(S): 20 TABLET, DELAYED RELEASE ORAL at 06:01

## 2018-05-17 RX ADMIN — CHLORHEXIDINE GLUCONATE 5 MILLILITER(S): 213 SOLUTION TOPICAL at 18:32

## 2018-05-17 RX ADMIN — FENTANYL CITRATE 50 MICROGRAM(S): 50 INJECTION INTRAVENOUS at 14:00

## 2018-05-17 RX ADMIN — CHLORHEXIDINE GLUCONATE 15 MILLILITER(S): 213 SOLUTION TOPICAL at 06:02

## 2018-05-17 RX ADMIN — INSULIN HUMAN 2 UNIT(S)/HR: 100 INJECTION, SOLUTION SUBCUTANEOUS at 19:15

## 2018-05-17 RX ADMIN — SODIUM CHLORIDE 2000 MILLILITER(S): 9 INJECTION, SOLUTION INTRAVENOUS at 14:44

## 2018-05-17 RX ADMIN — MUPIROCIN 1 APPLICATION(S): 20 OINTMENT TOPICAL at 06:02

## 2018-05-17 RX ADMIN — CHLORHEXIDINE GLUCONATE 5 MILLILITER(S): 213 SOLUTION TOPICAL at 14:43

## 2018-05-17 RX ADMIN — Medication 0.6 MILLIGRAM(S): at 06:01

## 2018-05-17 RX ADMIN — Medication 25 MILLIGRAM(S): at 06:01

## 2018-05-17 RX ADMIN — PANTOPRAZOLE SODIUM 40 MILLIGRAM(S): 20 TABLET, DELAYED RELEASE ORAL at 14:42

## 2018-05-17 RX ADMIN — Medication 100 MILLIGRAM(S): at 23:10

## 2018-05-17 RX ADMIN — FENTANYL CITRATE 50 MICROGRAM(S): 50 INJECTION INTRAVENOUS at 14:15

## 2018-05-17 RX ADMIN — Medication 100 MILLIEQUIVALENT(S): at 14:46

## 2018-05-17 RX ADMIN — Medication 100 MILLIEQUIVALENT(S): at 14:42

## 2018-05-17 RX ADMIN — Medication 100 MILLIGRAM(S): at 06:02

## 2018-05-17 RX ADMIN — Medication 3 UNIT(S): at 06:37

## 2018-05-17 RX ADMIN — Medication 125 MILLILITER(S): at 16:12

## 2018-05-17 RX ADMIN — Medication 1000 MILLIGRAM(S): at 22:15

## 2018-05-17 RX ADMIN — Medication 250 MILLIGRAM(S): at 20:24

## 2018-05-17 RX ADMIN — CHLORHEXIDINE GLUCONATE 5 MILLILITER(S): 213 SOLUTION TOPICAL at 22:05

## 2018-05-17 RX ADMIN — Medication 7.31 MICROGRAM(S)/KG/MIN: at 19:15

## 2018-05-17 RX ADMIN — Medication 325 MILLIGRAM(S): at 18:33

## 2018-05-17 RX ADMIN — Medication 400 MILLIGRAM(S): at 22:00

## 2018-05-17 RX ADMIN — Medication 100 MILLIEQUIVALENT(S): at 23:00

## 2018-05-17 RX ADMIN — CHLORHEXIDINE GLUCONATE 1 APPLICATION(S): 213 SOLUTION TOPICAL at 06:02

## 2018-05-17 NOTE — AIRWAY REMOVAL NOTE  ADULT & PEDS - ARTIFICAL AIRWAY REMOVAL COMMENTS
Reason for artificial airway has been resolved. Patient is resting comfortable vitals are as follow spo2 98%, Pulse 83bpm, RR 17bpm , placed on aerosol mask 40% . No Respiratory distress noted

## 2018-05-17 NOTE — BRIEF OPERATIVE NOTE - PRE-OP DX
Coronary artery disease involving native coronary artery of native heart with angina pectoris  05/17/2018    Active  Destin Renteria

## 2018-05-17 NOTE — BRIEF OPERATIVE NOTE - PROCEDURE
<<-----Click on this checkbox to enter Procedure CABG  05/17/2018  LIMA to the LAD, SVG to PDA and OM  Active  HALEYO

## 2018-05-18 DIAGNOSIS — Z29.9 ENCOUNTER FOR PROPHYLACTIC MEASURES, UNSPECIFIED: ICD-10-CM

## 2018-05-18 DIAGNOSIS — M10.9 GOUT, UNSPECIFIED: ICD-10-CM

## 2018-05-18 DIAGNOSIS — I10 ESSENTIAL (PRIMARY) HYPERTENSION: ICD-10-CM

## 2018-05-18 DIAGNOSIS — D50.0 IRON DEFICIENCY ANEMIA SECONDARY TO BLOOD LOSS (CHRONIC): ICD-10-CM

## 2018-05-18 DIAGNOSIS — E78.5 HYPERLIPIDEMIA, UNSPECIFIED: ICD-10-CM

## 2018-05-18 LAB
ALBUMIN SERPL ELPH-MCNC: 3.4 G/DL — SIGNIFICANT CHANGE UP (ref 3.3–5.2)
ALP SERPL-CCNC: 28 U/L — LOW (ref 40–120)
ALT FLD-CCNC: 16 U/L — SIGNIFICANT CHANGE UP
ANION GAP SERPL CALC-SCNC: 12 MMOL/L — SIGNIFICANT CHANGE UP (ref 5–17)
AST SERPL-CCNC: 30 U/L — SIGNIFICANT CHANGE UP
BILIRUB SERPL-MCNC: 0.6 MG/DL — SIGNIFICANT CHANGE UP (ref 0.4–2)
BUN SERPL-MCNC: 17 MG/DL — SIGNIFICANT CHANGE UP (ref 8–20)
CALCIUM SERPL-MCNC: 7.9 MG/DL — LOW (ref 8.6–10.2)
CHLORIDE SERPL-SCNC: 103 MMOL/L — SIGNIFICANT CHANGE UP (ref 98–107)
CK MB CFR SERPL CALC: 19.7 NG/ML — HIGH (ref 0–6.7)
CK SERPL-CCNC: 349 U/L — HIGH (ref 30–200)
CO2 SERPL-SCNC: 23 MMOL/L — SIGNIFICANT CHANGE UP (ref 22–29)
CREAT SERPL-MCNC: 0.6 MG/DL — SIGNIFICANT CHANGE UP (ref 0.5–1.3)
GLUCOSE BLDC GLUCOMTR-MCNC: 107 MG/DL — HIGH (ref 70–99)
GLUCOSE BLDC GLUCOMTR-MCNC: 108 MG/DL — HIGH (ref 70–99)
GLUCOSE BLDC GLUCOMTR-MCNC: 129 MG/DL — HIGH (ref 70–99)
GLUCOSE BLDC GLUCOMTR-MCNC: 139 MG/DL — HIGH (ref 70–99)
GLUCOSE BLDC GLUCOMTR-MCNC: 149 MG/DL — HIGH (ref 70–99)
GLUCOSE BLDC GLUCOMTR-MCNC: 154 MG/DL — HIGH (ref 70–99)
GLUCOSE BLDC GLUCOMTR-MCNC: 158 MG/DL — HIGH (ref 70–99)
GLUCOSE BLDC GLUCOMTR-MCNC: 225 MG/DL — HIGH (ref 70–99)
GLUCOSE BLDC GLUCOMTR-MCNC: 227 MG/DL — HIGH (ref 70–99)
GLUCOSE BLDC GLUCOMTR-MCNC: 247 MG/DL — HIGH (ref 70–99)
GLUCOSE BLDC GLUCOMTR-MCNC: 283 MG/DL — HIGH (ref 70–99)
GLUCOSE BLDC GLUCOMTR-MCNC: 289 MG/DL — HIGH (ref 70–99)
GLUCOSE BLDC GLUCOMTR-MCNC: 297 MG/DL — HIGH (ref 70–99)
GLUCOSE BLDC GLUCOMTR-MCNC: 319 MG/DL — HIGH (ref 70–99)
GLUCOSE BLDC GLUCOMTR-MCNC: 320 MG/DL — HIGH (ref 70–99)
GLUCOSE BLDC GLUCOMTR-MCNC: 366 MG/DL — HIGH (ref 70–99)
GLUCOSE SERPL-MCNC: 106 MG/DL — SIGNIFICANT CHANGE UP (ref 70–115)
HCT VFR BLD CALC: 28.5 % — LOW (ref 42–52)
HGB BLD-MCNC: 9.8 G/DL — LOW (ref 14–18)
MAGNESIUM SERPL-MCNC: 1.8 MG/DL — SIGNIFICANT CHANGE UP (ref 1.6–2.6)
MCHC RBC-ENTMCNC: 28.7 PG — SIGNIFICANT CHANGE UP (ref 27–31)
MCHC RBC-ENTMCNC: 34.4 G/DL — SIGNIFICANT CHANGE UP (ref 32–36)
MCV RBC AUTO: 83.6 FL — SIGNIFICANT CHANGE UP (ref 80–94)
PLATELET # BLD AUTO: 109 K/UL — LOW (ref 150–400)
POTASSIUM SERPL-MCNC: 3.8 MMOL/L — SIGNIFICANT CHANGE UP (ref 3.5–5.3)
POTASSIUM SERPL-SCNC: 3.8 MMOL/L — SIGNIFICANT CHANGE UP (ref 3.5–5.3)
PROT SERPL-MCNC: 5.1 G/DL — LOW (ref 6.6–8.7)
RBC # BLD: 3.41 M/UL — LOW (ref 4.6–6.2)
RBC # FLD: 14.1 % — SIGNIFICANT CHANGE UP (ref 11–15.6)
SODIUM SERPL-SCNC: 138 MMOL/L — SIGNIFICANT CHANGE UP (ref 135–145)
TROPONIN T SERPL-MCNC: 0.42 NG/ML — HIGH (ref 0–0.06)
WBC # BLD: 9.1 K/UL — SIGNIFICANT CHANGE UP (ref 4.8–10.8)
WBC # FLD AUTO: 9.1 K/UL — SIGNIFICANT CHANGE UP (ref 4.8–10.8)

## 2018-05-18 PROCEDURE — 93010 ELECTROCARDIOGRAM REPORT: CPT

## 2018-05-18 PROCEDURE — 71045 X-RAY EXAM CHEST 1 VIEW: CPT | Mod: 26,76

## 2018-05-18 RX ORDER — FUROSEMIDE 40 MG
20 TABLET ORAL ONCE
Qty: 0 | Refills: 0 | Status: COMPLETED | OUTPATIENT
Start: 2018-05-18 | End: 2018-05-18

## 2018-05-18 RX ORDER — METFORMIN HYDROCHLORIDE 850 MG/1
1000 TABLET ORAL
Qty: 0 | Refills: 0 | Status: DISCONTINUED | OUTPATIENT
Start: 2018-05-18 | End: 2018-05-22

## 2018-05-18 RX ORDER — MAGNESIUM SULFATE 500 MG/ML
2 VIAL (ML) INJECTION ONCE
Qty: 0 | Refills: 0 | Status: COMPLETED | OUTPATIENT
Start: 2018-05-18 | End: 2018-05-18

## 2018-05-18 RX ORDER — INSULIN GLARGINE 100 [IU]/ML
30 INJECTION, SOLUTION SUBCUTANEOUS AT BEDTIME
Qty: 0 | Refills: 0 | Status: DISCONTINUED | OUTPATIENT
Start: 2018-05-19 | End: 2018-05-20

## 2018-05-18 RX ORDER — OXYCODONE AND ACETAMINOPHEN 5; 325 MG/1; MG/1
1 TABLET ORAL EVERY 6 HOURS
Qty: 0 | Refills: 0 | Status: DISCONTINUED | OUTPATIENT
Start: 2018-05-18 | End: 2018-05-22

## 2018-05-18 RX ORDER — OXYCODONE AND ACETAMINOPHEN 5; 325 MG/1; MG/1
2 TABLET ORAL EVERY 6 HOURS
Qty: 0 | Refills: 0 | Status: DISCONTINUED | OUTPATIENT
Start: 2018-05-18 | End: 2018-05-22

## 2018-05-18 RX ORDER — HYDROMORPHONE HYDROCHLORIDE 2 MG/ML
0.5 INJECTION INTRAMUSCULAR; INTRAVENOUS; SUBCUTANEOUS ONCE
Qty: 0 | Refills: 0 | Status: DISCONTINUED | OUTPATIENT
Start: 2018-05-18 | End: 2018-05-18

## 2018-05-18 RX ORDER — INSULIN GLARGINE 100 [IU]/ML
6 INJECTION, SOLUTION SUBCUTANEOUS ONCE
Qty: 0 | Refills: 0 | Status: COMPLETED | OUTPATIENT
Start: 2018-05-18 | End: 2018-05-18

## 2018-05-18 RX ORDER — METOPROLOL TARTRATE 50 MG
5 TABLET ORAL ONCE
Qty: 0 | Refills: 0 | Status: COMPLETED | OUTPATIENT
Start: 2018-05-18 | End: 2018-05-18

## 2018-05-18 RX ORDER — INSULIN LISPRO 100/ML
6 VIAL (ML) SUBCUTANEOUS
Qty: 0 | Refills: 0 | Status: DISCONTINUED | OUTPATIENT
Start: 2018-05-18 | End: 2018-05-18

## 2018-05-18 RX ORDER — INSULIN LISPRO 100/ML
8 VIAL (ML) SUBCUTANEOUS
Qty: 0 | Refills: 0 | Status: DISCONTINUED | OUTPATIENT
Start: 2018-05-18 | End: 2018-05-18

## 2018-05-18 RX ORDER — INSULIN GLARGINE 100 [IU]/ML
24 INJECTION, SOLUTION SUBCUTANEOUS AT BEDTIME
Qty: 0 | Refills: 0 | Status: DISCONTINUED | OUTPATIENT
Start: 2018-05-18 | End: 2018-05-18

## 2018-05-18 RX ORDER — ENOXAPARIN SODIUM 100 MG/ML
40 INJECTION SUBCUTANEOUS DAILY
Qty: 0 | Refills: 0 | Status: DISCONTINUED | OUTPATIENT
Start: 2018-05-18 | End: 2018-05-22

## 2018-05-18 RX ORDER — METOPROLOL TARTRATE 50 MG
25 TABLET ORAL EVERY 8 HOURS
Qty: 0 | Refills: 0 | Status: DISCONTINUED | OUTPATIENT
Start: 2018-05-18 | End: 2018-05-19

## 2018-05-18 RX ORDER — MUPIROCIN 20 MG/G
1 OINTMENT TOPICAL
Qty: 0 | Refills: 0 | Status: DISCONTINUED | OUTPATIENT
Start: 2018-05-18 | End: 2018-05-19

## 2018-05-18 RX ORDER — INSULIN LISPRO 100/ML
10 VIAL (ML) SUBCUTANEOUS
Qty: 0 | Refills: 0 | Status: DISCONTINUED | OUTPATIENT
Start: 2018-05-18 | End: 2018-05-20

## 2018-05-18 RX ORDER — POTASSIUM CHLORIDE 20 MEQ
10 PACKET (EA) ORAL
Qty: 0 | Refills: 0 | Status: COMPLETED | OUTPATIENT
Start: 2018-05-18 | End: 2018-05-18

## 2018-05-18 RX ORDER — FUROSEMIDE 40 MG
20 TABLET ORAL DAILY
Qty: 0 | Refills: 0 | Status: DISCONTINUED | OUTPATIENT
Start: 2018-05-18 | End: 2018-05-18

## 2018-05-18 RX ORDER — INSULIN GLARGINE 100 [IU]/ML
16 INJECTION, SOLUTION SUBCUTANEOUS AT BEDTIME
Qty: 0 | Refills: 0 | Status: DISCONTINUED | OUTPATIENT
Start: 2018-05-18 | End: 2018-05-18

## 2018-05-18 RX ORDER — FUROSEMIDE 40 MG
20 TABLET ORAL DAILY
Qty: 0 | Refills: 0 | Status: DISCONTINUED | OUTPATIENT
Start: 2018-05-19 | End: 2018-05-19

## 2018-05-18 RX ORDER — INSULIN LISPRO 100/ML
2 VIAL (ML) SUBCUTANEOUS
Qty: 0 | Refills: 0 | Status: DISCONTINUED | OUTPATIENT
Start: 2018-05-18 | End: 2018-05-18

## 2018-05-18 RX ORDER — METOPROLOL TARTRATE 50 MG
25 TABLET ORAL
Qty: 0 | Refills: 0 | Status: DISCONTINUED | OUTPATIENT
Start: 2018-05-18 | End: 2018-05-18

## 2018-05-18 RX ADMIN — METFORMIN HYDROCHLORIDE 1000 MILLIGRAM(S): 850 TABLET ORAL at 09:47

## 2018-05-18 RX ADMIN — Medication 20 MILLIGRAM(S): at 12:08

## 2018-05-18 RX ADMIN — Medication 100 MILLIGRAM(S): at 08:06

## 2018-05-18 RX ADMIN — Medication 25 MILLIGRAM(S): at 16:32

## 2018-05-18 RX ADMIN — INSULIN HUMAN 2 UNIT(S)/HR: 100 INJECTION, SOLUTION SUBCUTANEOUS at 15:16

## 2018-05-18 RX ADMIN — Medication 5 MILLIGRAM(S): at 11:09

## 2018-05-18 RX ADMIN — AMIODARONE HYDROCHLORIDE 400 MILLIGRAM(S): 400 TABLET ORAL at 00:27

## 2018-05-18 RX ADMIN — ATORVASTATIN CALCIUM 40 MILLIGRAM(S): 80 TABLET, FILM COATED ORAL at 00:27

## 2018-05-18 RX ADMIN — Medication 250 MILLIGRAM(S): at 18:09

## 2018-05-18 RX ADMIN — INSULIN GLARGINE 6 UNIT(S): 100 INJECTION, SOLUTION SUBCUTANEOUS at 23:29

## 2018-05-18 RX ADMIN — Medication 100 MILLIGRAM(S): at 18:08

## 2018-05-18 RX ADMIN — OXYCODONE AND ACETAMINOPHEN 2 TABLET(S): 5; 325 TABLET ORAL at 18:08

## 2018-05-18 RX ADMIN — Medication 100 MILLIGRAM(S): at 21:06

## 2018-05-18 RX ADMIN — SENNA PLUS 2 TABLET(S): 8.6 TABLET ORAL at 21:06

## 2018-05-18 RX ADMIN — Medication 100 MILLIEQUIVALENT(S): at 04:00

## 2018-05-18 RX ADMIN — PANTOPRAZOLE SODIUM 40 MILLIGRAM(S): 20 TABLET, DELAYED RELEASE ORAL at 11:11

## 2018-05-18 RX ADMIN — Medication 50 GRAM(S): at 03:55

## 2018-05-18 RX ADMIN — HYDROMORPHONE HYDROCHLORIDE 0.5 MILLIGRAM(S): 2 INJECTION INTRAMUSCULAR; INTRAVENOUS; SUBCUTANEOUS at 09:03

## 2018-05-18 RX ADMIN — Medication 100 MILLIGRAM(S): at 15:16

## 2018-05-18 RX ADMIN — Medication 100 MILLIGRAM(S): at 00:26

## 2018-05-18 RX ADMIN — HYDROMORPHONE HYDROCHLORIDE 0.5 MILLIGRAM(S): 2 INJECTION INTRAMUSCULAR; INTRAVENOUS; SUBCUTANEOUS at 12:44

## 2018-05-18 RX ADMIN — Medication 25 MILLIGRAM(S): at 09:47

## 2018-05-18 RX ADMIN — Medication 325 MILLIGRAM(S): at 11:11

## 2018-05-18 RX ADMIN — Medication 100 MILLIGRAM(S): at 05:42

## 2018-05-18 RX ADMIN — Medication 100 MILLIEQUIVALENT(S): at 01:00

## 2018-05-18 RX ADMIN — OXYCODONE AND ACETAMINOPHEN 1 TABLET(S): 5; 325 TABLET ORAL at 23:29

## 2018-05-18 RX ADMIN — Medication 6 UNIT(S): at 16:32

## 2018-05-18 RX ADMIN — ATORVASTATIN CALCIUM 40 MILLIGRAM(S): 80 TABLET, FILM COATED ORAL at 21:06

## 2018-05-18 RX ADMIN — Medication 6 UNIT(S): at 12:14

## 2018-05-18 RX ADMIN — Medication 100 MILLIGRAM(S): at 23:30

## 2018-05-18 RX ADMIN — ENOXAPARIN SODIUM 40 MILLIGRAM(S): 100 INJECTION SUBCUTANEOUS at 11:11

## 2018-05-18 RX ADMIN — OXYCODONE AND ACETAMINOPHEN 2 TABLET(S): 5; 325 TABLET ORAL at 19:08

## 2018-05-18 RX ADMIN — INSULIN HUMAN 2 UNIT(S)/HR: 100 INJECTION, SOLUTION SUBCUTANEOUS at 11:24

## 2018-05-18 RX ADMIN — HYDROMORPHONE HYDROCHLORIDE 0.5 MILLIGRAM(S): 2 INJECTION INTRAMUSCULAR; INTRAVENOUS; SUBCUTANEOUS at 02:35

## 2018-05-18 RX ADMIN — HYDROMORPHONE HYDROCHLORIDE 0.5 MILLIGRAM(S): 2 INJECTION INTRAMUSCULAR; INTRAVENOUS; SUBCUTANEOUS at 02:50

## 2018-05-18 RX ADMIN — MUPIROCIN 1 APPLICATION(S): 20 OINTMENT TOPICAL at 06:27

## 2018-05-18 RX ADMIN — Medication 25 MILLIGRAM(S): at 23:29

## 2018-05-18 RX ADMIN — HYDROMORPHONE HYDROCHLORIDE 0.5 MILLIGRAM(S): 2 INJECTION INTRAMUSCULAR; INTRAVENOUS; SUBCUTANEOUS at 09:40

## 2018-05-18 RX ADMIN — HYDROMORPHONE HYDROCHLORIDE 0.5 MILLIGRAM(S): 2 INJECTION INTRAMUSCULAR; INTRAVENOUS; SUBCUTANEOUS at 06:30

## 2018-05-18 RX ADMIN — Medication 100 MILLIEQUIVALENT(S): at 00:00

## 2018-05-18 RX ADMIN — Medication 100 MILLIEQUIVALENT(S): at 05:41

## 2018-05-18 RX ADMIN — Medication 250 MILLIGRAM(S): at 07:03

## 2018-05-18 RX ADMIN — MUPIROCIN 1 APPLICATION(S): 20 OINTMENT TOPICAL at 18:08

## 2018-05-18 RX ADMIN — INSULIN GLARGINE 24 UNIT(S): 100 INJECTION, SOLUTION SUBCUTANEOUS at 22:08

## 2018-05-18 RX ADMIN — Medication 2 UNIT(S): at 08:03

## 2018-05-18 NOTE — PROGRESS NOTE ADULT - ASSESSMENT
1. 77 yo male with TVCAD s/p tvcabg. Good preop lv function  2. post op anemia-monitor  3. diabetic  4. brief episode of afib-on amio.

## 2018-05-18 NOTE — PROGRESS NOTE ADULT - PROBLEM SELECTOR PLAN 1
s/p C3L 5/17 s/p C3L 5/17  neurologically intact  hemodynamically stable, evaluate if we can lopressor this am  continue aspirin and statin  extubated yesterday, oxygenating well, C+DBE/IS instructed and encouraged  dysphagia screen passed, clear liquid diet and advance as tolerated  no diuresis at this time, replete electrolytes PRN, celis catheter to remain in place POD #1 for urine output monitoring in critically ill patient  insulin infusion with FS Q1H (titrated per [facility] protocol) for glycemic control   pre-meal to be added this am (Dm pt)  OOB to chair and PT consult this morning  continue with supportive ICU care as needed  above plan of care to be discussed with CT surgical team on am rounds s/p C3L 5/17  neurologically intact  hemodynamically stable, evaluate if we can lopressor this am  Continue with PO amio load for brief episode of afib with aberrancy converted on own   continue aspirin and statin  extubated yesterday, oxygenating well, C+DBE/IS instructed and encouraged  dysphagia screen passed, clear liquid diet and advance as tolerated  no diuresis at this time, replete electrolytes PRN, celis catheter to remain in place POD #1 for urine output monitoring in critically ill patient  insulin infusion with FS Q1H (titrated per [facility] protocol) for glycemic control   pre-meal to be added this am (Dm pt)  OOB to chair and PT consult this morning  continue with supportive ICU care as needed  above plan of care to be discussed with CT surgical team on am rounds

## 2018-05-18 NOTE — DIETITIAN INITIAL EVALUATION ADULT. - OTHER INFO
Pt admitted for chest pain. S/P CABG. PTA pt reports appetite as good. Advanced to CCHO, DASH/TLC diet for lunch this afternoon. No significant wt changes over the past 6 months. Denies N/V/D, swallowing or chewing issues. Food preferences obtained.

## 2018-05-18 NOTE — PROGRESS NOTE ADULT - SUBJECTIVE AND OBJECTIVE BOX
Chief Complaint: recent course and chart reviewed.    HPI: 79 yo male presented with chest pain-cath revealed severe tvcad with preserved EF. SP LIMA to lad and svg's to rca and circ. Pt feels well without anginal pain or dyspnea. Started on amio for brief A-fib.    PAST MEDICAL & SURGICAL HISTORY:  GERD (gastroesophageal reflux disease)  Gout  HLD (hyperlipidemia)  HTN (hypertension)  DM (diabetes mellitus)  No significant past surgical history      PREVIOUS DIAGNOSTIC TESTING:      ECHO  FINDINGS:    STRESS  FINDINGS:    CATHETERIZATION  FINDINGS: see above.    MEDICATIONS  (STANDING):  allopurinol 100 milliGRAM(s) Oral two times a day  amiodarone    Tablet 400 milliGRAM(s) Oral every 8 hours  aspirin enteric coated 325 milliGRAM(s) Oral daily  atorvastatin 40 milliGRAM(s) Oral at bedtime  cefuroxime  IVPB 1500 milliGRAM(s) IV Intermittent every 8 hours  chlorhexidine 0.12% Liquid 5 milliLiter(s) Swish and Spit every 4 hours  docusate sodium 100 milliGRAM(s) Oral three times a day  enoxaparin Injectable 40 milliGRAM(s) SubCutaneous daily  insulin Infusion 2 Unit(s)/Hr (2 mL/Hr) IV Continuous <Continuous>  insulin lispro Injectable (HumaLOG) 2 Unit(s) SubCutaneous three times a day before meals  lactated ringers Bolus 500 milliLiter(s) IV Bolus once  metoprolol tartrate 25 milliGRAM(s) Oral two times a day  mupirocin 2% Ointment 1 Application(s) Topical two times a day  pantoprazole    Tablet 40 milliGRAM(s) Oral daily  potassium chloride  10 mEq/50 mL IVPB 10 milliEquivalent(s) IV Intermittent every 1 hour  potassium chloride  10 mEq/50 mL IVPB 10 milliEquivalent(s) IV Intermittent every 1 hour  potassium chloride  10 mEq/50 mL IVPB 10 milliEquivalent(s) IV Intermittent every 1 hour  senna 2 Tablet(s) Oral at bedtime  sodium chloride 0.9%. 1000 milliLiter(s) (10 mL/Hr) IV Continuous <Continuous>  sodium chloride 0.9%. 1000 milliLiter(s) (5 mL/Hr) IV Continuous <Continuous>  vancomycin  IVPB 1000 milliGRAM(s) IV Intermittent every 12 hours    MEDICATIONS  (PRN):  ondansetron Injectable 4 milliGRAM(s) IV Push every 6 hours PRN Nausea and/or Vomiting      FAMILY HISTORY:  Family history of heart disease (Father, Sibling)      ROS: Negative other than as mentioned in HPI.    Vital Signs Last 24 Hrs  T(C): 37.2 (18 May 2018 07:00), Max: 37.2 (18 May 2018 07:00)  T(F): 99 (18 May 2018 07:00), Max: 99 (18 May 2018 07:00)  HR: 88 (18 May 2018 08:00) (72 - 94)  BP: via A-line 150/60  BP(mean): --  RR: 16 (18 May 2018 08:00) (10 - 24)  SpO2: 97% (18 May 2018 08:00) (94% - 100%)    PHYSICAL EXAM:  General: Appears well developed, well nourished alert and cooperative.Alert afebrile nad.  HEENT: Head; normocephalic, atraumatic.  Eyes;   Pupils reactive, cornea wnl.  Neck; Supple, no nodes adenopathy, no NVD or carotid bruit or thyromegaly.  CARDIOVASCULAR; No murmur, rub, gallop or lift. Normal S1 and S2.  LUNGS; No rales, rhonchi or wheeze. Normal breath sounds bilaterally.  ABDOMEN ; Soft, nontender without mass or organomegaly. bowel sounds normoactive.  EXTREMITIES; No clubbing, cyanosis or edema. right leg wrapped.  SKIN; warm and dry with normal turgor.  NEURO; Alert/oriented x 3/normal motor exam.   PSYCH; normal affect.            INTERPRETATION OF TELEMETRY:    ECG: monitor shows SR    I&O's Detail    17 May 2018 07:01  -  18 May 2018 07:00  --------------------------------------------------------  IN:    Albumin 5%  - 250 mL: 500 mL    insulin Infusion: 50.5 mL    Lactated Ringers IV Bolus: 1000 mL    norepinephrine Infusion: 5 mL    norepinephrine Infusion: 30.8 mL    Packed Red Blood Cells: 342 mL    sodium chloride 0.9%.: 90 mL    sodium chloride 0.9%.: 180 mL    Solution: 300 mL    Solution: 500 mL    Solution: 100 mL  Total IN: 3098.3 mL    OUT:    Chest Tube: 470 mL    Chest Tube: 260 mL    Indwelling Catheter - Urethral: 1920 mL  Total OUT: 2650 mL    Total NET: 448.3 mL      18 May 2018 07:01  -  18 May 2018 08:17  --------------------------------------------------------  IN:    sodium chloride 0.9%.: 5 mL    sodium chloride 0.9%.: 10 mL    Solution: 50 mL  Total IN: 65 mL    OUT:    Chest Tube: 10 mL    Indwelling Catheter - Urethral: 50 mL  Total OUT: 60 mL    Total NET: 5 mL          LABS:                        9.8    9.1   )-----------( 109      ( 18 May 2018 02:56 )             28.5         138  |  103  |  17.0  ----------------------------<  106  3.8   |  23.0  |  0.60    Ca    7.9<L>      18 May 2018 02:56  Mg     1.8         TPro  5.1<L>  /  Alb  3.4  /  TBili  0.6  /  DBili  x   /  AST  30  /  ALT  16  /  AlkPhos  28<L>  -18    CARDIAC MARKERS ( 18 May 2018 02:56 )  x     / 0.42 ng/mL / 349 U/L / x     / 19.7 ng/mL  CARDIAC MARKERS ( 17 May 2018 13:33 )  x     / 0.67 ng/mL / 278 U/L / x     / 28.8 ng/mL      PT/INR - ( 17 May 2018 13:33 )   PT: 19.6 sec;   INR: 1.76 ratio         PTT - ( 17 May 2018 13:33 )  PTT:34.3 sec  Urinalysis Basic - ( 16 May 2018 21:43 )    Color: Yellow / Appearance: Clear / S.015 / pH: x  Gluc: x / Ketone: Negative  / Bili: Negative / Urobili: 1 mg/dL   Blood: x / Protein: 30 mg/dL / Nitrite: Positive   Leuk Esterase: Negative / RBC: 0-2 /HPF / WBC 0-2   Sq Epi: x / Non Sq Epi: Few / Bacteria: x      I&O's Summary    17 May 2018 07:  -  18 May 2018 07:00  --------------------------------------------------------  IN: 3098.3 mL / OUT: 2650 mL / NET: 448.3 mL    18 May 2018 07:  -  18 May 2018 08:17  --------------------------------------------------------  IN: 65 mL / OUT: 60 mL / NET: 5 mL        RADIOLOGY & ADDITIONAL STUDIES:

## 2018-05-18 NOTE — PROGRESS NOTE ADULT - SUBJECTIVE AND OBJECTIVE BOX
POD: 1  s/p: C3L (Lima to LAD, SVG to PDA and OM)  Subjective: 78M s/p extubation presents sitting up in bed in NAD, denies SOB, CP, palpitations, dizziness, n/v/d.     Significant recent/past 24 hr events: Pt arrived from OR yesterday at 12:45 pm at this time pt on levo and insulin drip, pt weaned off Levo with stable hemodynamics. pt extubated at around 10pm without issues.     Patient is a 78y old  Male who presents with a chief complaint of Chest Pain (16 May 2018 15:07)      PAST MEDICAL & SURGICAL HISTORY:  GERD (gastroesophageal reflux disease)  Gout  HLD (hyperlipidemia)  HTN (hypertension)  DM (diabetes mellitus)  No significant past surgical history    FAMILY HISTORY:  Family history of heart disease (Father, Sibling)      Vitals   ICU Vital Signs Last 24 Hrs  T(C): 37.1 (18 May 2018 02:00), Max: 37.1 (17 May 2018 23:00)  T(F): 98.8 (18 May 2018 02:00), Max: 98.8 (17 May 2018 23:00)  HR: 84 (18 May 2018 02:00) (72 - 89)  BP: 132/64 (17 May 2018 06:00) (132/64 - 132/64)  BP(mean): --  ABP: 134/54 (18 May 2018 02:00) (80/39 - 139/66)  ABP(mean): 78 (18 May 2018 02:00) (52 - 90)  RR: 17 (18 May 2018 02:00) (10 - 24)  SpO2: 97% (18 May 2018 02:00) (95% - 100%)      VENT SETTINGS   Mode: CPAP with PS  FiO2: 40  PEEP: 5  PS: 5  MAP: 7    ABG - ( 17 May 2018 22:54 )  pH, Arterial: 7.50  pH, Blood: x     /  pCO2: 28    /  pO2: 91    / HCO3: 24    / Base Excess: -0.2  /  SaO2: 98              I&O's Detail    16 May 2018 07:01  -  17 May 2018 07:00  --------------------------------------------------------  IN:    sodium chloride 0.9%: 600 mL  Total IN: 600 mL    OUT:    Voided: 360 mL  Total OUT: 360 mL    Total NET: 240 mL      17 May 2018 07:01  -  18 May 2018 02:29  --------------------------------------------------------  IN:    Albumin 5%  - 250 mL: 500 mL    insulin Infusion: 43 mL    Lactated Ringers IV Bolus: 1000 mL    norepinephrine Infusion: 5 mL    norepinephrine Infusion: 30.8 mL    Packed Red Blood Cells: 342 mL    sodium chloride 0.9%.: 140 mL    sodium chloride 0.9%.: 70 mL    Solution: 200 mL    Solution: 250 mL    Solution: 100 mL  Total IN: 2680.8 mL    OUT:    Chest Tube: 250 mL    Chest Tube: 350 mL    Indwelling Catheter - Urethral: 1625 mL  Total OUT: 2225 mL    Total NET: 455.8 mL          LABS                        9.4    9.0   )-----------( 91       ( 17 May 2018 13:33 )             26.8     05-    141  |  108<H>  |  14.0  ----------------------------<  154<H>  4.3   |  22.0  |  0.50    Ca    8.5<L>      17 May 2018 13:33  Mg     2.3         TPro  4.4<L>  /  Alb  2.9<L>  /  TBili  0.8  /  DBili  x   /  AST  33  /  ALT  15  /  AlkPhos  28<L>  -17    LIVER FUNCTIONS - ( 17 May 2018 13:33 )  Alb: 2.9 g/dL / Pro: 4.4 g/dL / ALK PHOS: 28 U/L / ALT: 15 U/L / AST: 33 U/L / GGT: x           PT/INR - ( 17 May 2018 13:33 )   PT: 19.6 sec;   INR: 1.76 ratio         PTT - ( 17 May 2018 13:33 )  PTT:34.3 sec  CARDIAC MARKERS ( 17 May 2018 13:33 )   U/L / CKMB28.8 ng/mL / Troponin T0.67 ng/mL /        Urinalysis Basic - ( 16 May 2018 21:43 )    Color: Yellow / Appearance: Clear / S.015 / pH: x  Gluc: x / Ketone: Negative  / Bili: Negative / Urobili: 1 mg/dL   Blood: x / Protein: 30 mg/dL / Nitrite: Positive   Leuk Esterase: Negative / RBC: 0-2 /HPF / WBC 0-2   Sq Epi: x / Non Sq Epi: Few / Bacteria: x      POCT Blood Glucose.: 108 mg/dL (18 @ 00:52)  POCT Blood Glucose.: 121 mg/dL (18 @ 23:05)  POCT Blood Glucose.: 122 mg/dL (18 @ 21:16)  POCT Blood Glucose.: 120 mg/dL (18 @ 18:53)  POCT Blood Glucose.: 144 mg/dL (18 @ 18:14)  POCT Blood Glucose.: 140 mg/dL (18 @ 16:57)  POCT Blood Glucose.: 141 mg/dL (18 @ 16:04)  POCT Blood Glucose.: 142 mg/dL (18 @ 14:57)  POCT Blood Glucose.: 216 mg/dL (18 @ 06:21)    CXR: pending official review    MEDICATIONS  (STANDING):  allopurinol 100 milliGRAM(s) Oral two times a day  amiodarone    Tablet 400 milliGRAM(s) Oral every 8 hours  aspirin enteric coated 325 milliGRAM(s) Oral daily  atorvastatin 40 milliGRAM(s) Oral at bedtime  cefuroxime  IVPB 1500 milliGRAM(s) IV Intermittent every 8 hours  chlorhexidine 0.12% Liquid 5 milliLiter(s) Swish and Spit every 4 hours  docusate sodium 100 milliGRAM(s) Oral three times a day  insulin Infusion 2 Unit(s)/Hr (2 mL/Hr) IV Continuous <Continuous>  lactated ringers Bolus 500 milliLiter(s) IV Bolus once  norepinephrine Infusion 0.05 MICROgram(s)/kG/Min (7.313 mL/Hr) IV Continuous <Continuous>  pantoprazole    Tablet 40 milliGRAM(s) Oral daily  potassium chloride  10 mEq/50 mL IVPB 10 milliEquivalent(s) IV Intermittent every 1 hour  potassium chloride  10 mEq/50 mL IVPB 10 milliEquivalent(s) IV Intermittent every 1 hour  potassium chloride  10 mEq/50 mL IVPB 10 milliEquivalent(s) IV Intermittent every 1 hour  potassium chloride  10 mEq/50 mL IVPB 10 milliEquivalent(s) IV Intermittent every 1 hour  senna 2 Tablet(s) Oral at bedtime  sodium chloride 0.9%. 1000 milliLiter(s) (10 mL/Hr) IV Continuous <Continuous>  sodium chloride 0.9%. 1000 milliLiter(s) (5 mL/Hr) IV Continuous <Continuous>  vancomycin  IVPB 1000 milliGRAM(s) IV Intermittent every 12 hours    MEDICATIONS  (PRN):  fentaNYL    Injectable 50 MICROGram(s) IV Push every 30 minutes PRN Moderate Pain (4 - 6)  ondansetron Injectable 4 milliGRAM(s) IV Push every 6 hours PRN Nausea and/or Vomiting      Allergies:  No Known Allergies        Physical Exam:   Constitutional: NAD, well-groomed, well-developed  Neck: No bruits; No JVD  Respiratory: Breath Sounds equal bilaterally to auscultation with some minimal rhonchi, no accessory muscle use noted  Cardiovascular: Regular rate, regular rhythm, no murmurs or rub  Gastrointestinal: Soft, non-tender, non distended, normal bowel sounds  Extremities: no peripheral edema, no cyanosis, no clubbing   Vascular: Equal and normal pulses: 2+ peripheral pulses throughout  Neurological: speech clear and intact; no sensory, motor  deficits  Skin: warm, dry, well perfused, no rashes  Mediastinal and Left chest tubes in place.   Median sternotomy with Mediplex in place c/d/i      >>> <<<          Case including assessment/plan of care discussed with  CT-ICU attending. POD: 1  s/p: C3L (Lima to LAD, SVG to PDA and OM)  Subjective: 78M s/p extubation presents sitting up in bed in NAD, denies SOB, CP, palpitations, dizziness, n/v/d.     Significant recent/past 24 hr events: Pt arrived from OR yesterday at 12:45 pm at this time pt on levo and insulin drip, pt weaned off Levo with stable hemodynamics. pt extubated at around 10pm without issue.      Patient is a 78y old  Male who presents with a chief complaint of Chest Pain (16 May 2018 15:07)      PAST MEDICAL & SURGICAL HISTORY:  GERD (gastroesophageal reflux disease)  Gout  HLD (hyperlipidemia)  HTN (hypertension)  DM (diabetes mellitus)  No significant past surgical history    FAMILY HISTORY:  Family history of heart disease (Father, Sibling)      Vitals   ICU Vital Signs Last 24 Hrs  T(C): 37.1 (18 May 2018 02:00), Max: 37.1 (17 May 2018 23:00)  T(F): 98.8 (18 May 2018 02:00), Max: 98.8 (17 May 2018 23:00)  HR: 84 (18 May 2018 02:00) (72 - 89)  BP: 132/64 (17 May 2018 06:00) (132/64 - 132/64)  BP(mean): --  ABP: 134/54 (18 May 2018 02:00) (80/39 - 139/66)  ABP(mean): 78 (18 May 2018 02:00) (52 - 90)  RR: 17 (18 May 2018 02:00) (10 - 24)  SpO2: 97% (18 May 2018 02:00) (95% - 100%)      VENT SETTINGS   Mode: CPAP with PS  FiO2: 40  PEEP: 5  PS: 5  MAP: 7    ABG - ( 17 May 2018 22:54 )  pH, Arterial: 7.50  pH, Blood: x     /  pCO2: 28    /  pO2: 91    / HCO3: 24    / Base Excess: -0.2  /  SaO2: 98              I&O's Detail    16 May 2018 07:01  -  17 May 2018 07:00  --------------------------------------------------------  IN:    sodium chloride 0.9%: 600 mL  Total IN: 600 mL    OUT:    Voided: 360 mL  Total OUT: 360 mL    Total NET: 240 mL      17 May 2018 07:01  -  18 May 2018 02:29  --------------------------------------------------------  IN:    Albumin 5%  - 250 mL: 500 mL    insulin Infusion: 43 mL    Lactated Ringers IV Bolus: 1000 mL    norepinephrine Infusion: 5 mL    norepinephrine Infusion: 30.8 mL    Packed Red Blood Cells: 342 mL    sodium chloride 0.9%.: 140 mL    sodium chloride 0.9%.: 70 mL    Solution: 200 mL    Solution: 250 mL    Solution: 100 mL  Total IN: 2680.8 mL    OUT:    Chest Tube: 250 mL    Chest Tube: 350 mL    Indwelling Catheter - Urethral: 1625 mL  Total OUT: 2225 mL    Total NET: 455.8 mL          LABS                        9.4    9.0   )-----------( 91       ( 17 May 2018 13:33 )             26.8     05-    141  |  108<H>  |  14.0  ----------------------------<  154<H>  4.3   |  22.0  |  0.50    Ca    8.5<L>      17 May 2018 13:33  Mg     2.3         TPro  4.4<L>  /  Alb  2.9<L>  /  TBili  0.8  /  DBili  x   /  AST  33  /  ALT  15  /  AlkPhos  28<L>  -17    LIVER FUNCTIONS - ( 17 May 2018 13:33 )  Alb: 2.9 g/dL / Pro: 4.4 g/dL / ALK PHOS: 28 U/L / ALT: 15 U/L / AST: 33 U/L / GGT: x           PT/INR - ( 17 May 2018 13:33 )   PT: 19.6 sec;   INR: 1.76 ratio         PTT - ( 17 May 2018 13:33 )  PTT:34.3 sec  CARDIAC MARKERS ( 17 May 2018 13:33 )   U/L / CKMB28.8 ng/mL / Troponin T0.67 ng/mL /        Urinalysis Basic - ( 16 May 2018 21:43 )    Color: Yellow / Appearance: Clear / S.015 / pH: x  Gluc: x / Ketone: Negative  / Bili: Negative / Urobili: 1 mg/dL   Blood: x / Protein: 30 mg/dL / Nitrite: Positive   Leuk Esterase: Negative / RBC: 0-2 /HPF / WBC 0-2   Sq Epi: x / Non Sq Epi: Few / Bacteria: x      POCT Blood Glucose.: 108 mg/dL (18 @ 00:52)  POCT Blood Glucose.: 121 mg/dL (18 @ 23:05)  POCT Blood Glucose.: 122 mg/dL (18 @ 21:16)  POCT Blood Glucose.: 120 mg/dL (18 @ 18:53)  POCT Blood Glucose.: 144 mg/dL (18 @ 18:14)  POCT Blood Glucose.: 140 mg/dL (18 @ 16:57)  POCT Blood Glucose.: 141 mg/dL (18 @ 16:04)  POCT Blood Glucose.: 142 mg/dL (18 @ 14:57)  POCT Blood Glucose.: 216 mg/dL (18 @ 06:21)    CXR: pending official review    MEDICATIONS  (STANDING):  allopurinol 100 milliGRAM(s) Oral two times a day  amiodarone    Tablet 400 milliGRAM(s) Oral every 8 hours  aspirin enteric coated 325 milliGRAM(s) Oral daily  atorvastatin 40 milliGRAM(s) Oral at bedtime  cefuroxime  IVPB 1500 milliGRAM(s) IV Intermittent every 8 hours  chlorhexidine 0.12% Liquid 5 milliLiter(s) Swish and Spit every 4 hours  docusate sodium 100 milliGRAM(s) Oral three times a day  insulin Infusion 2 Unit(s)/Hr (2 mL/Hr) IV Continuous <Continuous>  lactated ringers Bolus 500 milliLiter(s) IV Bolus once  norepinephrine Infusion 0.05 MICROgram(s)/kG/Min (7.313 mL/Hr) IV Continuous <Continuous>  pantoprazole    Tablet 40 milliGRAM(s) Oral daily  potassium chloride  10 mEq/50 mL IVPB 10 milliEquivalent(s) IV Intermittent every 1 hour  potassium chloride  10 mEq/50 mL IVPB 10 milliEquivalent(s) IV Intermittent every 1 hour  potassium chloride  10 mEq/50 mL IVPB 10 milliEquivalent(s) IV Intermittent every 1 hour  potassium chloride  10 mEq/50 mL IVPB 10 milliEquivalent(s) IV Intermittent every 1 hour  senna 2 Tablet(s) Oral at bedtime  sodium chloride 0.9%. 1000 milliLiter(s) (10 mL/Hr) IV Continuous <Continuous>  sodium chloride 0.9%. 1000 milliLiter(s) (5 mL/Hr) IV Continuous <Continuous>  vancomycin  IVPB 1000 milliGRAM(s) IV Intermittent every 12 hours    MEDICATIONS  (PRN):  fentaNYL    Injectable 50 MICROGram(s) IV Push every 30 minutes PRN Moderate Pain (4 - 6)  ondansetron Injectable 4 milliGRAM(s) IV Push every 6 hours PRN Nausea and/or Vomiting      Allergies:  No Known Allergies        Physical Exam:   Constitutional: NAD, well-groomed, well-developed  Neck: No bruits; No JVD  Respiratory: Breath Sounds equal bilaterally to auscultation with some minimal rhonchi, no accessory muscle use noted  Cardiovascular: Regular rate, regular rhythm, no murmurs or rub. +PWs box set to 50/12  Gastrointestinal: Soft, non-tender, non distended, normal bowel sounds  Extremities: no peripheral edema, no cyanosis, no clubbing   Vascular: Equal and normal pulses: 2+ peripheral pulses throughout  Neurological: speech clear and intact; no sensory, motor  deficits  Skin: warm, dry, well perfused, no rashes  Mediastinal and Left chest tubes in place.   Median sternotomy with Mediplex in place c/d/i      >>> <<<          Case including assessment/plan of care discussed with  CT-ICU attending. POD: 1  s/p: C3L (Lima to LAD, SVG to PDA and OM)  Subjective: 78M s/p extubation presents sitting up in bed in NAD, denies SOB, CP, palpitations, dizziness, n/v/d.     Significant recent/past 24 hr events: Pt arrived from OR yesterday at 12:45 pm at this time pt on levo and insulin drip, pt weaned off Levo with stable hemodynamics. Brief episode of A-fib with aberrancy given 150 amio IV and started on amio load po. pt extubated at around 10pm without issue.      Patient is a 78y old  Male who presents with a chief complaint of Chest Pain (16 May 2018 15:07)      PAST MEDICAL & SURGICAL HISTORY:  GERD (gastroesophageal reflux disease)  Gout  HLD (hyperlipidemia)  HTN (hypertension)  DM (diabetes mellitus)  No significant past surgical history    FAMILY HISTORY:  Family history of heart disease (Father, Sibling)      Vitals   ICU Vital Signs Last 24 Hrs  T(C): 37.1 (18 May 2018 02:00), Max: 37.1 (17 May 2018 23:00)  T(F): 98.8 (18 May 2018 02:00), Max: 98.8 (17 May 2018 23:00)  HR: 84 (18 May 2018 02:00) (72 - 89)  BP: 132/64 (17 May 2018 06:00) (132/64 - 132/64)  BP(mean): --  ABP: 134/54 (18 May 2018 02:00) (80/39 - 139/66)  ABP(mean): 78 (18 May 2018 02:00) (52 - 90)  RR: 17 (18 May 2018 02:00) (10 - 24)  SpO2: 97% (18 May 2018 02:00) (95% - 100%)      VENT SETTINGS   Mode: CPAP with PS  FiO2: 40  PEEP: 5  PS: 5  MAP: 7    ABG - ( 17 May 2018 22:54 )  pH, Arterial: 7.50  pH, Blood: x     /  pCO2: 28    /  pO2: 91    / HCO3: 24    / Base Excess: -0.2  /  SaO2: 98              I&O's Detail    16 May 2018 07:01  -  17 May 2018 07:00  --------------------------------------------------------  IN:    sodium chloride 0.9%: 600 mL  Total IN: 600 mL    OUT:    Voided: 360 mL  Total OUT: 360 mL    Total NET: 240 mL      17 May 2018 07:01  -  18 May 2018 02:29  --------------------------------------------------------  IN:    Albumin 5%  - 250 mL: 500 mL    insulin Infusion: 43 mL    Lactated Ringers IV Bolus: 1000 mL    norepinephrine Infusion: 5 mL    norepinephrine Infusion: 30.8 mL    Packed Red Blood Cells: 342 mL    sodium chloride 0.9%.: 140 mL    sodium chloride 0.9%.: 70 mL    Solution: 200 mL    Solution: 250 mL    Solution: 100 mL  Total IN: 2680.8 mL    OUT:    Chest Tube: 250 mL    Chest Tube: 350 mL    Indwelling Catheter - Urethral: 1625 mL  Total OUT: 2225 mL    Total NET: 455.8 mL          LABS                        9.4    9.0   )-----------( 91       ( 17 May 2018 13:33 )             26.8     05-    141  |  108<H>  |  14.0  ----------------------------<  154<H>  4.3   |  22.0  |  0.50    Ca    8.5<L>      17 May 2018 13:33  Mg     2.3         TPro  4.4<L>  /  Alb  2.9<L>  /  TBili  0.8  /  DBili  x   /  AST  33  /  ALT  15  /  AlkPhos  28<L>  -17    LIVER FUNCTIONS - ( 17 May 2018 13:33 )  Alb: 2.9 g/dL / Pro: 4.4 g/dL / ALK PHOS: 28 U/L / ALT: 15 U/L / AST: 33 U/L / GGT: x           PT/INR - ( 17 May 2018 13:33 )   PT: 19.6 sec;   INR: 1.76 ratio         PTT - ( 17 May 2018 13:33 )  PTT:34.3 sec  CARDIAC MARKERS ( 17 May 2018 13:33 )   U/L / CKMB28.8 ng/mL / Troponin T0.67 ng/mL /        Urinalysis Basic - ( 16 May 2018 21:43 )    Color: Yellow / Appearance: Clear / S.015 / pH: x  Gluc: x / Ketone: Negative  / Bili: Negative / Urobili: 1 mg/dL   Blood: x / Protein: 30 mg/dL / Nitrite: Positive   Leuk Esterase: Negative / RBC: 0-2 /HPF / WBC 0-2   Sq Epi: x / Non Sq Epi: Few / Bacteria: x      POCT Blood Glucose.: 108 mg/dL (18 @ 00:52)  POCT Blood Glucose.: 121 mg/dL (18 @ 23:05)  POCT Blood Glucose.: 122 mg/dL (18 @ 21:16)  POCT Blood Glucose.: 120 mg/dL (18 @ 18:53)  POCT Blood Glucose.: 144 mg/dL (18 @ 18:14)  POCT Blood Glucose.: 140 mg/dL (18 @ 16:57)  POCT Blood Glucose.: 141 mg/dL (18 @ 16:04)  POCT Blood Glucose.: 142 mg/dL (18 @ 14:57)  POCT Blood Glucose.: 216 mg/dL (18 @ 06:21)    CXR: pending official review    MEDICATIONS  (STANDING):  allopurinol 100 milliGRAM(s) Oral two times a day  amiodarone    Tablet 400 milliGRAM(s) Oral every 8 hours  aspirin enteric coated 325 milliGRAM(s) Oral daily  atorvastatin 40 milliGRAM(s) Oral at bedtime  cefuroxime  IVPB 1500 milliGRAM(s) IV Intermittent every 8 hours  chlorhexidine 0.12% Liquid 5 milliLiter(s) Swish and Spit every 4 hours  docusate sodium 100 milliGRAM(s) Oral three times a day  insulin Infusion 2 Unit(s)/Hr (2 mL/Hr) IV Continuous <Continuous>  lactated ringers Bolus 500 milliLiter(s) IV Bolus once  norepinephrine Infusion 0.05 MICROgram(s)/kG/Min (7.313 mL/Hr) IV Continuous <Continuous>  pantoprazole    Tablet 40 milliGRAM(s) Oral daily  potassium chloride  10 mEq/50 mL IVPB 10 milliEquivalent(s) IV Intermittent every 1 hour  potassium chloride  10 mEq/50 mL IVPB 10 milliEquivalent(s) IV Intermittent every 1 hour  potassium chloride  10 mEq/50 mL IVPB 10 milliEquivalent(s) IV Intermittent every 1 hour  potassium chloride  10 mEq/50 mL IVPB 10 milliEquivalent(s) IV Intermittent every 1 hour  senna 2 Tablet(s) Oral at bedtime  sodium chloride 0.9%. 1000 milliLiter(s) (10 mL/Hr) IV Continuous <Continuous>  sodium chloride 0.9%. 1000 milliLiter(s) (5 mL/Hr) IV Continuous <Continuous>  vancomycin  IVPB 1000 milliGRAM(s) IV Intermittent every 12 hours    MEDICATIONS  (PRN):  fentaNYL    Injectable 50 MICROGram(s) IV Push every 30 minutes PRN Moderate Pain (4 - 6)  ondansetron Injectable 4 milliGRAM(s) IV Push every 6 hours PRN Nausea and/or Vomiting      Allergies:  No Known Allergies        Physical Exam:   Constitutional: NAD, well-groomed, well-developed  Neck: No bruits; No JVD  Respiratory: Breath Sounds equal bilaterally to auscultation with some minimal rhonchi, no accessory muscle use noted  Cardiovascular: Regular rate, regular rhythm, no murmurs or rub. +PWs box set to 50/12  Gastrointestinal: Soft, non-tender, non distended, normal bowel sounds  Extremities: no peripheral edema, no cyanosis, no clubbing   Vascular: Equal and normal pulses: 2+ peripheral pulses throughout  Neurological: speech clear and intact; no sensory, motor  deficits  Skin: warm, dry, well perfused, no rashes  Mediastinal and Left chest tubes in place.   Median sternotomy with Mediplex in place c/d/i      >>> <<<          Case including assessment/plan of care discussed with  CT-ICU attending.

## 2018-05-18 NOTE — PROGRESS NOTE ADULT - PROBLEM SELECTOR PLAN 5
continue post-op GI, DVT, abx prophylaxis 5/17 1 unit PRBCs immediately post op for hematocrit of 24  Monitor CBC

## 2018-05-18 NOTE — CONSULT NOTE ADULT - ASSESSMENT
78 year old male with longstanding Type 2 DM, HTN, hyperlipidemia admitted with multivessel CAD s/p 3V CABG on 5/17/18.  His glycemic control is suboptimal in the postoperative period.    1.  T2DM-  continue with insulin gtt as per ICU protocol.  Agree with initiation of metformin as renal and hepatic function are normal and no signs of CHF.  Based on his current insulin gtt requirements, he will likely require larger doses of premeal humalog and Lantus.  Would suggest Lantus 30 units qhs and Humalog 10 units with meals + moderate dose scale for correction insulin.    2.  CAD s/p CABG-  as per CT surgery  3.  Hyperlipidemia-  continue statin  4. HTN-  on metoprolol.  Would benefit from adding back ACE-I in future.

## 2018-05-18 NOTE — CONSULT NOTE ADULT - SUBJECTIVE AND OBJECTIVE BOX
HPI:  Patient is a 78 year old male with a history of Type 2 DM, HTN and hyperlipidemia who was referred to [facility] after EKG at urgent care was abnormal.  Patient developed episode of CP after carrying his grandchildren which prompted Urgent Care visit.  Here a cardiac cath showed multivessel disease and he is now s/p 3V CABG on 2018.    Patient was diagnosed with Type 2 DM about 20 years ago.  He admits that his DM was moderately uncontrolled (A1c 8.3% on admission) and was worsened due to his non-compliance with diet in the past.  As an outpatient he takes Metformin 1000 mg BID and Onglyza 5 mg daily and follows with Dr. Shaffer,  He does not monitor his glucose at home.  He denies any known associated microvascular complications.      After surgery he was transitioned to insulin gtt.  His glucose has been difficult to control and currently requiring 3 units per hour in addition to 6 units of prandial insulin.  Metformin was just added back.     PAST MEDICAL & SURGICAL HISTORY:  GERD (gastroesophageal reflux disease)  Gout  HLD (hyperlipidemia)  HTN (hypertension)  DM (diabetes mellitus)  No significant past surgical history    Allergies  No Known Allergies    MEDICATIONS  (STANDING):  allopurinol 100 milliGRAM(s) Oral two times a day  aspirin enteric coated 325 milliGRAM(s) Oral daily  atorvastatin 40 milliGRAM(s) Oral at bedtime  cefuroxime  IVPB 1500 milliGRAM(s) IV Intermittent every 8 hours  docusate sodium 100 milliGRAM(s) Oral three times a day  enoxaparin Injectable 40 milliGRAM(s) SubCutaneous daily  insulin glargine Injectable (LANTUS) 16 Unit(s) SubCutaneous at bedtime  insulin Infusion 2 Unit(s)/Hr (2 mL/Hr) IV Continuous <Continuous>  insulin lispro Injectable (HumaLOG) 6 Unit(s) SubCutaneous before breakfast  insulin lispro Injectable (HumaLOG) 6 Unit(s) SubCutaneous before lunch  insulin lispro Injectable (HumaLOG) 6 Unit(s) SubCutaneous before dinner  lactated ringers Bolus 500 milliLiter(s) IV Bolus once  metFORMIN 1000 milliGRAM(s) Oral two times a day  metoprolol tartrate 25 milliGRAM(s) Oral every 8 hours  mupirocin 2% Ointment 1 Application(s) Topical two times a day  pantoprazole    Tablet 40 milliGRAM(s) Oral daily  senna 2 Tablet(s) Oral at bedtime  sodium chloride 0.9%. 1000 milliLiter(s) (10 mL/Hr) IV Continuous <Continuous>  sodium chloride 0.9%. 1000 milliLiter(s) (5 mL/Hr) IV Continuous <Continuous>  vancomycin  IVPB 1000 milliGRAM(s) IV Intermittent every 12 hours    SH:  retired, no tobacco or EtoH    FH:  sister had T1DM ()    ROS:  as above, otherwise 10 point ROS negative.     ICU Vital Signs Last 24 Hrs  T(C): 37.1 (18 May 2018 15:00), Max: 37.2 (18 May 2018 07:00)  T(F): 98.7 (18 May 2018 15:00), Max: 99 (18 May 2018 07:00)  HR: 86 (18 May 2018 19:) (78 - 96)  ABP: 143/59 (18 May 2018 19:00) (100/47 - 153/58)  ABP(mean): 87 (18 May 2018 19:00) (66 - 93)  RR: 22 (18 May 2018 15:00) (10 - 26)  SpO2: 98% (18 May 2018 19:00) (94% - 100%)      PE:  Gen: AAO, NAD  HEENT:  sclera anicteric, MMM  Neck:  no thyromegaly, no LAD  CV:  nl S1 + S2, RRR, no m/r/g  Resp:  nl respiratory effort, CTA b/l  GI/ Abd: soft, NT/ ND, BS +  Neuro:  No tremor, sensation intact to light touch on b/l feet  MS:  no c/c/e, nl muscle tone  Skin:  no foot ulcers, no rashes    LABS:      138  |  103  |  17.0  ----------------------------<  106  3.8   |  23.0  |  0.60    Ca    7.9<L>      18 May 2018 02:56  Mg     1.8         TPro  5.1<L>  /  Alb  3.4  /  TBili  0.6  /  DBili  x   /  AST  30  /  ALT  16  /  AlkPhos  28<L>                            9.8    9.1   )-----------( 109      ( 18 May 2018 02:56 )             28.5     Hemoglobin A1C, Whole Blood: 8.3 % (18 @ 23:21)    Thyroid Stimulating Hormone, Serum: 3.49 uIU/mL (18 @ 23:21)    CAPILLARY BLOOD GLUCOSE  POCT Blood Glucose.: 227 mg/dL (18 May 2018 18:25)  POCT Blood Glucose.: 225 mg/dL (18 May 2018 17:35)  POCT Blood Glucose.: 247 mg/dL (18 May 2018 16:28)  POCT Blood Glucose.: 283 mg/dL (18 May 2018 15:04)  POCT Blood Glucose.: 319 mg/dL (18 May 2018 13:57)  POCT Blood Glucose.: 366 mg/dL (18 May 2018 13:10)  POCT Blood Glucose.: 320 mg/dL (18 May 2018 12:07)  POCT Blood Glucose.: 297 mg/dL (18 May 2018 11:04)  POCT Blood Glucose.: 289 mg/dL (18 May 2018 09:59)  POCT Blood Glucose.: 154 mg/dL (18 May 2018 08:01)  POCT Blood Glucose.: 139 mg/dL (18 May 2018 06:25)  POCT Blood Glucose.: 107 mg/dL (18 May 2018 02:58)  POCT Blood Glucose.: 108 mg/dL (18 May 2018 00:52)  POCT Blood Glucose.: 121 mg/dL (17 May 2018 23:05)  POCT Blood Glucose.: 122 mg/dL (17 May 2018 21:16)

## 2018-05-18 NOTE — PROGRESS NOTE ADULT - ASSESSMENT
79 y/o M with PMHx of DM, HTN, HLD, GERD and gout presented to ED with 2xdays c/o intermittent chest pain radiating to his back. Pt underwent 2 recent stress test with a subsequent positive nuclear stress test. In th ED pt had neg. 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   5/17 s/p C3L (Lima-LAD, SVG-PDA/OM). Pt extubated without issues. 79 y/o M with PMHx of DM, HTN, HLD, GERD and gout presented to ED with 2xdays c/o intermittent chest pain radiating to his back. Pt underwent 2 recent stress test with a subsequent positive nuclear stress test. In th ED pt had neg. 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   5/17 s/p C3L (Lima-LAD, SVG-PDA/OM). Pt extubated without issue. 79 y/o M with PMHx of DM, HTN, HLD, GERD and gout presented to ED with 2xdays c/o intermittent chest pain radiating to his back. Pt underwent 2 recent stress test with a subsequent positive nuclear stress test. In th ED pt had neg. 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   5/17 s/p C3L (Lima-LAD, SVG-PDA/OM). Immediately post op pt received 1 unit of PRBCs for hematocrit of 24.  Pt extubated without issue. 79 y/o M with PMHx of DM, HTN, HLD, GERD and gout presented to ED with 2xdays c/o intermittent chest pain radiating to his back. Pt underwent 2 recent stress test with a subsequent positive nuclear stress test. In th ED pt had neg. 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   5/17 s/p C3L (Lima-LAD, SVG-PDA/OM). Immediately post op pt received 1 unit of PRBCs for hematocrit of 24. Brief moment of a-fib with aberrancy with conversion to NSR, amio iv 150 given and PO load started.  Pt extubated without issue.

## 2018-05-19 DIAGNOSIS — E11.65 TYPE 2 DIABETES MELLITUS WITH HYPERGLYCEMIA: ICD-10-CM

## 2018-05-19 DIAGNOSIS — M1A.9XX0 CHRONIC GOUT, UNSPECIFIED, WITHOUT TOPHUS (TOPHI): ICD-10-CM

## 2018-05-19 LAB
ANION GAP SERPL CALC-SCNC: 15 MMOL/L — SIGNIFICANT CHANGE UP (ref 5–17)
BUN SERPL-MCNC: 24 MG/DL — HIGH (ref 8–20)
CALCIUM SERPL-MCNC: 8.1 MG/DL — LOW (ref 8.6–10.2)
CHLORIDE SERPL-SCNC: 103 MMOL/L — SIGNIFICANT CHANGE UP (ref 98–107)
CO2 SERPL-SCNC: 20 MMOL/L — LOW (ref 22–29)
CREAT SERPL-MCNC: 0.71 MG/DL — SIGNIFICANT CHANGE UP (ref 0.5–1.3)
GLUCOSE BLDC GLUCOMTR-MCNC: 117 MG/DL — HIGH (ref 70–99)
GLUCOSE BLDC GLUCOMTR-MCNC: 181 MG/DL — HIGH (ref 70–99)
GLUCOSE BLDC GLUCOMTR-MCNC: 193 MG/DL — HIGH (ref 70–99)
GLUCOSE BLDC GLUCOMTR-MCNC: 206 MG/DL — HIGH (ref 70–99)
GLUCOSE BLDC GLUCOMTR-MCNC: 254 MG/DL — HIGH (ref 70–99)
GLUCOSE BLDC GLUCOMTR-MCNC: 283 MG/DL — HIGH (ref 70–99)
GLUCOSE SERPL-MCNC: 171 MG/DL — HIGH (ref 70–115)
HCT VFR BLD CALC: 29.5 % — LOW (ref 42–52)
HGB BLD-MCNC: 10 G/DL — LOW (ref 14–18)
MAGNESIUM SERPL-MCNC: 1.9 MG/DL — SIGNIFICANT CHANGE UP (ref 1.6–2.6)
MCHC RBC-ENTMCNC: 28.9 PG — SIGNIFICANT CHANGE UP (ref 27–31)
MCHC RBC-ENTMCNC: 33.9 G/DL — SIGNIFICANT CHANGE UP (ref 32–36)
MCV RBC AUTO: 85.3 FL — SIGNIFICANT CHANGE UP (ref 80–94)
PLATELET # BLD AUTO: 166 K/UL — SIGNIFICANT CHANGE UP (ref 150–400)
POTASSIUM SERPL-MCNC: 4.2 MMOL/L — SIGNIFICANT CHANGE UP (ref 3.5–5.3)
POTASSIUM SERPL-SCNC: 4.2 MMOL/L — SIGNIFICANT CHANGE UP (ref 3.5–5.3)
RBC # BLD: 3.46 M/UL — LOW (ref 4.6–6.2)
RBC # FLD: 14.3 % — SIGNIFICANT CHANGE UP (ref 11–15.6)
SODIUM SERPL-SCNC: 138 MMOL/L — SIGNIFICANT CHANGE UP (ref 135–145)
WBC # BLD: 14 K/UL — HIGH (ref 4.8–10.8)
WBC # FLD AUTO: 14 K/UL — HIGH (ref 4.8–10.8)

## 2018-05-19 PROCEDURE — 71045 X-RAY EXAM CHEST 1 VIEW: CPT | Mod: 26

## 2018-05-19 PROCEDURE — 93010 ELECTROCARDIOGRAM REPORT: CPT

## 2018-05-19 RX ORDER — MUPIROCIN 20 MG/G
1 OINTMENT TOPICAL
Qty: 0 | Refills: 0 | Status: COMPLETED | OUTPATIENT
Start: 2018-05-19 | End: 2018-05-22

## 2018-05-19 RX ORDER — METOPROLOL TARTRATE 50 MG
5 TABLET ORAL ONCE
Qty: 0 | Refills: 0 | Status: COMPLETED | OUTPATIENT
Start: 2018-05-19 | End: 2018-05-19

## 2018-05-19 RX ORDER — POTASSIUM CHLORIDE 20 MEQ
20 PACKET (EA) ORAL DAILY
Qty: 0 | Refills: 0 | Status: DISCONTINUED | OUTPATIENT
Start: 2018-05-19 | End: 2018-05-22

## 2018-05-19 RX ORDER — POTASSIUM CHLORIDE 20 MEQ
40 PACKET (EA) ORAL ONCE
Qty: 0 | Refills: 0 | Status: COMPLETED | OUTPATIENT
Start: 2018-05-19 | End: 2018-05-19

## 2018-05-19 RX ORDER — FUROSEMIDE 40 MG
40 TABLET ORAL DAILY
Qty: 0 | Refills: 0 | Status: DISCONTINUED | OUTPATIENT
Start: 2018-05-19 | End: 2018-05-22

## 2018-05-19 RX ORDER — MAGNESIUM SULFATE 500 MG/ML
2 VIAL (ML) INJECTION ONCE
Qty: 0 | Refills: 0 | Status: COMPLETED | OUTPATIENT
Start: 2018-05-19 | End: 2018-05-19

## 2018-05-19 RX ORDER — AMIODARONE HYDROCHLORIDE 400 MG/1
400 TABLET ORAL EVERY 8 HOURS
Qty: 0 | Refills: 0 | Status: DISCONTINUED | OUTPATIENT
Start: 2018-05-19 | End: 2018-05-22

## 2018-05-19 RX ORDER — SORBITOL SOLUTION 70 %
30 SOLUTION, ORAL MISCELLANEOUS ONCE
Qty: 0 | Refills: 0 | Status: COMPLETED | OUTPATIENT
Start: 2018-05-19 | End: 2018-05-19

## 2018-05-19 RX ORDER — INSULIN LISPRO 100/ML
VIAL (ML) SUBCUTANEOUS
Qty: 0 | Refills: 0 | Status: DISCONTINUED | OUTPATIENT
Start: 2018-05-19 | End: 2018-05-22

## 2018-05-19 RX ORDER — SODIUM CHLORIDE 9 MG/ML
3 INJECTION INTRAMUSCULAR; INTRAVENOUS; SUBCUTANEOUS EVERY 8 HOURS
Qty: 0 | Refills: 0 | Status: DISCONTINUED | OUTPATIENT
Start: 2018-05-19 | End: 2018-05-22

## 2018-05-19 RX ORDER — AMIODARONE HYDROCHLORIDE 400 MG/1
200 TABLET ORAL DAILY
Qty: 0 | Refills: 0 | Status: CANCELLED | OUTPATIENT
Start: 2018-05-24 | End: 2018-05-22

## 2018-05-19 RX ORDER — ATENOLOL 25 MG/1
50 TABLET ORAL
Qty: 0 | Refills: 0 | Status: DISCONTINUED | OUTPATIENT
Start: 2018-05-19 | End: 2018-05-20

## 2018-05-19 RX ADMIN — Medication 10 UNIT(S): at 08:15

## 2018-05-19 RX ADMIN — Medication 4: at 23:34

## 2018-05-19 RX ADMIN — Medication 100 MILLIGRAM(S): at 14:28

## 2018-05-19 RX ADMIN — Medication 100 MILLIGRAM(S): at 06:09

## 2018-05-19 RX ADMIN — Medication 100 MILLIGRAM(S): at 17:11

## 2018-05-19 RX ADMIN — METFORMIN HYDROCHLORIDE 1000 MILLIGRAM(S): 850 TABLET ORAL at 06:09

## 2018-05-19 RX ADMIN — SODIUM CHLORIDE 3 MILLILITER(S): 9 INJECTION INTRAMUSCULAR; INTRAVENOUS; SUBCUTANEOUS at 11:26

## 2018-05-19 RX ADMIN — Medication 10 UNIT(S): at 12:27

## 2018-05-19 RX ADMIN — INSULIN GLARGINE 30 UNIT(S): 100 INJECTION, SOLUTION SUBCUTANEOUS at 23:33

## 2018-05-19 RX ADMIN — Medication 40 MILLIGRAM(S): at 06:08

## 2018-05-19 RX ADMIN — Medication 30 MILLILITER(S): at 16:57

## 2018-05-19 RX ADMIN — PANTOPRAZOLE SODIUM 40 MILLIGRAM(S): 20 TABLET, DELAYED RELEASE ORAL at 11:27

## 2018-05-19 RX ADMIN — ATORVASTATIN CALCIUM 40 MILLIGRAM(S): 80 TABLET, FILM COATED ORAL at 22:26

## 2018-05-19 RX ADMIN — METFORMIN HYDROCHLORIDE 1000 MILLIGRAM(S): 850 TABLET ORAL at 17:11

## 2018-05-19 RX ADMIN — MUPIROCIN 1 APPLICATION(S): 20 OINTMENT TOPICAL at 06:09

## 2018-05-19 RX ADMIN — MUPIROCIN 1 APPLICATION(S): 20 OINTMENT TOPICAL at 17:13

## 2018-05-19 RX ADMIN — Medication 25 MILLIGRAM(S): at 08:48

## 2018-05-19 RX ADMIN — Medication 6: at 12:27

## 2018-05-19 RX ADMIN — Medication 25 MILLIGRAM(S): at 17:11

## 2018-05-19 RX ADMIN — AMIODARONE HYDROCHLORIDE 400 MILLIGRAM(S): 400 TABLET ORAL at 18:32

## 2018-05-19 RX ADMIN — Medication 50 GRAM(S): at 20:29

## 2018-05-19 RX ADMIN — Medication 50 GRAM(S): at 04:08

## 2018-05-19 RX ADMIN — Medication 325 MILLIGRAM(S): at 11:27

## 2018-05-19 RX ADMIN — Medication 40 MILLIEQUIVALENT(S): at 20:29

## 2018-05-19 RX ADMIN — ATENOLOL 50 MILLIGRAM(S): 25 TABLET ORAL at 20:45

## 2018-05-19 RX ADMIN — Medication 6: at 17:11

## 2018-05-19 RX ADMIN — AMIODARONE HYDROCHLORIDE 400 MILLIGRAM(S): 400 TABLET ORAL at 22:26

## 2018-05-19 RX ADMIN — Medication 2: at 08:15

## 2018-05-19 RX ADMIN — Medication 20 MILLIEQUIVALENT(S): at 11:27

## 2018-05-19 RX ADMIN — Medication 10 UNIT(S): at 17:12

## 2018-05-19 RX ADMIN — SODIUM CHLORIDE 3 MILLILITER(S): 9 INJECTION INTRAMUSCULAR; INTRAVENOUS; SUBCUTANEOUS at 22:23

## 2018-05-19 RX ADMIN — SODIUM CHLORIDE 3 MILLILITER(S): 9 INJECTION INTRAMUSCULAR; INTRAVENOUS; SUBCUTANEOUS at 05:13

## 2018-05-19 RX ADMIN — Medication 250 MILLIGRAM(S): at 08:15

## 2018-05-19 RX ADMIN — OXYCODONE AND ACETAMINOPHEN 1 TABLET(S): 5; 325 TABLET ORAL at 00:20

## 2018-05-19 RX ADMIN — Medication 100 MILLIGRAM(S): at 07:17

## 2018-05-19 NOTE — PROGRESS NOTE ADULT - SUBJECTIVE AND OBJECTIVE BOX
Overnight events:  Lantus increased and Insulin gtt turned off     Past Medical History:  Chest pain  GERD (gastroesophageal reflux disease)  Gout  HLD (hyperlipidemia)  HTN (hypertension)  Coronary artery disease involving native coronary artery of native heart with angina pectoris  Chest pain with moderate risk for cardiac etiology  Anemia due to blood loss  CABG  Type 2 diabetes mellitus with hyperglycemia, without long-term current use of insulin  Thrombocytopenia    MEDICATIONS  (PRN):  allopurinol 100 milliGRAM(s) Oral two times a day  aspirin enteric coated 325 milliGRAM(s) Oral daily  atorvastatin 40 milliGRAM(s) Oral at bedtime  cefuroxime  IVPB 1500 milliGRAM(s) IV Intermittent every 8 hours  docusate sodium 100 milliGRAM(s) Oral three times a day  enoxaparin Injectable 40 milliGRAM(s) SubCutaneous daily  furosemide    Tablet 40 milliGRAM(s) Oral daily  insulin glargine Injectable (LANTUS) 30 Unit(s) SubCutaneous at bedtime  insulin lispro Injectable (HumaLOG) 10 Unit(s) SubCutaneous three times a day before meals  lactated ringers Bolus 500 milliLiter(s) IV Bolus once  metFORMIN 1000 milliGRAM(s) Oral two times a day  metoprolol tartrate 25 milliGRAM(s) Oral every 8 hours  mupirocin 2% Ointment 1 Application(s) Topical two times a day  pantoprazole    Tablet 40 milliGRAM(s) Oral daily  potassium chloride   Powder 20 milliEquivalent(s) Oral daily  senna 2 Tablet(s) Oral at bedtime  vancomycin  IVPB 1000 milliGRAM(s) IV Intermittent every 12 hours    MEDICATIONS  (PRN):  ondansetron Injectable 4 milliGRAM(s) IV Push every 6 hours PRN Nausea and/or Vomiting  oxyCODONE    5 mG/acetaminophen 325 mG 1 Tablet(s) Oral every 6 hours PRN Moderate Pain (4 - 6)  oxyCODONE    5 mG/acetaminophen 325 mG 2 Tablet(s) Oral every 6 hours PRN Severe Pain (7 - 10)  polyethylene glycol 3350 17 Gram(s) Oral daily PRN Constipation      Daily     Daily Weight in k.4 (18 May 2018 10:46)      ABG - ( 17 May 2018 22:54 )  pH, Arterial: 7.50  pH, Blood: x     /  pCO2: 28    /  pO2: 91    / HCO3: 24    / Base Excess: -0.2  /  SaO2: 98                            9.8    9.1   )-----------( 109      ( 18 May 2018 02:56 )             28.5       138  |  103  |  17.0  ----------------------------<  106  3.8   |  23.0  |  0.60    Ca    7.9<L>      18 May 2018 02:56  Mg     1.8         TPro  5.1<L>  /  Alb  3.4  /  TBili  0.6  /  DBili  x   /  AST  30  /  ALT  16  /  AlkPhos  28<L>      CARDIAC MARKERS ( 18 May 2018 02:56 )  x     / 0.42 ng/mL / 349 U/L / x     / 19.7 ng/mL  CARDIAC MARKERS ( 17 May 2018 13:33 )  x     / 0.67 ng/mL / 278 U/L / x     / 28.8 ng/mL    PT/INR - ( 17 May 2018 13:33 )   PT: 19.6 sec;   INR: 1.76 ratio         PTT - ( 17 May 2018 13:33 )  PTT:34.3 sec      Objective:  T(C): 37.2 (18 @ 22:00), Max: 37.2 (18 @ 07:00)  HR: 88 (18 @ 00:00) (81 - 97)  BP: --  RR: 20 (18 @ 00:00) (14 - 26)  SpO2: 96% (18 @ 00:00) (94% - 98%)  Wt(kg): --CAPILLARY BLOOD GLUCOSE      POCT Blood Glucose.: 117 mg/dL (19 May 2018 00:09)  POCT Blood Glucose.: 129 mg/dL (18 May 2018 23:19)  POCT Blood Glucose.: 149 mg/dL (18 May 2018 22:07)  POCT Blood Glucose.: 158 mg/dL (18 May 2018 21:19)  POCT Blood Glucose.: 227 mg/dL (18 May 2018 18:25)  POCT Blood Glucose.: 225 mg/dL (18 May 2018 17:35)  POCT Blood Glucose.: 247 mg/dL (18 May 2018 16:28)  POCT Blood Glucose.: 283 mg/dL (18 May 2018 15:04)  POCT Blood Glucose.: 319 mg/dL (18 May 2018 13:57)  POCT Blood Glucose.: 366 mg/dL (18 May 2018 13:10)  POCT Blood Glucose.: 320 mg/dL (18 May 2018 12:07)  POCT Blood Glucose.: 297 mg/dL (18 May 2018 11:04)  POCT Blood Glucose.: 289 mg/dL (18 May 2018 09:59)  POCT Blood Glucose.: 154 mg/dL (18 May 2018 08:01)  POCT Blood Glucose.: 139 mg/dL (18 May 2018 06:25)  POCT Blood Glucose.: 107 mg/dL (18 May 2018 02:58)  I&O's Summary    17 May 2018 07:  -  18 May 2018 07:00  --------------------------------------------------------  IN: 3098.3 mL / OUT: 2650 mL / NET: 448.3 mL    18 May 2018 07:01  -  19 May 2018 01:20  --------------------------------------------------------  IN: 1513 mL / OUT: 1370 mL / NET: 143 mL      Physical Exam  Neuro: A+O x 3, non-focal, speech clear and intact  Pulm: CTA, equal bilaterally  CV: RRR, no murmurs, +S1S2  Abd: soft, NT, ND, +BS  Ext: +DP Pulses b/l, +PT pulses, no edema  Inc: MSI C/D/I/stable w/ dsg, right C/D/I w/ dsg/Ace wrap

## 2018-05-19 NOTE — PROGRESS NOTE ADULT - PROBLEM SELECTOR PLAN 1
s/p C3L 5/17  neurologically intact  hemodynamically stable, evaluate if we can lopressor this am  Continue with PO amio load for brief episode of afib with aberrancy converted on own   continue aspirin and statin  extubated yesterday, oxygenating well, C+DBE/IS instructed and encouraged  dysphagia screen passed, clear liquid diet and advance as tolerated  no diuresis at this time, replete electrolytes PRN, celis catheter to remain in place POD #1 for urine output monitoring in critically ill patient  insulin infusion with FS Q1H (titrated per [facility] protocol) for glycemic control   pre-meal to be added this am (Dm pt)  OOB to chair and PT consult this morning  continue with supportive ICU care as needed  above plan of care to be discussed with CT surgical team on am rounds s/p C3L 5/17  neurologically intact  hemodynamically stable, lopressor added   continue aspirin and statin  C+DBE/IS instructed and encouraged  tolerating PO intake  no diuresis at this time, replete electrolytes PRN  voiding  Lantus + Humalog + DURGA AHCS  OOB to chair and PT consult   continue with supportive ICU care as needed  above plan of care to be discussed with CT surgical team on am rounds

## 2018-05-19 NOTE — PROGRESS NOTE ADULT - ASSESSMENT
1. 77 yo male with TVCAD s/p tvcabg. Good preop lv function  2. post op anemia-monitor  3. diabetic  4. brief episode of afib-on amio.    Transfer to telemetry  cont present cardiac therapy  CT surgery FUP

## 2018-05-19 NOTE — PROGRESS NOTE ADULT - SUBJECTIVE AND OBJECTIVE BOX
Chief Complaint: recent course and chart reviewed.    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 SP LIMA to lad and svg's to rca and circ. Pt feels well without anginal pain or dyspnea. Started on amio for brief A-fib. In NSR    PAST MEDICAL & SURGICAL HISTORY:  GERD (gastroesophageal reflux disease)  Gout  HLD (hyperlipidemia)  HTN (hypertension)  DM (diabetes mellitus)  No significant past surgical history      PREVIOUS DIAGNOSTIC TESTING:      ECHO  FINDINGS:    STRESS  FINDINGS:    CATHETERIZATION  FINDINGS: see above.    MEDICATIONS  (STANDING):  allopurinol 100 milliGRAM(s) Oral two times a day  amiodarone    Tablet 400 milliGRAM(s) Oral every 8 hours  aspirin enteric coated 325 milliGRAM(s) Oral daily  atorvastatin 40 milliGRAM(s) Oral at bedtime  cefuroxime  IVPB 1500 milliGRAM(s) IV Intermittent every 8 hours  chlorhexidine 0.12% Liquid 5 milliLiter(s) Swish and Spit every 4 hours  docusate sodium 100 milliGRAM(s) Oral three times a day  enoxaparin Injectable 40 milliGRAM(s) SubCutaneous daily  insulin Infusion 2 Unit(s)/Hr (2 mL/Hr) IV Continuous <Continuous>  insulin lispro Injectable (HumaLOG) 2 Unit(s) SubCutaneous three times a day before meals  lactated ringers Bolus 500 milliLiter(s) IV Bolus once  metoprolol tartrate 25 milliGRAM(s) Oral two times a day  mupirocin 2% Ointment 1 Application(s) Topical two times a day  pantoprazole    Tablet 40 milliGRAM(s) Oral daily  potassium chloride  10 mEq/50 mL IVPB 10 milliEquivalent(s) IV Intermittent every 1 hour  potassium chloride  10 mEq/50 mL IVPB 10 milliEquivalent(s) IV Intermittent every 1 hour  potassium chloride  10 mEq/50 mL IVPB 10 milliEquivalent(s) IV Intermittent every 1 hour  senna 2 Tablet(s) Oral at bedtime  sodium chloride 0.9%. 1000 milliLiter(s) (10 mL/Hr) IV Continuous <Continuous>  sodium chloride 0.9%. 1000 milliLiter(s) (5 mL/Hr) IV Continuous <Continuous>  vancomycin  IVPB 1000 milliGRAM(s) IV Intermittent every 12 hours    MEDICATIONS  (PRN):  ondansetron Injectable 4 milliGRAM(s) IV Push every 6 hours PRN Nausea and/or Vomiting      FAMILY HISTORY:  Family history of heart disease (Father, Sibling)      ROS: Negative other than as mentioned in HPI.    Vital Signs Last 24 Hrs  T(C): 37.2 (18 May 2018 07:00), Max: 37.2 (18 May 2018 07:00)  T(F): 99 (18 May 2018 07:00), Max: 99 (18 May 2018 07:00)  HR: 88 (18 May 2018 08:00) (72 - 94)  BP: via A-line 150/60  BP(mean): --  RR: 16 (18 May 2018 08:00) (10 - 24)  SpO2: 97% (18 May 2018 08:00) (94% - 100%)    PHYSICAL EXAM:  General: Appears well developed, well nourished alert and cooperative.Alert afebrile nad.  HEENT: Head; normocephalic, atraumatic.  Eyes;   Pupils reactive, cornea wnl.  Neck; Supple, no nodes adenopathy, no NVD or carotid bruit or thyromegaly.  CARDIOVASCULAR; No murmur, rub, gallop or lift. Normal S1 and S2.  LUNGS; No rales, rhonchi or wheeze. Normal breath sounds bilaterally.  ABDOMEN ; Soft, nontender without mass or organomegaly. bowel sounds normoactive.  EXTREMITIES; No clubbing, cyanosis or edema. right leg wrapped.  SKIN; warm and dry with normal turgor.  NEURO; Alert/oriented x 3/normal motor exam.   PSYCH; normal affect.            INTERPRETATION OF TELEMETRY:    ECG: monitor shows SR    I&O's Detail    17 May 2018 07:01  -  18 May 2018 07:00  --------------------------------------------------------  IN:    Albumin 5%  - 250 mL: 500 mL    insulin Infusion: 50.5 mL    Lactated Ringers IV Bolus: 1000 mL    norepinephrine Infusion: 5 mL    norepinephrine Infusion: 30.8 mL    Packed Red Blood Cells: 342 mL    sodium chloride 0.9%.: 90 mL    sodium chloride 0.9%.: 180 mL    Solution: 300 mL    Solution: 500 mL    Solution: 100 mL  Total IN: 3098.3 mL    OUT:    Chest Tube: 470 mL    Chest Tube: 260 mL    Indwelling Catheter - Urethral: 1920 mL  Total OUT: 2650 mL    Total NET: 448.3 mL      18 May 2018 07:01  -  18 May 2018 08:17  --------------------------------------------------------  IN:    sodium chloride 0.9%.: 5 mL    sodium chloride 0.9%.: 10 mL    Solution: 50 mL  Total IN: 65 mL    OUT:    Chest Tube: 10 mL    Indwelling Catheter - Urethral: 50 mL  Total OUT: 60 mL    Total NET: 5 mL          LABS:                        9.8    9.1   )-----------( 109      ( 18 May 2018 02:56 )             28.5     05-18    138  |  103  |  17.0  ----------------------------<  106  3.8   |  23.0  |  0.60    Ca    7.9<L>      18 May 2018 02:56  Mg     1.8         TPro  5.1<L>  /  Alb  3.4  /  TBili  0.6  /  DBili  x   /  AST  30  /  ALT  16  /  AlkPhos  28<L>  -18    CARDIAC MARKERS ( 18 May 2018 02:56 )  x     / 0.42 ng/mL / 349 U/L / x     / 19.7 ng/mL  CARDIAC MARKERS ( 17 May 2018 13:33 )  x     / 0.67 ng/mL / 278 U/L / x     / 28.8 ng/mL      PT/INR - ( 17 May 2018 13:33 )   PT: 19.6 sec;   INR: 1.76 ratio         PTT - ( 17 May 2018 13:33 )  PTT:34.3 sec  Urinalysis Basic - ( 16 May 2018 21:43 )    Color: Yellow / Appearance: Clear / S.015 / pH: x  Gluc: x / Ketone: Negative  / Bili: Negative / Urobili: 1 mg/dL   Blood: x / Protein: 30 mg/dL / Nitrite: Positive   Leuk Esterase: Negative / RBC: 0-2 /HPF / WBC 0-2   Sq Epi: x / Non Sq Epi: Few / Bacteria: x      I&O's Summary    17 May 2018 07:  -  18 May 2018 07:00  --------------------------------------------------------  IN: 3098.3 mL / OUT: 2650 mL / NET: 448.3 mL    18 May 2018 07:  -  18 May 2018 08:17  --------------------------------------------------------  IN: 65 mL / OUT: 60 mL / NET: 5 mL        RADIOLOGY & ADDITIONAL STUDIES:

## 2018-05-19 NOTE — PROGRESS NOTE ADULT - ASSESSMENT
77 y/o M with PMHx of DM, HTN, HLD, GERD and gout presented to ED with 2xdays c/o intermittent chest pain radiating to his back. Pt underwent 2 recent stress test with a subsequent positive nuclear stress test. In th ED pt had neg. 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   5/17 s/p C3L (Lima-LAD, SVG-PDA/OM). Immediately post op pt received 1 unit of PRBCs for hematocrit of 24. Brief moment of a-fib with aberrancy with conversion to NSR, amio iv 150 given and PO load started.  Pt extubated without issue.  5/18 Amiodarone d/c'd, Lopressor started 79 y/o M with PMHx of DM, HTN, HLD, GERD and gout presented to ED with 2xdays c/o intermittent chest pain radiating to his back. Pt underwent 2 recent stress test with a subsequent positive nuclear stress test. In th ED pt had neg. 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   5/17 s/p C3L (Lima-LAD, SVG-PDA/OM). Immediately post op pt received 1 unit of PRBCs for hematocrit of 24. Brief moment of a-fib with aberrancy with conversion to NSR, amio iv 150 given and PO load started.  Pt extubated without issue.  5/18 Amiodarone d/c'd, Lopressor started, hyperglycemic Endo consulted- Lantus 30u qhs and Humalog 10u added

## 2018-05-20 DIAGNOSIS — I48.0 PAROXYSMAL ATRIAL FIBRILLATION: ICD-10-CM

## 2018-05-20 DIAGNOSIS — I95.9 HYPOTENSION, UNSPECIFIED: ICD-10-CM

## 2018-05-20 LAB
ANION GAP SERPL CALC-SCNC: 14 MMOL/L — SIGNIFICANT CHANGE UP (ref 5–17)
BLD GP AB SCN SERPL QL: SIGNIFICANT CHANGE UP
BUN SERPL-MCNC: 30 MG/DL — HIGH (ref 8–20)
CALCIUM SERPL-MCNC: 7.9 MG/DL — LOW (ref 8.6–10.2)
CHLORIDE SERPL-SCNC: 100 MMOL/L — SIGNIFICANT CHANGE UP (ref 98–107)
CO2 SERPL-SCNC: 21 MMOL/L — LOW (ref 22–29)
CREAT SERPL-MCNC: 0.87 MG/DL — SIGNIFICANT CHANGE UP (ref 0.5–1.3)
GLUCOSE BLDC GLUCOMTR-MCNC: 148 MG/DL — HIGH (ref 70–99)
GLUCOSE BLDC GLUCOMTR-MCNC: 152 MG/DL — HIGH (ref 70–99)
GLUCOSE BLDC GLUCOMTR-MCNC: 247 MG/DL — HIGH (ref 70–99)
GLUCOSE BLDC GLUCOMTR-MCNC: 258 MG/DL — HIGH (ref 70–99)
GLUCOSE SERPL-MCNC: 210 MG/DL — HIGH (ref 70–115)
HCT VFR BLD CALC: 27.2 % — LOW (ref 42–52)
HGB BLD-MCNC: 9 G/DL — LOW (ref 14–18)
MAGNESIUM SERPL-MCNC: 2.3 MG/DL — SIGNIFICANT CHANGE UP (ref 1.6–2.6)
MCHC RBC-ENTMCNC: 28.6 PG — SIGNIFICANT CHANGE UP (ref 27–31)
MCHC RBC-ENTMCNC: 33.1 G/DL — SIGNIFICANT CHANGE UP (ref 32–36)
MCV RBC AUTO: 86.3 FL — SIGNIFICANT CHANGE UP (ref 80–94)
PHOSPHATE SERPL-MCNC: 1.9 MG/DL — LOW (ref 2.4–4.7)
PLATELET # BLD AUTO: 153 K/UL — SIGNIFICANT CHANGE UP (ref 150–400)
POTASSIUM SERPL-MCNC: 4.7 MMOL/L — SIGNIFICANT CHANGE UP (ref 3.5–5.3)
POTASSIUM SERPL-SCNC: 4.7 MMOL/L — SIGNIFICANT CHANGE UP (ref 3.5–5.3)
RBC # BLD: 3.15 M/UL — LOW (ref 4.6–6.2)
RBC # FLD: 14.4 % — SIGNIFICANT CHANGE UP (ref 11–15.6)
SODIUM SERPL-SCNC: 135 MMOL/L — SIGNIFICANT CHANGE UP (ref 135–145)
TYPE + AB SCN PNL BLD: SIGNIFICANT CHANGE UP
WBC # BLD: 10.4 K/UL — SIGNIFICANT CHANGE UP (ref 4.8–10.8)
WBC # FLD AUTO: 10.4 K/UL — SIGNIFICANT CHANGE UP (ref 4.8–10.8)

## 2018-05-20 PROCEDURE — 99232 SBSQ HOSP IP/OBS MODERATE 35: CPT | Mod: 24

## 2018-05-20 PROCEDURE — 71045 X-RAY EXAM CHEST 1 VIEW: CPT | Mod: 26

## 2018-05-20 PROCEDURE — 93010 ELECTROCARDIOGRAM REPORT: CPT

## 2018-05-20 RX ORDER — INSULIN LISPRO 100/ML
12 VIAL (ML) SUBCUTANEOUS
Qty: 0 | Refills: 0 | Status: DISCONTINUED | OUTPATIENT
Start: 2018-05-20 | End: 2018-05-22

## 2018-05-20 RX ORDER — ALBUMIN HUMAN 25 %
250 VIAL (ML) INTRAVENOUS ONCE
Qty: 0 | Refills: 0 | Status: COMPLETED | OUTPATIENT
Start: 2018-05-20 | End: 2018-05-20

## 2018-05-20 RX ORDER — INSULIN GLARGINE 100 [IU]/ML
35 INJECTION, SOLUTION SUBCUTANEOUS AT BEDTIME
Qty: 0 | Refills: 0 | Status: DISCONTINUED | OUTPATIENT
Start: 2018-05-20 | End: 2018-05-22

## 2018-05-20 RX ORDER — ATENOLOL 25 MG/1
25 TABLET ORAL
Qty: 0 | Refills: 0 | Status: DISCONTINUED | OUTPATIENT
Start: 2018-05-20 | End: 2018-05-22

## 2018-05-20 RX ORDER — ACETAMINOPHEN 500 MG
1000 TABLET ORAL ONCE
Qty: 0 | Refills: 0 | Status: COMPLETED | OUTPATIENT
Start: 2018-05-20 | End: 2018-05-20

## 2018-05-20 RX ADMIN — Medication 100 MILLIGRAM(S): at 14:12

## 2018-05-20 RX ADMIN — AMIODARONE HYDROCHLORIDE 400 MILLIGRAM(S): 400 TABLET ORAL at 21:40

## 2018-05-20 RX ADMIN — Medication 400 MILLIGRAM(S): at 12:44

## 2018-05-20 RX ADMIN — Medication 2: at 17:30

## 2018-05-20 RX ADMIN — Medication 125 MILLILITER(S): at 06:08

## 2018-05-20 RX ADMIN — SODIUM CHLORIDE 3 MILLILITER(S): 9 INJECTION INTRAMUSCULAR; INTRAVENOUS; SUBCUTANEOUS at 12:40

## 2018-05-20 RX ADMIN — Medication 12 UNIT(S): at 17:30

## 2018-05-20 RX ADMIN — Medication 4: at 12:45

## 2018-05-20 RX ADMIN — MUPIROCIN 1 APPLICATION(S): 20 OINTMENT TOPICAL at 06:15

## 2018-05-20 RX ADMIN — PANTOPRAZOLE SODIUM 40 MILLIGRAM(S): 20 TABLET, DELAYED RELEASE ORAL at 12:46

## 2018-05-20 RX ADMIN — Medication 20 MILLIEQUIVALENT(S): at 12:46

## 2018-05-20 RX ADMIN — Medication 325 MILLIGRAM(S): at 12:46

## 2018-05-20 RX ADMIN — ENOXAPARIN SODIUM 40 MILLIGRAM(S): 100 INJECTION SUBCUTANEOUS at 01:38

## 2018-05-20 RX ADMIN — Medication 1000 MILLIGRAM(S): at 13:15

## 2018-05-20 RX ADMIN — Medication 62.5 MILLIMOLE(S): at 07:38

## 2018-05-20 RX ADMIN — Medication 10 UNIT(S): at 08:43

## 2018-05-20 RX ADMIN — MUPIROCIN 1 APPLICATION(S): 20 OINTMENT TOPICAL at 16:25

## 2018-05-20 RX ADMIN — SODIUM CHLORIDE 3 MILLILITER(S): 9 INJECTION INTRAMUSCULAR; INTRAVENOUS; SUBCUTANEOUS at 06:15

## 2018-05-20 RX ADMIN — METFORMIN HYDROCHLORIDE 1000 MILLIGRAM(S): 850 TABLET ORAL at 06:12

## 2018-05-20 RX ADMIN — AMIODARONE HYDROCHLORIDE 400 MILLIGRAM(S): 400 TABLET ORAL at 14:12

## 2018-05-20 RX ADMIN — Medication 100 MILLIGRAM(S): at 06:13

## 2018-05-20 RX ADMIN — Medication 5 MILLIGRAM(S): at 07:17

## 2018-05-20 RX ADMIN — METFORMIN HYDROCHLORIDE 1000 MILLIGRAM(S): 850 TABLET ORAL at 16:23

## 2018-05-20 RX ADMIN — Medication 12 UNIT(S): at 12:45

## 2018-05-20 RX ADMIN — Medication 100 MILLIGRAM(S): at 16:23

## 2018-05-20 RX ADMIN — ATORVASTATIN CALCIUM 40 MILLIGRAM(S): 80 TABLET, FILM COATED ORAL at 21:40

## 2018-05-20 RX ADMIN — ENOXAPARIN SODIUM 40 MILLIGRAM(S): 100 INJECTION SUBCUTANEOUS at 21:41

## 2018-05-20 RX ADMIN — Medication 6: at 08:43

## 2018-05-20 RX ADMIN — SODIUM CHLORIDE 3 MILLILITER(S): 9 INJECTION INTRAMUSCULAR; INTRAVENOUS; SUBCUTANEOUS at 21:39

## 2018-05-20 RX ADMIN — INSULIN GLARGINE 35 UNIT(S): 100 INJECTION, SOLUTION SUBCUTANEOUS at 22:42

## 2018-05-20 NOTE — PROGRESS NOTE ADULT - ASSESSMENT
1. 77 yo male with TVCAD s/p tvcabg. Good preop lv function  2. post op anemia-monitor  3. diabetic  4. brief episode of afib-on amio.    telemetry  cont present cardiac therapy  PRBC transfusion today  CT surgery FUP

## 2018-05-20 NOTE — PROGRESS NOTE ADULT - ASSESSMENT
77 y/o M with PMHx of DM, HTN, HLD, GERD and gout presented to ED with 2xdays c/o intermittent chest pain radiating to his back. Pt underwent 2 recent stress test with a subsequent positive nuclear stress test. In th ED pt had neg. 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       5/17 s/p C3L (Lima-LAD, SVG-PDA/OM). Immediately post op pt received 1 unit of PRBCs for hematocrit of 24. Brief moment of a-fib with aberrancy with conversion to NSR, amio iv 150 given and PO load started.  Pt extubated without issue.  5/18 Amiodarone d/c'd, Lopressor started, hyperglycemic Endo consulted- Lantus 30u qhs and Humalog 10u added.  5/19 Rapid Afib 120-150> IV cardizem andIV lopressor given, and PO Amio load started.  Conversion to SR overnight.   5/20 Asymptomatic hypotension in AM.  Albumin x 1 given.  Atenolol decreased.  HCT 27.  PRBC x 1.

## 2018-05-20 NOTE — PROGRESS NOTE ADULT - PROBLEM SELECTOR PLAN 1
s/p C3L 5/17  Continue Aspirin, Atenolol, and Lipitor.  Encourage CDBE/IS and increased mobilization.  Physical therapy.  Maintain PW while on amiodarone load.  Daily diuretics for post op volume overload.

## 2018-05-20 NOTE — PROGRESS NOTE ADULT - ASSESSMENT
78 year old male with longstanding Type 2 DM, HTN, hyperlipidemia admitted with multivessel CAD s/p 3V CABG on 5/17/18.       1.  T2DM-  Agree with increase in prandial insulin and Lantus.  Continue Metformin.   Given current insulin requirements and A1c, will need to discharge on insulin.  2.  CAD s/p CABG-  as per CT surgery  3.  Hyperlipidemia-  continue statin  4. HTN-   controlled on beta blocker.

## 2018-05-20 NOTE — PROGRESS NOTE ADULT - SUBJECTIVE AND OBJECTIVE BOX
CC: follow up DM    Interval HPI:  Insulin increased today as BG was still over 200 mg/dl this AM.  Denies any CP or SOB.    MEDICATIONS  (STANDING):  allopurinol 100 milliGRAM(s) Oral two times a day  amiodarone    Tablet 400 milliGRAM(s) Oral every 8 hours  aspirin enteric coated 325 milliGRAM(s) Oral daily  ATENolol  Tablet 25 milliGRAM(s) Oral two times a day  atorvastatin 40 milliGRAM(s) Oral at bedtime  docusate sodium 100 milliGRAM(s) Oral three times a day  enoxaparin Injectable 40 milliGRAM(s) SubCutaneous daily  furosemide    Tablet 40 milliGRAM(s) Oral daily  insulin glargine Injectable (LANTUS) 35 Unit(s) SubCutaneous at bedtime  insulin lispro (HumaLOG) corrective regimen sliding scale   SubCutaneous Before meals and at bedtime  insulin lispro Injectable (HumaLOG) 12 Unit(s) SubCutaneous three times a day before meals  metFORMIN 1000 milliGRAM(s) Oral two times a day  mupirocin 2% Ointment 1 Application(s) Topical two times a day  pantoprazole    Tablet 40 milliGRAM(s) Oral daily  potassium chloride   Powder 20 milliEquivalent(s) Oral daily  senna 2 Tablet(s) Oral at bedtime  sodium chloride 0.9% lock flush 3 milliLiter(s) IV Push every 8 hours    Vital Signs Last 24 Hrs  T(C): 37.2 (20 May 2018 05:42), Max: 37.7 (19 May 2018 20:24)  T(F): 99 (20 May 2018 05:42), Max: 99.9 (19 May 2018 20:24)  HR: 63 (20 May 2018 16:22) (60 - 154)  BP: 108/62 (20 May 2018 16:22) (80/48 - 108/74)  RR: 18 (20 May 2018 16:22) (16 - 19)  SpO2: 95% (20 May 2018 16:22) (95% - 99%)    PE:  Gen: AAO, NAD  HEENT:  sclera anicteric, MMM  Neck:  no thyromegaly, no LAD  CV:  nl S1 + S2, RRR, no m/r/g  Resp:  nl respiratory effort, CTA b/l  GI/ Abd: soft, NT/ ND, BS +  Psych:  affect appropriate    LABS:  05-20    135  |  100  |  30.0<H>  ----------------------------<  210<H>  4.7   |  21.0<L>  |  0.87    Ca    7.9<L>      20 May 2018 05:39  Phos  1.9     05-20  Mg     2.3     05-20                          9.0    10.4  )-----------( 153      ( 20 May 2018 05:40 )             27.2     CAPILLARY BLOOD GLUCOSE  POCT Blood Glucose.: 152 mg/dL (20 May 2018 17:03)  POCT Blood Glucose.: 247 mg/dL (20 May 2018 12:32)  POCT Blood Glucose.: 258 mg/dL (20 May 2018 08:14)  POCT Blood Glucose.: 206 mg/dL (19 May 2018 22:41)

## 2018-05-20 NOTE — PROGRESS NOTE ADULT - SUBJECTIVE AND OBJECTIVE BOX
Chief Complaint: recent course and chart reviewed.    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 SP LIMA to lad and svg's to rca and circ.   Pt feels well without anginal pain or dyspnea. Started on amio for brief A-fib. In NSR    Had another episode of Af back in NSR    PAST MEDICAL & SURGICAL HISTORY:  GERD (gastroesophageal reflux disease)  Gout  HLD (hyperlipidemia)  HTN (hypertension)  DM (diabetes mellitus)  No significant past surgical history      PREVIOUS DIAGNOSTIC TESTING:      ECHO  FINDINGS:    STRESS  FINDINGS:    CATHETERIZATION  FINDINGS: see above.    MEDICATIONS  (STANDING):  allopurinol 100 milliGRAM(s) Oral two times a day  amiodarone    Tablet 400 milliGRAM(s) Oral every 8 hours  aspirin enteric coated 325 milliGRAM(s) Oral daily  atorvastatin 40 milliGRAM(s) Oral at bedtime  cefuroxime  IVPB 1500 milliGRAM(s) IV Intermittent every 8 hours  chlorhexidine 0.12% Liquid 5 milliLiter(s) Swish and Spit every 4 hours  docusate sodium 100 milliGRAM(s) Oral three times a day  enoxaparin Injectable 40 milliGRAM(s) SubCutaneous daily  insulin Infusion 2 Unit(s)/Hr (2 mL/Hr) IV Continuous <Continuous>  insulin lispro Injectable (HumaLOG) 2 Unit(s) SubCutaneous three times a day before meals  lactated ringers Bolus 500 milliLiter(s) IV Bolus once  metoprolol tartrate 25 milliGRAM(s) Oral two times a day  mupirocin 2% Ointment 1 Application(s) Topical two times a day  pantoprazole    Tablet 40 milliGRAM(s) Oral daily  potassium chloride  10 mEq/50 mL IVPB 10 milliEquivalent(s) IV Intermittent every 1 hour  potassium chloride  10 mEq/50 mL IVPB 10 milliEquivalent(s) IV Intermittent every 1 hour  potassium chloride  10 mEq/50 mL IVPB 10 milliEquivalent(s) IV Intermittent every 1 hour  senna 2 Tablet(s) Oral at bedtime  sodium chloride 0.9%. 1000 milliLiter(s) (10 mL/Hr) IV Continuous <Continuous>  sodium chloride 0.9%. 1000 milliLiter(s) (5 mL/Hr) IV Continuous <Continuous>  vancomycin  IVPB 1000 milliGRAM(s) IV Intermittent every 12 hours    MEDICATIONS  (PRN):  ondansetron Injectable 4 milliGRAM(s) IV Push every 6 hours PRN Nausea and/or Vomiting      FAMILY HISTORY:  Family history of heart disease (Father, Sibling)      ROS: Negative other than as mentioned in HPI.    Vital Signs Last 24 Hrs  T(C): 37.2 (18 May 2018 07:00), Max: 37.2 (18 May 2018 07:00)  T(F): 99 (18 May 2018 07:00), Max: 99 (18 May 2018 07:00)  HR: 88 (18 May 2018 08:00) (72 - 94)  BP: via A-line 150/60  BP(mean): --  RR: 16 (18 May 2018 08:00) (10 - 24)  SpO2: 97% (18 May 2018 08:00) (94% - 100%)    PHYSICAL EXAM:  General: Appears well developed, well nourished alert and cooperative.Alert afebrile nad.  HEENT: Head; normocephalic, atraumatic.  Eyes;   Pupils reactive, cornea wnl.  Neck; Supple, no nodes adenopathy, no NVD or carotid bruit or thyromegaly.  CARDIOVASCULAR; No murmur, rub, gallop or lift. Normal S1 and S2.  LUNGS; No rales, rhonchi or wheeze. Normal breath sounds bilaterally.  ABDOMEN ; Soft, nontender without mass or organomegaly. bowel sounds normoactive.  EXTREMITIES; No clubbing, cyanosis or edema. right leg wrapped.  SKIN; warm and dry with normal turgor.  NEURO; Alert/oriented x 3/normal motor exam.   PSYCH; normal affect.            INTERPRETATION OF TELEMETRY:    ECG: monitor shows SR    I&O's Detail    17 May 2018 07:01  -  18 May 2018 07:00  --------------------------------------------------------  IN:    Albumin 5%  - 250 mL: 500 mL    insulin Infusion: 50.5 mL    Lactated Ringers IV Bolus: 1000 mL    norepinephrine Infusion: 5 mL    norepinephrine Infusion: 30.8 mL    Packed Red Blood Cells: 342 mL    sodium chloride 0.9%.: 90 mL    sodium chloride 0.9%.: 180 mL    Solution: 300 mL    Solution: 500 mL    Solution: 100 mL  Total IN: 3098.3 mL    OUT:    Chest Tube: 470 mL    Chest Tube: 260 mL    Indwelling Catheter - Urethral: 1920 mL  Total OUT: 2650 mL    Total NET: 448.3 mL      18 May 2018 07:01  -  18 May 2018 08:17  --------------------------------------------------------  IN:    sodium chloride 0.9%.: 5 mL    sodium chloride 0.9%.: 10 mL    Solution: 50 mL  Total IN: 65 mL    OUT:    Chest Tube: 10 mL    Indwelling Catheter - Urethral: 50 mL  Total OUT: 60 mL    Total NET: 5 mL          LABS:                        9.8    9.1   )-----------( 109      ( 18 May 2018 02:56 )             28.5     05-18    138  |  103  |  17.0  ----------------------------<  106  3.8   |  23.0  |  0.60    Ca    7.9<L>      18 May 2018 02:56  Mg     1.8         TPro  5.1<L>  /  Alb  3.4  /  TBili  0.6  /  DBili  x   /  AST  30  /  ALT  16  /  AlkPhos  28<L>  -18    CARDIAC MARKERS ( 18 May 2018 02:56 )  x     / 0.42 ng/mL / 349 U/L / x     / 19.7 ng/mL  CARDIAC MARKERS ( 17 May 2018 13:33 )  x     / 0.67 ng/mL / 278 U/L / x     / 28.8 ng/mL      PT/INR - ( 17 May 2018 13:33 )   PT: 19.6 sec;   INR: 1.76 ratio         PTT - ( 17 May 2018 13:33 )  PTT:34.3 sec  Urinalysis Basic - ( 16 May 2018 21:43 )    Color: Yellow / Appearance: Clear / S.015 / pH: x  Gluc: x / Ketone: Negative  / Bili: Negative / Urobili: 1 mg/dL   Blood: x / Protein: 30 mg/dL / Nitrite: Positive   Leuk Esterase: Negative / RBC: 0-2 /HPF / WBC 0-2   Sq Epi: x / Non Sq Epi: Few / Bacteria: x      I&O's Summary    17 May 2018 07:  -  18 May 2018 07:00  --------------------------------------------------------  IN: 3098.3 mL / OUT: 2650 mL / NET: 448.3 mL    18 May 2018 07:  -  18 May 2018 08:17  --------------------------------------------------------  IN: 65 mL / OUT: 60 mL / NET: 5 mL        RADIOLOGY & ADDITIONAL STUDIES:

## 2018-05-21 LAB
ALBUMIN SERPL ELPH-MCNC: 3.2 G/DL — LOW (ref 3.3–5.2)
ALP SERPL-CCNC: 193 U/L — HIGH (ref 40–120)
ALT FLD-CCNC: 56 U/L — HIGH
ANION GAP SERPL CALC-SCNC: 15 MMOL/L — SIGNIFICANT CHANGE UP (ref 5–17)
AST SERPL-CCNC: 30 U/L — SIGNIFICANT CHANGE UP
BILIRUB SERPL-MCNC: 0.7 MG/DL — SIGNIFICANT CHANGE UP (ref 0.4–2)
BUN SERPL-MCNC: 30 MG/DL — HIGH (ref 8–20)
CALCIUM SERPL-MCNC: 8.7 MG/DL — SIGNIFICANT CHANGE UP (ref 8.6–10.2)
CHLORIDE SERPL-SCNC: 102 MMOL/L — SIGNIFICANT CHANGE UP (ref 98–107)
CO2 SERPL-SCNC: 21 MMOL/L — LOW (ref 22–29)
CREAT SERPL-MCNC: 0.86 MG/DL — SIGNIFICANT CHANGE UP (ref 0.5–1.3)
GLUCOSE BLDC GLUCOMTR-MCNC: 144 MG/DL — HIGH (ref 70–99)
GLUCOSE BLDC GLUCOMTR-MCNC: 151 MG/DL — HIGH (ref 70–99)
GLUCOSE BLDC GLUCOMTR-MCNC: 184 MG/DL — HIGH (ref 70–99)
GLUCOSE BLDC GLUCOMTR-MCNC: 202 MG/DL — HIGH (ref 70–99)
GLUCOSE SERPL-MCNC: 158 MG/DL — HIGH (ref 70–115)
HCT VFR BLD CALC: 29.7 % — LOW (ref 42–52)
HGB BLD-MCNC: 9.9 G/DL — LOW (ref 14–18)
MAGNESIUM SERPL-MCNC: 2.2 MG/DL — SIGNIFICANT CHANGE UP (ref 1.8–2.6)
MCHC RBC-ENTMCNC: 28.6 PG — SIGNIFICANT CHANGE UP (ref 27–31)
MCHC RBC-ENTMCNC: 33.3 G/DL — SIGNIFICANT CHANGE UP (ref 32–36)
MCV RBC AUTO: 85.8 FL — SIGNIFICANT CHANGE UP (ref 80–94)
PHOSPHATE SERPL-MCNC: 2.6 MG/DL — SIGNIFICANT CHANGE UP (ref 2.4–4.7)
PLATELET # BLD AUTO: 192 K/UL — SIGNIFICANT CHANGE UP (ref 150–400)
POTASSIUM SERPL-MCNC: 4.4 MMOL/L — SIGNIFICANT CHANGE UP (ref 3.5–5.3)
POTASSIUM SERPL-SCNC: 4.4 MMOL/L — SIGNIFICANT CHANGE UP (ref 3.5–5.3)
PROT SERPL-MCNC: 5.7 G/DL — LOW (ref 6.6–8.7)
RBC # BLD: 3.46 M/UL — LOW (ref 4.6–6.2)
RBC # FLD: 14.4 % — SIGNIFICANT CHANGE UP (ref 11–15.6)
SODIUM SERPL-SCNC: 138 MMOL/L — SIGNIFICANT CHANGE UP (ref 135–145)
WBC # BLD: 8.8 K/UL — SIGNIFICANT CHANGE UP (ref 4.8–10.8)
WBC # FLD AUTO: 8.8 K/UL — SIGNIFICANT CHANGE UP (ref 4.8–10.8)

## 2018-05-21 PROCEDURE — 71045 X-RAY EXAM CHEST 1 VIEW: CPT | Mod: 26

## 2018-05-21 PROCEDURE — 99232 SBSQ HOSP IP/OBS MODERATE 35: CPT | Mod: 24

## 2018-05-21 PROCEDURE — 93010 ELECTROCARDIOGRAM REPORT: CPT

## 2018-05-21 RX ORDER — OMEPRAZOLE 10 MG/1
20 CAPSULE, DELAYED RELEASE ORAL
Qty: 0 | Refills: 0 | COMMUNITY

## 2018-05-21 RX ADMIN — MUPIROCIN 1 APPLICATION(S): 20 OINTMENT TOPICAL at 05:41

## 2018-05-21 RX ADMIN — Medication 40 MILLIGRAM(S): at 05:38

## 2018-05-21 RX ADMIN — METFORMIN HYDROCHLORIDE 1000 MILLIGRAM(S): 850 TABLET ORAL at 17:38

## 2018-05-21 RX ADMIN — Medication 2: at 13:02

## 2018-05-21 RX ADMIN — Medication 100 MILLIGRAM(S): at 05:38

## 2018-05-21 RX ADMIN — AMIODARONE HYDROCHLORIDE 400 MILLIGRAM(S): 400 TABLET ORAL at 22:00

## 2018-05-21 RX ADMIN — Medication 4: at 08:40

## 2018-05-21 RX ADMIN — PANTOPRAZOLE SODIUM 40 MILLIGRAM(S): 20 TABLET, DELAYED RELEASE ORAL at 11:57

## 2018-05-21 RX ADMIN — Medication 12 UNIT(S): at 08:41

## 2018-05-21 RX ADMIN — SODIUM CHLORIDE 3 MILLILITER(S): 9 INJECTION INTRAMUSCULAR; INTRAVENOUS; SUBCUTANEOUS at 15:41

## 2018-05-21 RX ADMIN — AMIODARONE HYDROCHLORIDE 400 MILLIGRAM(S): 400 TABLET ORAL at 05:38

## 2018-05-21 RX ADMIN — AMIODARONE HYDROCHLORIDE 400 MILLIGRAM(S): 400 TABLET ORAL at 15:43

## 2018-05-21 RX ADMIN — Medication 2: at 17:38

## 2018-05-21 RX ADMIN — ATENOLOL 25 MILLIGRAM(S): 25 TABLET ORAL at 17:38

## 2018-05-21 RX ADMIN — ENOXAPARIN SODIUM 40 MILLIGRAM(S): 100 INJECTION SUBCUTANEOUS at 22:00

## 2018-05-21 RX ADMIN — Medication 325 MILLIGRAM(S): at 11:57

## 2018-05-21 RX ADMIN — SODIUM CHLORIDE 3 MILLILITER(S): 9 INJECTION INTRAMUSCULAR; INTRAVENOUS; SUBCUTANEOUS at 21:47

## 2018-05-21 RX ADMIN — Medication 12 UNIT(S): at 17:39

## 2018-05-21 RX ADMIN — Medication 20 MILLIEQUIVALENT(S): at 11:57

## 2018-05-21 RX ADMIN — Medication 100 MILLIGRAM(S): at 17:38

## 2018-05-21 RX ADMIN — INSULIN GLARGINE 35 UNIT(S): 100 INJECTION, SOLUTION SUBCUTANEOUS at 22:00

## 2018-05-21 RX ADMIN — ATORVASTATIN CALCIUM 40 MILLIGRAM(S): 80 TABLET, FILM COATED ORAL at 22:00

## 2018-05-21 RX ADMIN — METFORMIN HYDROCHLORIDE 1000 MILLIGRAM(S): 850 TABLET ORAL at 05:38

## 2018-05-21 RX ADMIN — ATENOLOL 25 MILLIGRAM(S): 25 TABLET ORAL at 05:38

## 2018-05-21 RX ADMIN — Medication 12 UNIT(S): at 13:02

## 2018-05-21 RX ADMIN — SODIUM CHLORIDE 3 MILLILITER(S): 9 INJECTION INTRAMUSCULAR; INTRAVENOUS; SUBCUTANEOUS at 05:36

## 2018-05-21 RX ADMIN — MUPIROCIN 1 APPLICATION(S): 20 OINTMENT TOPICAL at 17:39

## 2018-05-21 NOTE — PROGRESS NOTE ADULT - ASSESSMENT
DM type 2, insulin treated, with high insulin requirement. may in fact6 need more basal insulin; however expect gradual improvement and possible future tapering of insulin. Current control reasonable.

## 2018-05-21 NOTE — PROGRESS NOTE ADULT - SUBJECTIVE AND OBJECTIVE BOX
INTERVAL HPI/OVERNIGHT EVENTS: follow up of diabetes    MEDICATIONS  (STANDING):  allopurinol 100 milliGRAM(s) Oral two times a day  amiodarone    Tablet 400 milliGRAM(s) Oral every 8 hours  aspirin enteric coated 325 milliGRAM(s) Oral daily  ATENolol  Tablet 25 milliGRAM(s) Oral two times a day  atorvastatin 40 milliGRAM(s) Oral at bedtime  docusate sodium 100 milliGRAM(s) Oral three times a day  enoxaparin Injectable 40 milliGRAM(s) SubCutaneous daily  furosemide    Tablet 40 milliGRAM(s) Oral daily  insulin glargine Injectable (LANTUS) 35 Unit(s) SubCutaneous at bedtime  insulin lispro (HumaLOG) corrective regimen sliding scale   SubCutaneous Before meals and at bedtime  insulin lispro Injectable (HumaLOG) 12 Unit(s) SubCutaneous three times a day before meals  metFORMIN 1000 milliGRAM(s) Oral two times a day  mupirocin 2% Ointment 1 Application(s) Topical two times a day  pantoprazole    Tablet 40 milliGRAM(s) Oral daily  potassium chloride   Powder 20 milliEquivalent(s) Oral daily  senna 2 Tablet(s) Oral at bedtime  sodium chloride 0.9% lock flush 3 milliLiter(s) IV Push every 8 hours    MEDICATIONS  (PRN):  ondansetron Injectable 4 milliGRAM(s) IV Push every 6 hours PRN Nausea and/or Vomiting  oxyCODONE    5 mG/acetaminophen 325 mG 1 Tablet(s) Oral every 6 hours PRN Moderate Pain (4 - 6)  oxyCODONE    5 mG/acetaminophen 325 mG 2 Tablet(s) Oral every 6 hours PRN Severe Pain (7 - 10)  polyethylene glycol 3350 17 Gram(s) Oral daily PRN Constipation      Allergies    No Known Allergies    Intolerances    [facility] patient (Unknown)      Review of systems: eating well and ambulating. Was able to self inject insulin    Vital Signs Last 24 Hrs  T(C): 37.1 (21 May 2018 11:00), Max: 37.7 (20 May 2018 21:34)  T(F): 98.8 (21 May 2018 11:00), Max: 99.8 (20 May 2018 21:34)  HR: 59 (21 May 2018 11:00) (59 - 68)  BP: 104/54 (21 May 2018 11:00) (104/54 - 136/77)  BP(mean): --  RR: 18 (21 May 2018 11:00) (18 - 18)  SpO2: 95% (21 May 2018 11:00) (94% - 98%)      LABS:                        9.9    8.8   )-----------( 192      ( 21 May 2018 06:28 )             29.7     05-21    138  |  102  |  30.0<H>  ----------------------------<  158<H>  4.4   |  21.0<L>  |  0.86    Ca    8.7      21 May 2018 06:28  Phos  2.6     05-21  Mg     2.2     05-21    TPro  5.7<L>  /  Alb  3.2<L>  /  TBili  0.7  /  DBili  x   /  AST  30  /  ALT  56<H>  /  AlkPhos  193<H>  05-21          POCT Blood Glucose.: 151 mg/dL (05-21-18 @ 12:18)  POCT Blood Glucose.: 202 mg/dL (05-21-18 @ 08:19)  POCT Blood Glucose.: 148 mg/dL (05-20-18 @ 21:50)  POCT Blood Glucose.: 152 mg/dL (05-20-18 @ 17:03)  POCT Blood Glucose.: 247 mg/dL (05-20-18 @ 12:32)  POCT Blood Glucose.: 258 mg/dL (05-20-18 @ 08:14)  POCT Blood Glucose.: 206 mg/dL (05-19-18 @ 22:41)  POCT Blood Glucose.: 283 mg/dL (05-19-18 @ 16:33)  POCT Blood Glucose.: 254 mg/dL (05-19-18 @ 12:20)  POCT Blood Glucose.: 193 mg/dL (05-19-18 @ 07:54)  POCT Blood Glucose.: 181 mg/dL (05-19-18 @ 03:02)  POCT Blood Glucose.: 117 mg/dL (05-19-18 @ 00:09)  POCT Blood Glucose.: 129 mg/dL (05-18-18 @ 23:19)  POCT Blood Glucose.: 149 mg/dL (05-18-18 @ 22:07)  POCT Blood Glucose.: 158 mg/dL (05-18-18 @ 21:19)  POCT Blood Glucose.: 227 mg/dL (05-18-18 @ 18:25)  POCT Blood Glucose.: 225 mg/dL (05-18-18 @ 17:35)  POCT Blood Glucose.: 247 mg/dL (05-18-18 @ 16:28)

## 2018-05-21 NOTE — PROGRESS NOTE ADULT - PROBLEM SELECTOR PLAN 1
s/p C3L 5/17  Continue Aspirin, Atenolol, and Lipitor.  Encourage CDBE/IS and increased mobilization.  Daily diuretics for post op volume overload.

## 2018-05-21 NOTE — PROGRESS NOTE ADULT - ASSESSMENT
77 y/o M with PMHx of DM, HTN, HLD, GERD and gout presented to ED with 2xdays c/o intermittent chest pain radiating to his back. Pt underwent 2 recent stress test with a subsequent positive nuclear stress test. In th ED pt had neg. 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       5/17 s/p C3L (Lima-LAD, SVG-PDA/OM). Immediately post op pt received 1 unit of PRBCs for hematocrit of 24. Brief moment of a-fib with aberrancy with conversion to NSR, amio iv 150 given and PO load started.  Pt extubated without issue.  5/18 Amiodarone d/c'd, Lopressor started, hyperglycemic Endo consulted- Lantus 30u qhs and Humalog 10u added.  5/19 Rapid Afib 120-150> IV cardizem andIV lopressor given, and PO Amio load started.  Conversion to SR overnight.   5/20 Asymptomatic hypotension in AM.  Albumin x 1 given.  Atenolol decreased.  HCT 27.  PRBC x 1.  5/21 new to insulin> diabetic teaching w/ administration etc

## 2018-05-21 NOTE — PROGRESS NOTE ADULT - ASSESSMENT
1. 77 yo male presenting with USA-found to have tvcad-sp cabg. Good preop LV function.  2. brief episode of post op afib-on amio-will decrease dose to 400 bid.  3. IDDM 1. 77 yo male presenting with USA-found to have tvcad-sp cabg. Good preop LV function.  2. brief episode of post op afib-on amio-loading doses to be  followed by a maintenance dose.  3. IDDM

## 2018-05-21 NOTE — PROGRESS NOTE ADULT - SUBJECTIVE AND OBJECTIVE BOX
Subjective:  "This sugar thing is really making it hard for me to handle this diabetes"  OOB chair, wife visiting      Tele:   SR  60s           V/S:                    T(F): 98 (18 @ 05:20), Max: 99.8 (18 @ 21:34)  HR: 64 (18 @ 05:20) (63 - 68)  BP: 136/77 (18 @ 05:20) (108/62 - 136/77)  RR: 18 (18 @ 05:20) (18 - 18)  SpO2: 98% (18 @ 05:20) (94% - 98%)    LV EF: nml      Labs:      138  |  102  |  30.0<H>  ----------------------------<  158<H>  4.4   |  21.0<L>  |  0.86    Ca    8.7      21 May 2018 06:28  Phos  2.6       Mg     2.2         TPro  5.7<L>  /  Alb  3.2<L>  /  TBili  0.7  /  DBili  x   /  AST  30  /  ALT  56<H>  /  AlkPhos  193<H>                                 9.9    8.8   )-----------( 192      ( 21 May 2018 06:28 )             29.7             CAPILLARY BLOOD GLUCOSE      POCT Blood Glucose.: 202 mg/dL (21 May 2018 08:19)  POCT Blood Glucose.: 148 mg/dL (20 May 2018 21:50)  POCT Blood Glucose.: 152 mg/dL (20 May 2018 17:03)  POCT Blood Glucose.: 247 mg/dL (20 May 2018 12:32)           CXR:  Cardiomegaly. Left basilar atelectasis, unchanged. The right lung is   clear. No evidence of pneumothorax. Evidence of prior cardiac surgery..      EKG:  NSR  60  QTC  427    I&O's Detail    21 May 2018 07:01  -  21 May 2018 11:50  --------------------------------------------------------  IN:    Oral Fluid: 240 mL  Total IN: 240 mL    OUT:    Voided: 300 mL  Total OUT: 300 mL    Total NET: -60 mL          CHEST TUBE:  [ ] YES [x ] NO  OUTPUT:     per 24 hours    AIR LEAKS:  [ ] YES [ ] NO      VICKIE DRAIN:   [ ] YES [x ] NO  OUTPUT:     per 24 hours    EPICARDIAL WIRES:  [x ] YES [ ] NO      BOWEL MOVEMENT:  [x ] YES [ ] NO      Daily     Daily Weight in k.8 (21 May 2018 06:00)  Medications:  allopurinol 100 milliGRAM(s) Oral two times a day  amiodarone    Tablet 400 milliGRAM(s) Oral every 8 hours  aspirin enteric coated 325 milliGRAM(s) Oral daily  ATENolol  Tablet 25 milliGRAM(s) Oral two times a day  atorvastatin 40 milliGRAM(s) Oral at bedtime  docusate sodium 100 milliGRAM(s) Oral three times a day  enoxaparin Injectable 40 milliGRAM(s) SubCutaneous daily  furosemide    Tablet 40 milliGRAM(s) Oral daily  insulin glargine Injectable (LANTUS) 35 Unit(s) SubCutaneous at bedtime  insulin lispro (HumaLOG) corrective regimen sliding scale   SubCutaneous Before meals and at bedtime  insulin lispro Injectable (HumaLOG) 12 Unit(s) SubCutaneous three times a day before meals  metFORMIN 1000 milliGRAM(s) Oral two times a day  mupirocin 2% Ointment 1 Application(s) Topical two times a day  ondansetron Injectable 4 milliGRAM(s) IV Push every 6 hours PRN  oxyCODONE    5 mG/acetaminophen 325 mG 1 Tablet(s) Oral every 6 hours PRN  oxyCODONE    5 mG/acetaminophen 325 mG 2 Tablet(s) Oral every 6 hours PRN  pantoprazole    Tablet 40 milliGRAM(s) Oral daily  polyethylene glycol 3350 17 Gram(s) Oral daily PRN  potassium chloride   Powder 20 milliEquivalent(s) Oral daily  senna 2 Tablet(s) Oral at bedtime  sodium chloride 0.9% lock flush 3 milliLiter(s) IV Push every 8 hours        Physical Exam:    Neuro: alert, pleasant    Pulm: symmetrical expansion, demonstrates proper use incentive spirometry  diminished though clear lower fields    CV: S1 S2  RRR  +  isolated pacing wires    Abd: softly dostended non tender  Reports BM  this am    Extremities: RLE   ACE wrap thigh to foot> R thigh w/ staples> mepilex removed    Incision(s): sternum stable, mepilex in place                 PAST MEDICAL & SURGICAL HISTORY:  GERD (gastroesophageal reflux disease)  Gout  HLD (hyperlipidemia)  HTN (hypertension)  DM (diabetes mellitus)  No significant past surgical history

## 2018-05-21 NOTE — PROGRESS NOTE ADULT - SUBJECTIVE AND OBJECTIVE BOX
Chief Complaint: sp cabg    HPI: Recent chart and course reviewed. 77 yo M admitted with [facility]-underwent uncomplicated CABG. Feeling well w/o dyspnea or anginal sx's. PMH+ hpt and AODM. Meds reviewed.    PAST MEDICAL & SURGICAL HISTORY:  GERD (gastroesophageal reflux disease)  Gout  HLD (hyperlipidemia)  HTN (hypertension)  DM (diabetes mellitus)  No significant past surgical history      PREVIOUS DIAGNOSTIC TESTING:      ECHO  FINDINGS:    STRESS  FINDINGS:    CATHETERIZATION  FINDINGS: noted.    MEDICATIONS  (STANDING):  allopurinol 100 milliGRAM(s) Oral two times a day  amiodarone    Tablet 400 milliGRAM(s) Oral every 8 hours  aspirin enteric coated 325 milliGRAM(s) Oral daily  ATENolol  Tablet 25 milliGRAM(s) Oral two times a day  atorvastatin 40 milliGRAM(s) Oral at bedtime  docusate sodium 100 milliGRAM(s) Oral three times a day  enoxaparin Injectable 40 milliGRAM(s) SubCutaneous daily  furosemide    Tablet 40 milliGRAM(s) Oral daily  insulin glargine Injectable (LANTUS) 35 Unit(s) SubCutaneous at bedtime  insulin lispro (HumaLOG) corrective regimen sliding scale   SubCutaneous Before meals and at bedtime  insulin lispro Injectable (HumaLOG) 12 Unit(s) SubCutaneous three times a day before meals  metFORMIN 1000 milliGRAM(s) Oral two times a day  mupirocin 2% Ointment 1 Application(s) Topical two times a day  pantoprazole    Tablet 40 milliGRAM(s) Oral daily  potassium chloride   Powder 20 milliEquivalent(s) Oral daily  senna 2 Tablet(s) Oral at bedtime  sodium chloride 0.9% lock flush 3 milliLiter(s) IV Push every 8 hours    MEDICATIONS  (PRN):  ondansetron Injectable 4 milliGRAM(s) IV Push every 6 hours PRN Nausea and/or Vomiting  oxyCODONE    5 mG/acetaminophen 325 mG 1 Tablet(s) Oral every 6 hours PRN Moderate Pain (4 - 6)  oxyCODONE    5 mG/acetaminophen 325 mG 2 Tablet(s) Oral every 6 hours PRN Severe Pain (7 - 10)  polyethylene glycol 3350 17 Gram(s) Oral daily PRN Constipation      FAMILY HISTORY:  Family history of heart disease (Father, Sibling)      ROS: Negative other than as mentioned in HPI.    Vital Signs Last 24 Hrs  T(C): 36.7 (21 May 2018 05:20), Max: 37.7 (20 May 2018 21:34) Elderly alert afebrile M nad.  T(F): 98 (21 May 2018 05:20), Max: 99.8 (20 May 2018 21:34)  HR: 64 (21 May 2018 05:20) (62 - 69)  BP: 100 systolic la manually (21 May 2018 05:20) (92/54 - 136/77)  BP(mean): --  RR: 12 flat. (21 May 2018 05:20) (18 - 18)  SpO2: 98% (21 May 2018 05:20) (94% - 98%)    PHYSICAL EXAM:  General: Appears well developed, well nourished alert and cooperative.  HEENT: Head; normocephalic, atraumatic.  Eyes;   Pupils reactive, cornea wnl.  Neck; Supple, no nodes adenopathy, no NVD or carotid bruit or thyromegaly.  CARDIOVASCULAR; No murmur, rub, gallop or lift. Normal S1 and S2.  LUNGS; No rales, rhonchi or wheeze. Normal breath sounds bilaterally.  ABDOMEN ; Soft, nontender without mass or organomegaly. bowel sounds normoactive.  EXTREMITIES; No clubbing, cyanosis or edema. Right leg wrapped. Distal pulses wnl. ROM normal.  SKIN; warm and dry with normal turgor.  NEURO; Alert/oriented x 3/normal motor exam.    PSYCH; normal affect.            INTERPRETATION OF TELEMETRY:    ECG: monitor shows SR-had brief post op afib.  CXR-cabg/lungs clear.  I&O's Detail      LABS:                        9.9    8.8   )-----------( 192      ( 21 May 2018 06:28 )             29.7     05-21    138  |  102  |  30.0<H>  ----------------------------<  158<H>  4.4   |  21.0<L>  |  0.86    Ca    8.7      21 May 2018 06:28  Phos  2.6     05-21  Mg     2.2     05-21    TPro  5.7<L>  /  Alb  3.2<L>  /  TBili  0.7  /  DBili  x   /  AST  30  /  ALT  56<H>  /  AlkPhos  193<H>  05-21            I&O's Summary      RADIOLOGY & ADDITIONAL STUDIES:

## 2018-05-22 VITALS
SYSTOLIC BLOOD PRESSURE: 112 MMHG | TEMPERATURE: 98 F | OXYGEN SATURATION: 98 % | RESPIRATION RATE: 18 BRPM | DIASTOLIC BLOOD PRESSURE: 64 MMHG | HEART RATE: 62 BPM

## 2018-05-22 LAB
ALBUMIN SERPL ELPH-MCNC: 3.2 G/DL — LOW (ref 3.3–5.2)
ALP SERPL-CCNC: 172 U/L — HIGH (ref 40–120)
ALT FLD-CCNC: 55 U/L — HIGH
ANION GAP SERPL CALC-SCNC: 15 MMOL/L — SIGNIFICANT CHANGE UP (ref 5–17)
AST SERPL-CCNC: 29 U/L — SIGNIFICANT CHANGE UP
BILIRUB SERPL-MCNC: 0.6 MG/DL — SIGNIFICANT CHANGE UP (ref 0.4–2)
BUN SERPL-MCNC: 34 MG/DL — HIGH (ref 8–20)
CALCIUM SERPL-MCNC: 8.3 MG/DL — LOW (ref 8.6–10.2)
CHLORIDE SERPL-SCNC: 102 MMOL/L — SIGNIFICANT CHANGE UP (ref 98–107)
CO2 SERPL-SCNC: 22 MMOL/L — SIGNIFICANT CHANGE UP (ref 22–29)
CREAT SERPL-MCNC: 0.9 MG/DL — SIGNIFICANT CHANGE UP (ref 0.5–1.3)
GLUCOSE BLDC GLUCOMTR-MCNC: 106 MG/DL — HIGH (ref 70–99)
GLUCOSE BLDC GLUCOMTR-MCNC: 119 MG/DL — HIGH (ref 70–99)
GLUCOSE BLDC GLUCOMTR-MCNC: 135 MG/DL — HIGH (ref 70–99)
GLUCOSE SERPL-MCNC: 94 MG/DL — SIGNIFICANT CHANGE UP (ref 70–115)
HCT VFR BLD CALC: 29.5 % — LOW (ref 42–52)
HGB BLD-MCNC: 10 G/DL — LOW (ref 14–18)
MAGNESIUM SERPL-MCNC: 2 MG/DL — SIGNIFICANT CHANGE UP (ref 1.6–2.6)
MCHC RBC-ENTMCNC: 29.3 PG — SIGNIFICANT CHANGE UP (ref 27–31)
MCHC RBC-ENTMCNC: 33.9 G/DL — SIGNIFICANT CHANGE UP (ref 32–36)
MCV RBC AUTO: 86.5 FL — SIGNIFICANT CHANGE UP (ref 80–94)
PLATELET # BLD AUTO: 254 K/UL — SIGNIFICANT CHANGE UP (ref 150–400)
POTASSIUM SERPL-MCNC: 4.2 MMOL/L — SIGNIFICANT CHANGE UP (ref 3.5–5.3)
POTASSIUM SERPL-SCNC: 4.2 MMOL/L — SIGNIFICANT CHANGE UP (ref 3.5–5.3)
PROT SERPL-MCNC: 5.5 G/DL — LOW (ref 6.6–8.7)
RBC # BLD: 3.41 M/UL — LOW (ref 4.6–6.2)
RBC # FLD: 14 % — SIGNIFICANT CHANGE UP (ref 11–15.6)
SODIUM SERPL-SCNC: 139 MMOL/L — SIGNIFICANT CHANGE UP (ref 135–145)
WBC # BLD: 10.3 K/UL — SIGNIFICANT CHANGE UP (ref 4.8–10.8)
WBC # FLD AUTO: 10.3 K/UL — SIGNIFICANT CHANGE UP (ref 4.8–10.8)

## 2018-05-22 PROCEDURE — C1887: CPT

## 2018-05-22 PROCEDURE — 86900 BLOOD TYPING SEROLOGIC ABO: CPT

## 2018-05-22 PROCEDURE — 80048 BASIC METABOLIC PNL TOTAL CA: CPT

## 2018-05-22 PROCEDURE — 87640 STAPH A DNA AMP PROBE: CPT

## 2018-05-22 PROCEDURE — 99239 HOSP IP/OBS DSCHRG MGMT >30: CPT | Mod: 24

## 2018-05-22 PROCEDURE — 85014 HEMATOCRIT: CPT

## 2018-05-22 PROCEDURE — 93325 DOPPLER ECHO COLOR FLOW MAPG: CPT

## 2018-05-22 PROCEDURE — 83036 HEMOGLOBIN GLYCOSYLATED A1C: CPT

## 2018-05-22 PROCEDURE — 93458 L HRT ARTERY/VENTRICLE ANGIO: CPT

## 2018-05-22 PROCEDURE — 85730 THROMBOPLASTIN TIME PARTIAL: CPT

## 2018-05-22 PROCEDURE — 83735 ASSAY OF MAGNESIUM: CPT

## 2018-05-22 PROCEDURE — 82435 ASSAY OF BLOOD CHLORIDE: CPT

## 2018-05-22 PROCEDURE — 97163 PT EVAL HIGH COMPLEX 45 MIN: CPT

## 2018-05-22 PROCEDURE — 84443 ASSAY THYROID STIM HORMONE: CPT

## 2018-05-22 PROCEDURE — P9016: CPT

## 2018-05-22 PROCEDURE — 94770: CPT

## 2018-05-22 PROCEDURE — 82803 BLOOD GASES ANY COMBINATION: CPT

## 2018-05-22 PROCEDURE — 82962 GLUCOSE BLOOD TEST: CPT

## 2018-05-22 PROCEDURE — 93010 ELECTROCARDIOGRAM REPORT: CPT

## 2018-05-22 PROCEDURE — 85379 FIBRIN DEGRADATION QUANT: CPT

## 2018-05-22 PROCEDURE — 84295 ASSAY OF SERUM SODIUM: CPT

## 2018-05-22 PROCEDURE — 84100 ASSAY OF PHOSPHORUS: CPT

## 2018-05-22 PROCEDURE — 80053 COMPREHEN METABOLIC PANEL: CPT

## 2018-05-22 PROCEDURE — 97530 THERAPEUTIC ACTIVITIES: CPT

## 2018-05-22 PROCEDURE — 93880 EXTRACRANIAL BILAT STUDY: CPT

## 2018-05-22 PROCEDURE — 94010 BREATHING CAPACITY TEST: CPT

## 2018-05-22 PROCEDURE — P9045: CPT

## 2018-05-22 PROCEDURE — 83605 ASSAY OF LACTIC ACID: CPT

## 2018-05-22 PROCEDURE — 81001 URINALYSIS AUTO W/SCOPE: CPT

## 2018-05-22 PROCEDURE — 93005 ELECTROCARDIOGRAM TRACING: CPT

## 2018-05-22 PROCEDURE — 82330 ASSAY OF CALCIUM: CPT

## 2018-05-22 PROCEDURE — 83880 ASSAY OF NATRIURETIC PEPTIDE: CPT

## 2018-05-22 PROCEDURE — C1889: CPT

## 2018-05-22 PROCEDURE — 86923 COMPATIBILITY TEST ELECTRIC: CPT

## 2018-05-22 PROCEDURE — 86850 RBC ANTIBODY SCREEN: CPT

## 2018-05-22 PROCEDURE — 82553 CREATINE MB FRACTION: CPT

## 2018-05-22 PROCEDURE — C1894: CPT

## 2018-05-22 PROCEDURE — 71045 X-RAY EXAM CHEST 1 VIEW: CPT | Mod: 26

## 2018-05-22 PROCEDURE — 36415 COLL VENOUS BLD VENIPUNCTURE: CPT

## 2018-05-22 PROCEDURE — 94002 VENT MGMT INPAT INIT DAY: CPT

## 2018-05-22 PROCEDURE — 82947 ASSAY GLUCOSE BLOOD QUANT: CPT

## 2018-05-22 PROCEDURE — 85610 PROTHROMBIN TIME: CPT

## 2018-05-22 PROCEDURE — 84484 ASSAY OF TROPONIN QUANT: CPT

## 2018-05-22 PROCEDURE — 80061 LIPID PANEL: CPT

## 2018-05-22 PROCEDURE — 84550 ASSAY OF BLOOD/URIC ACID: CPT

## 2018-05-22 PROCEDURE — 84134 ASSAY OF PREALBUMIN: CPT

## 2018-05-22 PROCEDURE — 86901 BLOOD TYPING SEROLOGIC RH(D): CPT

## 2018-05-22 PROCEDURE — 71045 X-RAY EXAM CHEST 1 VIEW: CPT

## 2018-05-22 PROCEDURE — 93320 DOPPLER ECHO COMPLETE: CPT

## 2018-05-22 PROCEDURE — 94760 N-INVAS EAR/PLS OXIMETRY 1: CPT

## 2018-05-22 PROCEDURE — 85027 COMPLETE CBC AUTOMATED: CPT

## 2018-05-22 PROCEDURE — 36430 TRANSFUSION BLD/BLD COMPNT: CPT

## 2018-05-22 PROCEDURE — 84132 ASSAY OF SERUM POTASSIUM: CPT

## 2018-05-22 PROCEDURE — 93306 TTE W/DOPPLER COMPLETE: CPT

## 2018-05-22 PROCEDURE — 82550 ASSAY OF CK (CPK): CPT

## 2018-05-22 PROCEDURE — 97116 GAIT TRAINING THERAPY: CPT

## 2018-05-22 PROCEDURE — 87641 MR-STAPH DNA AMP PROBE: CPT

## 2018-05-22 PROCEDURE — 99285 EMERGENCY DEPT VISIT HI MDM: CPT | Mod: 25

## 2018-05-22 PROCEDURE — C1769: CPT

## 2018-05-22 PROCEDURE — 93312 ECHO TRANSESOPHAGEAL: CPT

## 2018-05-22 RX ORDER — METFORMIN HYDROCHLORIDE 850 MG/1
1000 TABLET ORAL
Qty: 0 | Refills: 0 | COMMUNITY

## 2018-05-22 RX ORDER — DOCUSATE SODIUM 100 MG
1 CAPSULE ORAL
Qty: 0 | Refills: 0 | COMMUNITY
Start: 2018-05-22

## 2018-05-22 RX ORDER — METFORMIN HYDROCHLORIDE 850 MG/1
1 TABLET ORAL
Qty: 60 | Refills: 1 | OUTPATIENT
Start: 2018-05-22 | End: 2018-07-20

## 2018-05-22 RX ORDER — ALLOPURINOL 300 MG
1 TABLET ORAL
Qty: 0 | Refills: 0 | COMMUNITY

## 2018-05-22 RX ORDER — FUROSEMIDE 40 MG
1 TABLET ORAL
Qty: 14 | Refills: 0 | OUTPATIENT
Start: 2018-05-22 | End: 2018-06-04

## 2018-05-22 RX ORDER — ASPIRIN/CALCIUM CARB/MAGNESIUM 324 MG
1 TABLET ORAL
Qty: 0 | Refills: 0 | COMMUNITY

## 2018-05-22 RX ORDER — ATENOLOL 25 MG/1
1 TABLET ORAL
Qty: 60 | Refills: 1 | OUTPATIENT
Start: 2018-05-22 | End: 2018-07-20

## 2018-05-22 RX ORDER — AMIODARONE HYDROCHLORIDE 400 MG/1
1 TABLET ORAL
Qty: 30 | Refills: 1 | OUTPATIENT
Start: 2018-05-22 | End: 2018-07-20

## 2018-05-22 RX ORDER — ATORVASTATIN CALCIUM 80 MG/1
1 TABLET, FILM COATED ORAL
Qty: 30 | Refills: 1 | OUTPATIENT
Start: 2018-05-22 | End: 2018-07-20

## 2018-05-22 RX ORDER — SENNA PLUS 8.6 MG/1
2 TABLET ORAL
Qty: 0 | Refills: 0 | COMMUNITY
Start: 2018-05-22

## 2018-05-22 RX ORDER — ALLOPURINOL 300 MG
1 TABLET ORAL
Qty: 60 | Refills: 1 | OUTPATIENT
Start: 2018-05-22 | End: 2018-07-20

## 2018-05-22 RX ORDER — POTASSIUM CHLORIDE 20 MEQ
1 PACKET (EA) ORAL
Qty: 14 | Refills: 0 | OUTPATIENT
Start: 2018-05-22 | End: 2018-06-04

## 2018-05-22 RX ORDER — ENOXAPARIN SODIUM 100 MG/ML
35 INJECTION SUBCUTANEOUS
Qty: 1 | Refills: 1 | OUTPATIENT
Start: 2018-05-22 | End: 2018-07-20

## 2018-05-22 RX ORDER — INSULIN LISPRO 100/ML
12 VIAL (ML) SUBCUTANEOUS
Qty: 1 | Refills: 1 | OUTPATIENT
Start: 2018-05-22 | End: 2018-07-20

## 2018-05-22 RX ORDER — INSULIN ASPART 100 [IU]/ML
8 INJECTION, SOLUTION SUBCUTANEOUS
Qty: 15 | Refills: 1 | OUTPATIENT
Start: 2018-05-22

## 2018-05-22 RX ORDER — ASPIRIN/CALCIUM CARB/MAGNESIUM 324 MG
1 TABLET ORAL
Qty: 30 | Refills: 1 | OUTPATIENT
Start: 2018-05-22 | End: 2018-07-20

## 2018-05-22 RX ADMIN — Medication 100 MILLIGRAM(S): at 17:31

## 2018-05-22 RX ADMIN — AMIODARONE HYDROCHLORIDE 400 MILLIGRAM(S): 400 TABLET ORAL at 13:15

## 2018-05-22 RX ADMIN — Medication 20 MILLIEQUIVALENT(S): at 12:12

## 2018-05-22 RX ADMIN — Medication 40 MILLIGRAM(S): at 05:26

## 2018-05-22 RX ADMIN — Medication 100 MILLIGRAM(S): at 05:26

## 2018-05-22 RX ADMIN — AMIODARONE HYDROCHLORIDE 400 MILLIGRAM(S): 400 TABLET ORAL at 05:26

## 2018-05-22 RX ADMIN — SODIUM CHLORIDE 3 MILLILITER(S): 9 INJECTION INTRAMUSCULAR; INTRAVENOUS; SUBCUTANEOUS at 13:06

## 2018-05-22 RX ADMIN — ATENOLOL 25 MILLIGRAM(S): 25 TABLET ORAL at 17:31

## 2018-05-22 RX ADMIN — SODIUM CHLORIDE 3 MILLILITER(S): 9 INJECTION INTRAMUSCULAR; INTRAVENOUS; SUBCUTANEOUS at 05:23

## 2018-05-22 RX ADMIN — Medication 12 UNIT(S): at 08:45

## 2018-05-22 RX ADMIN — Medication 12 UNIT(S): at 17:31

## 2018-05-22 RX ADMIN — Medication 12 UNIT(S): at 12:49

## 2018-05-22 RX ADMIN — PANTOPRAZOLE SODIUM 40 MILLIGRAM(S): 20 TABLET, DELAYED RELEASE ORAL at 12:12

## 2018-05-22 RX ADMIN — METFORMIN HYDROCHLORIDE 1000 MILLIGRAM(S): 850 TABLET ORAL at 17:31

## 2018-05-22 RX ADMIN — METFORMIN HYDROCHLORIDE 1000 MILLIGRAM(S): 850 TABLET ORAL at 12:15

## 2018-05-22 RX ADMIN — Medication 325 MILLIGRAM(S): at 12:15

## 2018-05-22 NOTE — PROGRESS NOTE ADULT - PROVIDER SPECIALTY LIST ADULT
CT Surgery
Cardiology
Endocrinology
Endocrinology
Hospitalist
Hospitalist
Cardiology
Endocrinology
CT Surgery
CT Surgery

## 2018-05-22 NOTE — PROGRESS NOTE ADULT - PROBLEM SELECTOR PLAN 8
Continue home dose allopurinol.
Continue home dose allopurinol.
Continue home dose allopurinol.  No acute flare up at current time.
Continue home dose allopurinol

## 2018-05-22 NOTE — PROGRESS NOTE ADULT - PROBLEM SELECTOR PROBLEM 6
Anemia due to blood loss
Anemia due to blood loss
Prophylactic measure
Anemia due to blood loss
Prophylactic measure

## 2018-05-22 NOTE — PROGRESS NOTE ADULT - PROBLEM SELECTOR PLAN 4
Atenolol rhythm,  rate control.  remains  SR  Amiodarone load completing tomorrow
Atenolol rhythm,  rate control.  remains  SR
continue with zyloprim
Lopressor switched to Atenolol yesterday for better rate control.  Converted to SR overnight.
continue with zyloprim

## 2018-05-22 NOTE — PROGRESS NOTE ADULT - PROBLEM SELECTOR PLAN 1
s/p C3L 5/17  Continue Aspirin, Atenolol, and Lipitor.  Encourage CDBE/IS and increased mobilization.  Daily diuretics for post op volume overload.  Possible discharge today after pacing wires removed and showered.

## 2018-05-22 NOTE — PROGRESS NOTE ADULT - ASSESSMENT
1. 79 yo male presenting with USA-found to have tvcad-sp cabg. Good preop LV function.  2. brief episode of post op afib-on amio-loading doses to be  followed by a maintenance dose.  3. IDDM      DC home  will FUP at [facility] in 2 weeks

## 2018-05-22 NOTE — PROGRESS NOTE ADULT - PROBLEM SELECTOR PLAN 3
resolved
resolved
Continue with statin
Asymptomatic hypotension this AM with SBP 80's.  Atenolol decreased to 25mg BID.  Lasix held this AM.  Albumin x1 given this AM.  Transfuse PRBC x 1 today.
Continue with statin

## 2018-05-22 NOTE — PROGRESS NOTE ADULT - SUBJECTIVE AND OBJECTIVE BOX
Chief Complaint: sp cabg    HPI: Recent chart and course reviewed. 79 yo M admitted with [facility]-underwent uncomplicated CABG. Feeling well w/o dyspnea or anginal sx's. PMH+ hpt and AODM. Meds reviewed.    PAST MEDICAL & SURGICAL HISTORY:  GERD (gastroesophageal reflux disease)  Gout  HLD (hyperlipidemia)  HTN (hypertension)  DM (diabetes mellitus)  No significant past surgical history      PREVIOUS DIAGNOSTIC TESTING:      ECHO  FINDINGS:    STRESS  FINDINGS:    CATHETERIZATION  FINDINGS: noted.    MEDICATIONS  (STANDING):  allopurinol 100 milliGRAM(s) Oral two times a day  amiodarone    Tablet 400 milliGRAM(s) Oral every 8 hours  aspirin enteric coated 325 milliGRAM(s) Oral daily  ATENolol  Tablet 25 milliGRAM(s) Oral two times a day  atorvastatin 40 milliGRAM(s) Oral at bedtime  docusate sodium 100 milliGRAM(s) Oral three times a day  enoxaparin Injectable 40 milliGRAM(s) SubCutaneous daily  furosemide    Tablet 40 milliGRAM(s) Oral daily  insulin glargine Injectable (LANTUS) 35 Unit(s) SubCutaneous at bedtime  insulin lispro (HumaLOG) corrective regimen sliding scale   SubCutaneous Before meals and at bedtime  insulin lispro Injectable (HumaLOG) 12 Unit(s) SubCutaneous three times a day before meals  metFORMIN 1000 milliGRAM(s) Oral two times a day  mupirocin 2% Ointment 1 Application(s) Topical two times a day  pantoprazole    Tablet 40 milliGRAM(s) Oral daily  potassium chloride   Powder 20 milliEquivalent(s) Oral daily  senna 2 Tablet(s) Oral at bedtime  sodium chloride 0.9% lock flush 3 milliLiter(s) IV Push every 8 hours    MEDICATIONS  (PRN):  ondansetron Injectable 4 milliGRAM(s) IV Push every 6 hours PRN Nausea and/or Vomiting  oxyCODONE    5 mG/acetaminophen 325 mG 1 Tablet(s) Oral every 6 hours PRN Moderate Pain (4 - 6)  oxyCODONE    5 mG/acetaminophen 325 mG 2 Tablet(s) Oral every 6 hours PRN Severe Pain (7 - 10)  polyethylene glycol 3350 17 Gram(s) Oral daily PRN Constipation      FAMILY HISTORY:  Family history of heart disease (Father, Sibling)      ROS: Negative other than as mentioned in HPI.    Vital Signs Last 24 Hrs  T(C): 36.7 (21 May 2018 05:20), Max: 37.7 (20 May 2018 21:34) Elderly alert afebrile M nad.  T(F): 98 (21 May 2018 05:20), Max: 99.8 (20 May 2018 21:34)  HR: 64 (21 May 2018 05:20) (62 - 69)  BP: 100 systolic la manually (21 May 2018 05:20) (92/54 - 136/77)  BP(mean): --  RR: 12 flat. (21 May 2018 05:20) (18 - 18)  SpO2: 98% (21 May 2018 05:20) (94% - 98%)    PHYSICAL EXAM:  General: Appears well developed, well nourished alert and cooperative.  HEENT: Head; normocephalic, atraumatic.  Eyes;   Pupils reactive, cornea wnl.  Neck; Supple, no nodes adenopathy, no NVD or carotid bruit or thyromegaly.  CARDIOVASCULAR; No murmur, rub, gallop or lift. Normal S1 and S2.  LUNGS; No rales, rhonchi or wheeze. Normal breath sounds bilaterally.  ABDOMEN ; Soft, nontender without mass or organomegaly. bowel sounds normoactive.  EXTREMITIES; No clubbing, cyanosis or edema. Right leg wrapped. Distal pulses wnl. ROM normal.  SKIN; warm and dry with normal turgor.  NEURO; Alert/oriented x 3/normal motor exam.    PSYCH; normal affect.            INTERPRETATION OF TELEMETRY:    ECG: monitor shows SR-had brief post op afib.  CXR-cabg/lungs clear.  I&O's Detail      LABS:                        9.9    8.8   )-----------( 192      ( 21 May 2018 06:28 )             29.7     05-21    138  |  102  |  30.0<H>  ----------------------------<  158<H>  4.4   |  21.0<L>  |  0.86    Ca    8.7      21 May 2018 06:28  Phos  2.6     05-21  Mg     2.2     05-21    TPro  5.7<L>  /  Alb  3.2<L>  /  TBili  0.7  /  DBili  x   /  AST  30  /  ALT  56<H>  /  AlkPhos  193<H>  05-21            I&O's Summary      RADIOLOGY & ADDITIONAL STUDIES:

## 2018-05-22 NOTE — PROGRESS NOTE ADULT - PROBLEM SELECTOR PROBLEM 4
Paroxysmal atrial fibrillation
Paroxysmal atrial fibrillation
Gout
Gout
Paroxysmal atrial fibrillation

## 2018-05-22 NOTE — PROGRESS NOTE ADULT - ASSESSMENT
79 y/o M with PMHx of DM, HTN, HLD, GERD and gout presented to ED with 2xdays c/o intermittent chest pain radiating to his back. Pt underwent 2 recent stress test with a subsequent positive nuclear stress test. In th ED pt had neg. 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       5/17 s/p C3L (Lima-LAD, SVG-PDA/OM). Immediately post op pt received 1 unit of PRBCs for hematocrit of 24. Brief moment of a-fib with aberrancy with conversion to NSR, amio iv 150 given and PO load started.  Pt extubated without issue.  5/18 Amiodarone d/c'd, Lopressor started, hyperglycemic Endo consulted- Lantus 30u qhs and Humalog 10u added.  5/19 Rapid Afib 120-150> IV cardizem andIV lopressor given, and PO Amio load started.  Conversion to SR overnight.   5/20 Asymptomatic hypotension in AM.  Albumin x 1 given.  Atenolol decreased.  HCT 27.  PRBC x 1.  5/21 new to insulin> diabetic teaching w/ administration etc  5/22 pacing wires removed,

## 2018-05-22 NOTE — PROGRESS NOTE ADULT - PROBLEM SELECTOR PLAN 9
Continue Protonix for GI prophylaxis.  Continue Lovenox for DVT prophylaxis.    Possible discharge to home today.  Discussed with CT surgery team in AM rounds.
Continue Protonix for GI prophylaxis.  Continue Lovenox for DVT prophylaxis.    Discussed with CT surgery team in AM rounds.
Continue Protonix for GI prophylaxis.  Continue Lovenox for DVT prophylaxis.    Discussed with CT surgery team in AM rounds.

## 2018-05-22 NOTE — PROGRESS NOTE ADULT - PROBLEM SELECTOR PROBLEM 1
Coronary artery disease of native artery of native heart with stable angina pectoris

## 2018-05-22 NOTE — PROGRESS NOTE ADULT - PROBLEM SELECTOR PROBLEM 8
Chronic gout without tophus, unspecified cause, unspecified site

## 2018-05-22 NOTE — PROGRESS NOTE ADULT - SUBJECTIVE AND OBJECTIVE BOX
Subjective: "I would like to leave today, that would be great." Patient OOB to chair, NAD. Denies CP, SOB.     VITAL SIGNS  Vital Signs Last 24 Hrs  T(C): 36.9 (18 @ 09:56), Max: 37.1 (18 @ 11:00)  T(F): 98.5 (18 @ 09:56), Max: 98.8 (18 @ 11:00)  HR: 58 (18 @ 09:56) (58 - 67)  BP: 105/58 (18 @ 09:56) (100/53 - 116/69)  RR: 20 (18 @ 09:56) (18 - 20)  SpO2: 100% (18 @ 05:25) (95% - 100%)  on RA           Telemetry/Alarms:  SR/SB 50-60  LVEF: normal    MEDICATIONS  allopurinol 100 milliGRAM(s) Oral two times a day  amiodarone    Tablet 400 milliGRAM(s) Oral every 8 hours  aspirin enteric coated 325 milliGRAM(s) Oral daily  ATENolol  Tablet 25 milliGRAM(s) Oral two times a day  atorvastatin 40 milliGRAM(s) Oral at bedtime  docusate sodium 100 milliGRAM(s) Oral three times a day  enoxaparin Injectable 40 milliGRAM(s) SubCutaneous daily  furosemide    Tablet 40 milliGRAM(s) Oral daily  insulin glargine Injectable (LANTUS) 35 Unit(s) SubCutaneous at bedtime  insulin lispro (HumaLOG) corrective regimen sliding scale   SubCutaneous Before meals and at bedtime  insulin lispro Injectable (HumaLOG) 12 Unit(s) SubCutaneous three times a day before meals  metFORMIN 1000 milliGRAM(s) Oral two times a day  mupirocin 2% Ointment 1 Application(s) Topical two times a day  ondansetron Injectable 4 milliGRAM(s) IV Push every 6 hours PRN  oxyCODONE    5 mG/acetaminophen 325 mG 1 Tablet(s) Oral every 6 hours PRN  oxyCODONE    5 mG/acetaminophen 325 mG 2 Tablet(s) Oral every 6 hours PRN  pantoprazole    Tablet 40 milliGRAM(s) Oral daily  polyethylene glycol 3350 17 Gram(s) Oral daily PRN  potassium chloride   Powder 20 milliEquivalent(s) Oral daily  senna 2 Tablet(s) Oral at bedtime  sodium chloride 0.9% lock flush 3 milliLiter(s) IV Push every 8 hours      PHYSICAL EXAM  General: well nourished, well developed, no acute distress  Neurology: alert and oriented x 3, nonfocal, no gross deficits  Respiratory: clear to auscultation bilaterally  CV: regular rate and rhythm, normal S1, S2  Abdomen: soft, nontender, nondistended, positive bowel sounds, last bowel movement yesterday  Extremities: warm, well perfused. +1-2 edema RLE, trace LLE. + DP pulses  Incisions: midline sternal incision, + mepilex > removed + stables, c/d/i. sternum stable. CT site silks, well approximated  RLE upper thigh incision with serous drainage + staples, Inc at knee + staples c/d/i  Epicardial Wires: isolated > removed       @ 07:01  -   @ 07:00  --------------------------------------------------------  IN: 240 mL / OUT: 650 mL / NET: -410 mL        Weights:  Daily     Daily Weight in k.3 (22 May 2018 06:22)  Admit Wt: Drug Dosing Weight  Height (cm): 172.72 (14 May 2018 19:37)  Weight (kg): 78 (14 May 2018 19:37)  BMI (kg/m2): 26.1 (14 May 2018 19:37)  BSA (m2): 1.92 (14 May 2018 19:37)    LABS      139  |  102  |  34.0<H>  ----------------------------<  94  4.2   |  22.0  |  0.90    Ca    8.3<L>      22 May 2018 05:58  Phos  2.6       Mg     2.0         TPro  5.5<L>  /  Alb  3.2<L>  /  TBili  0.6  /  DBili  x   /  AST  29  /  ALT  55<H>  /  AlkPhos  172<H>                                   10.0   10.3  )-----------( 254      ( 22 May 2018 05:58 )             29.5            Bilirubin Total, Serum: 0.6 mg/dL ( @ 05:58)    CAPILLARY BLOOD GLUCOSE      POCT Blood Glucose.: 106 mg/dL (22 May 2018 08:16)  POCT Blood Glucose.: 144 mg/dL (21 May 2018 21:36)  POCT Blood Glucose.: 184 mg/dL (21 May 2018 16:48)  POCT Blood Glucose.: 151 mg/dL (21 May 2018 12:18)           Today's CXR: NAD    Today's EKG: SB 59     PAST MEDICAL & SURGICAL HISTORY:  GERD (gastroesophageal reflux disease)  Gout  HLD (hyperlipidemia)  HTN (hypertension)  DM (diabetes mellitus)  No significant past surgical history

## 2018-05-22 NOTE — PROGRESS NOTE ADULT - PROBLEM SELECTOR PROBLEM 3
Hypotension, unspecified hypotension type
Hyperlipidemia, unspecified hyperlipidemia type
Hyperlipidemia, unspecified hyperlipidemia type
Hypotension, unspecified hypotension type
Hypotension, unspecified hypotension type

## 2018-05-22 NOTE — PROGRESS NOTE ADULT - ASSESSMENT
well controlled diabetes, but with overnight downward drift in glucose. Lantus reduced to 30 units and prandial reduced to 8 units. Outpatient follow up discussed

## 2018-05-22 NOTE — PROGRESS NOTE ADULT - PROBLEM SELECTOR PROBLEM 7
Type 2 diabetes mellitus with hyperglycemia, without long-term current use of insulin

## 2018-05-22 NOTE — PROGRESS NOTE ADULT - PROBLEM SELECTOR PLAN 6
5/17 1 unit PRBCs immediately post op for hematocrit of 24  HCT stable
5/17 1 unit PRBCs immediately post op for hematocrit of 24  HCT stable
continue post-op GI, DVT, abx prophylaxis
5/17 1 unit PRBCs immediately post op for hematocrit of 24  HCT slightly lower today at 27 from 29.  Transfuse PRBC x 1 today.
continue post-op GI, DVT, abx prophylaxis

## 2018-05-22 NOTE — PROGRESS NOTE ADULT - SUBJECTIVE AND OBJECTIVE BOX
INTERVAL HPI/OVERNIGHT EVENTS:  follow up diabetes    MEDICATIONS  (STANDING):  allopurinol 100 milliGRAM(s) Oral two times a day  amiodarone    Tablet 400 milliGRAM(s) Oral every 8 hours  aspirin enteric coated 325 milliGRAM(s) Oral daily  ATENolol  Tablet 25 milliGRAM(s) Oral two times a day  atorvastatin 40 milliGRAM(s) Oral at bedtime  docusate sodium 100 milliGRAM(s) Oral three times a day  enoxaparin Injectable 40 milliGRAM(s) SubCutaneous daily  furosemide    Tablet 40 milliGRAM(s) Oral daily  insulin glargine Injectable (LANTUS) 35 Unit(s) SubCutaneous at bedtime  insulin lispro (HumaLOG) corrective regimen sliding scale   SubCutaneous Before meals and at bedtime  insulin lispro Injectable (HumaLOG) 12 Unit(s) SubCutaneous three times a day before meals  metFORMIN 1000 milliGRAM(s) Oral two times a day  mupirocin 2% Ointment 1 Application(s) Topical two times a day  pantoprazole    Tablet 40 milliGRAM(s) Oral daily  potassium chloride   Powder 20 milliEquivalent(s) Oral daily  senna 2 Tablet(s) Oral at bedtime  sodium chloride 0.9% lock flush 3 milliLiter(s) IV Push every 8 hours    MEDICATIONS  (PRN):  ondansetron Injectable 4 milliGRAM(s) IV Push every 6 hours PRN Nausea and/or Vomiting  oxyCODONE    5 mG/acetaminophen 325 mG 1 Tablet(s) Oral every 6 hours PRN Moderate Pain (4 - 6)  oxyCODONE    5 mG/acetaminophen 325 mG 2 Tablet(s) Oral every 6 hours PRN Severe Pain (7 - 10)  polyethylene glycol 3350 17 Gram(s) Oral daily PRN Constipation      Allergies    No Known Allergies    Intolerances    [facility] patient (Unknown)    Vital Signs Last 24 Hrs  T(C): 37.1 (22 May 2018 15:27), Max: 37.1 (22 May 2018 15:27)  T(F): 98.8 (22 May 2018 15:27), Max: 98.8 (22 May 2018 15:27)  HR: 60 (22 May 2018 15:27) (58 - 62)  BP: 103/56 (22 May 2018 15:27) (100/53 - 113/60)  BP(mean): --  RR: 18 (22 May 2018 15:27) (18 - 20)  SpO2: 97% (22 May 2018 15:27) (97% - 100%)      LABS:                        10.0   10.3  )-----------( 254      ( 22 May 2018 05:58 )             29.5     05-22    139  |  102  |  34.0<H>  ----------------------------<  94  4.2   |  22.0  |  0.90    Ca    8.3<L>      22 May 2018 05:58  Phos  2.6     05-21  Mg     2.0     05-22    TPro  5.5<L>  /  Alb  3.2<L>  /  TBili  0.6  /  DBili  x   /  AST  29  /  ALT  55<H>  /  AlkPhos  172<H>  05-22          POCT Blood Glucose.: 135 mg/dL (05-22-18 @ 12:28)  POCT Blood Glucose.: 106 mg/dL (05-22-18 @ 08:16)  POCT Blood Glucose.: 144 mg/dL (05-21-18 @ 21:36)  POCT Blood Glucose.: 184 mg/dL (05-21-18 @ 16:48)  POCT Blood Glucose.: 151 mg/dL (05-21-18 @ 12:18)  POCT Blood Glucose.: 202 mg/dL (05-21-18 @ 08:19)  POCT Blood Glucose.: 148 mg/dL (05-20-18 @ 21:50)  POCT Blood Glucose.: 152 mg/dL (05-20-18 @ 17:03)  POCT Blood Glucose.: 247 mg/dL (05-20-18 @ 12:32)  POCT Blood Glucose.: 258 mg/dL (05-20-18 @ 08:14)  POCT Blood Glucose.: 206 mg/dL (05-19-18 @ 22:41)  POCT Blood Glucose.: 283 mg/dL (05-19-18 @ 16:33)

## 2018-05-22 NOTE — PROGRESS NOTE ADULT - PROBLEM SELECTOR PLAN 2
Atenolol  25mg BID.  DASH/TLC diet.
Lopressor 25 BID ( hold for systolic less than 100 and HR less than 60)
Hypotensive this AM.  Atenolol decreased to 25mg BID.  DASH/TLC diet.
Atenolol  25mg BID.  DASH/TLC diet.
Lopressor 25 BID ( hold for systolic less than 100 and HR less than 60)

## 2018-05-23 ENCOUNTER — RECORD ABSTRACTING (OUTPATIENT)
Age: 78
End: 2018-05-23

## 2018-05-23 RX ORDER — SAXAGLIPTIN 5 MG/1
5 TABLET, FILM COATED ORAL
Qty: 90 | Refills: 1 | Status: DISCONTINUED | COMMUNITY
Start: 2017-04-06 | End: 2018-05-23

## 2018-06-12 ENCOUNTER — APPOINTMENT (OUTPATIENT)
Dept: CARDIOTHORACIC SURGERY | Facility: CLINIC | Age: 78
End: 2018-06-12

## 2018-06-12 VITALS
DIASTOLIC BLOOD PRESSURE: 83 MMHG | WEIGHT: 168 LBS | HEIGHT: 66.5 IN | RESPIRATION RATE: 16 BRPM | OXYGEN SATURATION: 100 % | BODY MASS INDEX: 26.68 KG/M2 | SYSTOLIC BLOOD PRESSURE: 151 MMHG | HEART RATE: 60 BPM

## 2018-06-18 ENCOUNTER — APPOINTMENT (OUTPATIENT)
Dept: INTERNAL MEDICINE | Facility: CLINIC | Age: 78
End: 2018-06-18

## 2018-06-19 ENCOUNTER — APPOINTMENT (OUTPATIENT)
Dept: INTERNAL MEDICINE | Facility: CLINIC | Age: 78
End: 2018-06-19

## 2018-06-22 ENCOUNTER — LABORATORY RESULT (OUTPATIENT)
Age: 78
End: 2018-06-22

## 2018-06-22 ENCOUNTER — APPOINTMENT (OUTPATIENT)
Dept: INTERNAL MEDICINE | Facility: CLINIC | Age: 78
End: 2018-06-22
Payer: MEDICARE

## 2018-06-22 VITALS
DIASTOLIC BLOOD PRESSURE: 80 MMHG | RESPIRATION RATE: 16 BRPM | OXYGEN SATURATION: 94 % | HEIGHT: 66.5 IN | HEART RATE: 69 BPM | BODY MASS INDEX: 27.79 KG/M2 | SYSTOLIC BLOOD PRESSURE: 135 MMHG | WEIGHT: 175 LBS

## 2018-06-22 PROCEDURE — 36415 COLL VENOUS BLD VENIPUNCTURE: CPT

## 2018-06-22 PROCEDURE — 99214 OFFICE O/P EST MOD 30 MIN: CPT | Mod: 25

## 2018-06-22 RX ORDER — DOCUSATE SODIUM 100 MG/1
CAPSULE ORAL
Refills: 0 | Status: DISCONTINUED | COMMUNITY
End: 2018-06-22

## 2018-06-22 RX ORDER — CALCIUM CARBONATE/VITAMIN D3 600 MG-10
TABLET ORAL
Refills: 0 | Status: DISCONTINUED | COMMUNITY
End: 2018-06-22

## 2018-06-22 RX ORDER — SENNOSIDES 8.6 MG TABLETS 8.6 MG/1
TABLET ORAL
Refills: 0 | Status: DISCONTINUED | COMMUNITY
End: 2018-06-22

## 2018-06-22 RX ORDER — LANCETS 30 GAUGE
EACH MISCELLANEOUS
Qty: 300 | Refills: 0 | Status: ACTIVE | COMMUNITY
Start: 2018-06-19

## 2018-06-22 RX ORDER — ATENOLOL 25 MG/1
25 TABLET ORAL DAILY
Qty: 90 | Refills: 1 | Status: ACTIVE | COMMUNITY

## 2018-06-22 RX ORDER — FUROSEMIDE 40 MG/1
40 TABLET ORAL
Refills: 0 | Status: DISCONTINUED | COMMUNITY
End: 2018-06-22

## 2018-06-22 RX ORDER — OXYCODONE HYDROCHLORIDE AND ACETAMINOPHEN 5; 325 MG/1; MG/1
5-325 TABLET ORAL
Refills: 0 | Status: DISCONTINUED | COMMUNITY
End: 2018-06-22

## 2018-06-22 RX ORDER — OMEPRAZOLE 20 MG/1
20 CAPSULE, DELAYED RELEASE ORAL
Refills: 0 | Status: DISCONTINUED | COMMUNITY
End: 2018-06-22

## 2018-06-22 RX ORDER — POTASSIUM CHLORIDE 1500 MG/1
20 TABLET, FILM COATED, EXTENDED RELEASE ORAL
Refills: 0 | Status: DISCONTINUED | COMMUNITY
End: 2018-06-22

## 2018-06-22 NOTE — ASSESSMENT
[FreeTextEntry1] : 78-year-old male with long history of hypertension, hyperlipidemia and type 2 diabetes stable post CABG.\par Patient is to continue present medications\par Blood work done to assess metabolic status\par Followup in 6 weeks

## 2018-06-22 NOTE — HISTORY OF PRESENT ILLNESS
[FreeTextEntry2] : Note from [facility] where patient presented to the ED on May 14, 2018 with chest pain with having had recent nuclear stress test which was positive.\par Cardiac enzymes were negative.\par \par The patient had a cardiac catheterization which showed triple vessel disease therefore CABG was scheduled.\par \par The patient had a CABG on May 17 with LIMA->LAD and SVG->PDA?OM.\par No perioperative complications.\par \par  postoperatively the patient had an episode of atrial fib treated with an amiodarone load and the patient converted to normal sinus rhythm. Amiodarone was DC'd.\par \par Hyperglycemia with endocrinology consultation and start of insulin.\par \par On 5/19 the patient again had rapid atrial fib with IV Cardizem and Lopressor.\par Amiodarone was restarted.\par The patient converted to normal sinus rhythm.\par \par On 5/20 the patient had hypotension with decreased dose of atenolol and given one unit packed red blood cells.\par \par The patient stabilized and was discharged on May 22, 2018. \par \par Since discharge the patient has felt relatively well without complaints of chest pain (other than postop pain), shortness of breath or palpitations.\par He has had slight swelling of his right leg where the venous harvest occurred.\par \par His only complaint is fatigue which has improved.\par \par He saw cardiology earlier this week without any changes.

## 2018-06-22 NOTE — PHYSICAL EXAM
[No Acute Distress] : no acute distress [Well-Appearing] : well-appearing [Normal Sclera/Conjunctiva] : normal sclera/conjunctiva [No JVD] : no jugular venous distention [No Respiratory Distress] : no respiratory distress  [Clear to Auscultation] : lungs were clear to auscultation bilaterally [Normal Rate] : normal rate  [Regular Rhythm] : with a regular rhythm [Soft] : abdomen soft [Non Tender] : non-tender [No Masses] : no abdominal mass palpated [No HSM] : no HSM [No Focal Deficits] : no focal deficits [Normal Affect] : the affect was normal [de-identified] : Slight edema right leg [de-identified] : Sternal wound well-healed.\par Right leg upper thigh and mid calf venous harvest wounds scabbed with slight surrounding erythema which is not increasing.

## 2018-06-23 LAB
ALBUMIN SERPL ELPH-MCNC: 4.1 G/DL
ALP BLD-CCNC: 100 U/L
ALT SERPL-CCNC: 29 U/L
ANION GAP SERPL CALC-SCNC: 18 MMOL/L
AST SERPL-CCNC: 18 U/L
BASOPHILS # BLD AUTO: 0.02 K/UL
BASOPHILS NFR BLD AUTO: 0.3 %
BILIRUB SERPL-MCNC: 0.4 MG/DL
BUN SERPL-MCNC: 25 MG/DL
CALCIUM SERPL-MCNC: 9.5 MG/DL
CHLORIDE SERPL-SCNC: 103 MMOL/L
CHOLEST SERPL-MCNC: 117 MG/DL
CHOLEST/HDLC SERPL: 3.3 RATIO
CO2 SERPL-SCNC: 22 MMOL/L
CREAT SERPL-MCNC: 1.08 MG/DL
EOSINOPHIL # BLD AUTO: 0.36 K/UL
EOSINOPHIL NFR BLD AUTO: 4.6 %
GLUCOSE SERPL-MCNC: 119 MG/DL
HBA1C MFR BLD HPLC: 6.3 %
HCT VFR BLD CALC: 36.8 %
HDLC SERPL-MCNC: 36 MG/DL
HGB BLD-MCNC: 11.9 G/DL
IMM GRANULOCYTES NFR BLD AUTO: 0.9 %
LDLC SERPL CALC-MCNC: 54 MG/DL
LYMPHOCYTES # BLD AUTO: 1.61 K/UL
LYMPHOCYTES NFR BLD AUTO: 20.6 %
MAGNESIUM SERPL-MCNC: 1.5 MG/DL
MAN DIFF?: NORMAL
MCHC RBC-ENTMCNC: 27.8 PG
MCHC RBC-ENTMCNC: 32.3 GM/DL
MCV RBC AUTO: 86 FL
MONOCYTES # BLD AUTO: 0.64 K/UL
MONOCYTES NFR BLD AUTO: 8.2 %
NEUTROPHILS # BLD AUTO: 5.12 K/UL
NEUTROPHILS NFR BLD AUTO: 65.4 %
PLATELET # BLD AUTO: 228 K/UL
POTASSIUM SERPL-SCNC: 4 MMOL/L
PROT SERPL-MCNC: 6.9 G/DL
RBC # BLD: 4.28 M/UL
RBC # FLD: 15.9 %
SODIUM SERPL-SCNC: 143 MMOL/L
TRIGL SERPL-MCNC: 135 MG/DL
URATE SERPL-MCNC: 4.4 MG/DL
WBC # FLD AUTO: 7.82 K/UL

## 2018-06-24 LAB — TSH SERPL-ACNC: 3.86 UIU/ML

## 2018-06-25 ENCOUNTER — RESULT REVIEW (OUTPATIENT)
Age: 78
End: 2018-06-25

## 2018-06-26 ENCOUNTER — APPOINTMENT (OUTPATIENT)
Dept: CARDIOTHORACIC SURGERY | Facility: CLINIC | Age: 78
End: 2018-06-26

## 2018-07-16 ENCOUNTER — APPOINTMENT (OUTPATIENT)
Dept: INTERNAL MEDICINE | Facility: CLINIC | Age: 78
End: 2018-07-16
Payer: MEDICARE

## 2018-07-16 VITALS
SYSTOLIC BLOOD PRESSURE: 125 MMHG | WEIGHT: 172 LBS | HEIGHT: 66.5 IN | HEART RATE: 68 BPM | DIASTOLIC BLOOD PRESSURE: 80 MMHG | BODY MASS INDEX: 27.32 KG/M2

## 2018-07-16 PROCEDURE — 69209 REMOVE IMPACTED EAR WAX UNI: CPT | Mod: LT

## 2018-07-16 PROCEDURE — 99213 OFFICE O/P EST LOW 20 MIN: CPT | Mod: 25

## 2018-07-16 NOTE — PHYSICAL EXAM
[No Acute Distress] : no acute distress [Normal Outer Ear/Nose] : the outer ears and nose were normal in appearance [Normal Oropharynx] : the oropharynx was normal [Normal Nasal Mucosa] : the nasal mucosa was normal [Supple] : supple [No Respiratory Distress] : no respiratory distress  [Clear to Auscultation] : lungs were clear to auscultation bilaterally [Normal Rate] : normal rate  [Regular Rhythm] : with a regular rhythm [Normal Affect] : the affect was normal [Normal Mood] : the mood was normal [de-identified] : left ear with cerumen impaction s/p Irrigation with excellent results, patient tolerated well. Right ear is normal

## 2018-07-16 NOTE — ASSESSMENT
[FreeTextEntry1] : Status post irrigation with excellent results, patient tolerated well. Patient to continue present medications and return to the office as scheduled for regular followup

## 2018-07-16 NOTE — HISTORY OF PRESENT ILLNESS
[FreeTextEntry8] : Patient presents complaining of decreased hearing of the left ear, he has history of cerumen impaction\par \par -Continues on atenolol for hypertension

## 2018-07-18 ENCOUNTER — RX RENEWAL (OUTPATIENT)
Age: 78
End: 2018-07-18

## 2018-08-03 ENCOUNTER — LABORATORY RESULT (OUTPATIENT)
Age: 78
End: 2018-08-03

## 2018-08-03 ENCOUNTER — APPOINTMENT (OUTPATIENT)
Dept: INTERNAL MEDICINE | Facility: CLINIC | Age: 78
End: 2018-08-03
Payer: MEDICARE

## 2018-08-03 VITALS
OXYGEN SATURATION: 98 % | DIASTOLIC BLOOD PRESSURE: 70 MMHG | WEIGHT: 173 LBS | BODY MASS INDEX: 27.47 KG/M2 | HEIGHT: 66.5 IN | SYSTOLIC BLOOD PRESSURE: 138 MMHG | RESPIRATION RATE: 16 BRPM | HEART RATE: 73 BPM

## 2018-08-03 PROBLEM — I10 ESSENTIAL (PRIMARY) HYPERTENSION: Chronic | Status: ACTIVE | Noted: 2018-05-15

## 2018-08-03 PROBLEM — K21.9 GASTRO-ESOPHAGEAL REFLUX DISEASE WITHOUT ESOPHAGITIS: Chronic | Status: ACTIVE | Noted: 2018-05-15

## 2018-08-03 PROBLEM — E78.5 HYPERLIPIDEMIA, UNSPECIFIED: Chronic | Status: ACTIVE | Noted: 2018-05-15

## 2018-08-03 PROBLEM — E11.9 TYPE 2 DIABETES MELLITUS WITHOUT COMPLICATIONS: Chronic | Status: ACTIVE | Noted: 2018-05-14

## 2018-08-03 PROBLEM — M10.9 GOUT, UNSPECIFIED: Chronic | Status: ACTIVE | Noted: 2018-05-15

## 2018-08-03 PROCEDURE — 36415 COLL VENOUS BLD VENIPUNCTURE: CPT

## 2018-08-03 PROCEDURE — 99214 OFFICE O/P EST MOD 30 MIN: CPT | Mod: 25

## 2018-08-03 RX ORDER — INSULIN GLARGINE 100 [IU]/ML
100 INJECTION, SOLUTION SUBCUTANEOUS DAILY
Refills: 0 | Status: DISCONTINUED | COMMUNITY
End: 2018-08-03

## 2018-08-03 RX ORDER — ASPIRIN 325 MG/1
325 TABLET ORAL
Refills: 1 | Status: DISCONTINUED | COMMUNITY
End: 2018-08-03

## 2018-08-03 RX ORDER — LINAGLIPTIN 5 MG/1
5 TABLET, FILM COATED ORAL DAILY
Refills: 1 | Status: DISCONTINUED | COMMUNITY
Start: 2018-08-03 | End: 2018-08-03

## 2018-08-03 RX ORDER — AMIODARONE HYDROCHLORIDE 100 MG/1
100 TABLET ORAL DAILY
Qty: 90 | Refills: 1 | Status: DISCONTINUED | COMMUNITY
End: 2018-08-03

## 2018-08-03 RX ORDER — ASPIRIN ENTERIC COATED TABLETS 81 MG 81 MG/1
81 TABLET, DELAYED RELEASE ORAL
Refills: 0 | Status: ACTIVE | COMMUNITY
Start: 2018-08-03

## 2018-08-03 NOTE — ASSESSMENT
[FreeTextEntry1] : 78-year-old male currently cardiologically stable about 3 months post CABG.\par Patient is to continue present medications.\par Followup with cardiology and endocrinology as scheduled.\par \par Tremor of hands likely benign essential tremor but must rule out early Parkinson's therefore referral to neurology.\par \par Progressed Dupuytren's contracture left hand therefore referred to orthopedic.\par \par Followup in 6 weeks with 3 month blood work.

## 2018-08-03 NOTE — HISTORY OF PRESENT ILLNESS
[FreeTextEntry1] : Post CABG with type 2 diabetes and hypertension [de-identified] : 78-year-old male who is status post CABG May 2017 with underlying history of hypertension, hyperlipidemia and type 2 diabetes presents for 6 week followup since his last visit.\par \par The patient is generally doing well and has followed up with cardiology and endocrinology.\par \par Cardiology discontinued amiodarone and low his aspirin to 81 mg daily.\par \par Endocrinology discontinued Lantus and started Toujeo with plans of hopefully changing patient to oral treatment.\par \par Overall he is feeling well without complaints including no chest pain, palpitations, shortness of breath or edema.\par \par He has 2 main complaints--\par - the patient has known Dupuytren's contractures of his left hand which have worsened and would like an orthopedic referral.\par \par -The patient also has had tremor of his hands which have worsened and now are affecting his daily activities such as ability to do things and hold things.

## 2018-08-04 LAB
ANION GAP SERPL CALC-SCNC: 19 MMOL/L
BASOPHILS # BLD AUTO: 0.05 K/UL
BASOPHILS NFR BLD AUTO: 0.6 %
BUN SERPL-MCNC: 23 MG/DL
CALCIUM SERPL-MCNC: 9.9 MG/DL
CHLORIDE SERPL-SCNC: 99 MMOL/L
CO2 SERPL-SCNC: 22 MMOL/L
CREAT SERPL-MCNC: 1.08 MG/DL
EOSINOPHIL # BLD AUTO: 0.39 K/UL
EOSINOPHIL NFR BLD AUTO: 5 %
GLUCOSE SERPL-MCNC: 145 MG/DL
HCT VFR BLD CALC: 44.3 %
HGB BLD-MCNC: 13.9 G/DL
IMM GRANULOCYTES NFR BLD AUTO: 0.4 %
LYMPHOCYTES # BLD AUTO: 2.1 K/UL
LYMPHOCYTES NFR BLD AUTO: 27.1 %
MAGNESIUM SERPL-MCNC: 1.7 MG/DL
MAN DIFF?: NORMAL
MCHC RBC-ENTMCNC: 26.9 PG
MCHC RBC-ENTMCNC: 31.4 GM/DL
MCV RBC AUTO: 85.9 FL
MONOCYTES # BLD AUTO: 0.66 K/UL
MONOCYTES NFR BLD AUTO: 8.5 %
NEUTROPHILS # BLD AUTO: 4.52 K/UL
NEUTROPHILS NFR BLD AUTO: 58.4 %
PLATELET # BLD AUTO: 189 K/UL
POTASSIUM SERPL-SCNC: 4.1 MMOL/L
RBC # BLD: 5.16 M/UL
RBC # FLD: 15.6 %
SODIUM SERPL-SCNC: 140 MMOL/L
WBC # FLD AUTO: 7.75 K/UL

## 2018-08-06 ENCOUNTER — RESULT REVIEW (OUTPATIENT)
Age: 78
End: 2018-08-06

## 2018-09-21 ENCOUNTER — LABORATORY RESULT (OUTPATIENT)
Age: 78
End: 2018-09-21

## 2018-09-21 ENCOUNTER — APPOINTMENT (OUTPATIENT)
Dept: INTERNAL MEDICINE | Facility: CLINIC | Age: 78
End: 2018-09-21
Payer: MEDICARE

## 2018-09-21 VITALS
HEIGHT: 66.5 IN | DIASTOLIC BLOOD PRESSURE: 80 MMHG | BODY MASS INDEX: 27.47 KG/M2 | WEIGHT: 173 LBS | SYSTOLIC BLOOD PRESSURE: 120 MMHG | HEART RATE: 66 BPM

## 2018-09-21 DIAGNOSIS — H61.22 IMPACTED CERUMEN, LEFT EAR: ICD-10-CM

## 2018-09-21 PROCEDURE — 99214 OFFICE O/P EST MOD 30 MIN: CPT | Mod: 25

## 2018-09-21 PROCEDURE — 36415 COLL VENOUS BLD VENIPUNCTURE: CPT

## 2018-09-21 RX ORDER — INSULIN LISPRO 100 [IU]/ML
100 INJECTION, SOLUTION INTRAVENOUS; SUBCUTANEOUS
Refills: 0 | Status: DISCONTINUED | COMMUNITY
End: 2018-09-21

## 2018-09-21 NOTE — HISTORY OF PRESENT ILLNESS
[FreeTextEntry1] : Patient presents for followup on hypertension/hyperlipidemia/type 2 diabetes. Patient is fasting for today's labs. Patient is currently on atenolol for his hypertension, on lipitor for his hyperlipidemia and is on Toujeo/metformin/tradjenta for his type 2 diabetes.\par -feels well without  complaints\par

## 2018-09-21 NOTE — ASSESSMENT
[FreeTextEntry1] : Labs will be sent out/ further recommendations will be made based on lab results. Patient advised to continue present medications with diet/exercise and specialist followup. Patient will return to the office in 3 months for CPE (on [facility] flu shot list)\par \par \par \par Shingrix d/ wpt\par last colonoscopy was July of 2012 with rec follow up in 3 years, overdue for followup but cardio will likely not clear-FIT test given\par 24-hour urine 12/17\par specialists include\par 1. Cardiology-Dr. Orosco 2/18\par 2. Ophthalmology-Dr. Santana-NO [facility] 8/2017-to sched followup\par 3. Rheumatology -Dr. Kleiner-declines followup\par 4.Endocrine-Dr. Veliz\par 5.Neuro for tremor "to do"\par 6.Hand MD for hand contracture "to do"\par declines podiatry\par HIV screen=declines\par Hepatitis C Screening 11/17 was negative\par

## 2018-09-24 ENCOUNTER — RESULT REVIEW (OUTPATIENT)
Age: 78
End: 2018-09-24

## 2018-09-24 LAB
ALBUMIN SERPL ELPH-MCNC: 4.5 G/DL
ALP BLD-CCNC: 57 U/L
ALT SERPL-CCNC: 15 U/L
ANION GAP SERPL CALC-SCNC: 15 MMOL/L
AST SERPL-CCNC: 16 U/L
BASOPHILS # BLD AUTO: 0.04 K/UL
BASOPHILS NFR BLD AUTO: 0.5 %
BILIRUB SERPL-MCNC: 0.4 MG/DL
BUN SERPL-MCNC: 26 MG/DL
CALCIUM SERPL-MCNC: 9.6 MG/DL
CHLORIDE SERPL-SCNC: 102 MMOL/L
CHOLEST SERPL-MCNC: 143 MG/DL
CHOLEST/HDLC SERPL: 4 RATIO
CO2 SERPL-SCNC: 25 MMOL/L
CREAT SERPL-MCNC: 0.96 MG/DL
EOSINOPHIL # BLD AUTO: 0.45 K/UL
EOSINOPHIL NFR BLD AUTO: 5.6 %
GLUCOSE SERPL-MCNC: 174 MG/DL
HBA1C MFR BLD HPLC: 7.4 %
HCT VFR BLD CALC: 42.5 %
HDLC SERPL-MCNC: 36 MG/DL
HGB BLD-MCNC: 13.7 G/DL
IMM GRANULOCYTES NFR BLD AUTO: 0.2 %
LDLC SERPL CALC-MCNC: 92 MG/DL
LYMPHOCYTES # BLD AUTO: 2.18 K/UL
LYMPHOCYTES NFR BLD AUTO: 27.2 %
MAGNESIUM SERPL-MCNC: 1.6 MG/DL
MAN DIFF?: NORMAL
MCHC RBC-ENTMCNC: 26.8 PG
MCHC RBC-ENTMCNC: 32.2 GM/DL
MCV RBC AUTO: 83 FL
MONOCYTES # BLD AUTO: 0.6 K/UL
MONOCYTES NFR BLD AUTO: 7.5 %
NEUTROPHILS # BLD AUTO: 4.73 K/UL
NEUTROPHILS NFR BLD AUTO: 59 %
PLATELET # BLD AUTO: 159 K/UL
POTASSIUM SERPL-SCNC: 4.1 MMOL/L
PROT SERPL-MCNC: 6.6 G/DL
RBC # BLD: 5.12 M/UL
RBC # FLD: 16.5 %
SODIUM SERPL-SCNC: 142 MMOL/L
TRIGL SERPL-MCNC: 77 MG/DL
URATE SERPL-MCNC: 5.2 MG/DL
WBC # FLD AUTO: 8.02 K/UL

## 2018-10-01 ENCOUNTER — RX RENEWAL (OUTPATIENT)
Age: 78
End: 2018-10-01

## 2018-10-16 ENCOUNTER — RX RENEWAL (OUTPATIENT)
Age: 78
End: 2018-10-16

## 2018-11-12 ENCOUNTER — RX RENEWAL (OUTPATIENT)
Age: 78
End: 2018-11-12

## 2018-12-09 NOTE — CONSULT NOTE ADULT - CONSULT REASON
Nursing Note by Jean López RN at 03/21/17 07:27 PM     Author:  Jean López RN Service:  (none) Author Type:  Registered Nurse     Filed:  03/21/17 07:30 PM Encounter Date:  3/21/2017 Status:  Signed     :  Jean López RN (Registered Nurse)            Per Dr. Terry Muse clean patient right 1st. Digit with warm water and once it's dry RN applied antibiotic ointment covered with non adhesive gauze and wrap with coban. Site is clean/dry. Patient denies pain. RN informed patient to keep dressing on for 24 hours and after that to keep it open to air.   Signs of infection discussed with patient and patient verbalized understanding.[CG1.1M]Electronically Signed by:    Jean López RN , 3/21/2017[CG1.2T]        Revision History        User Key Date/Time User Provider Type Action    > CG1.2 03/21/17 07:30 PM Jean López RN Registered Nurse Sign     CG1.1 03/21/17 07:27 PM Jean López RN Registered Nurse     M - Manual, T - Template CAD

## 2018-12-18 ENCOUNTER — RX RENEWAL (OUTPATIENT)
Age: 78
End: 2018-12-18

## 2019-01-29 ENCOUNTER — APPOINTMENT (OUTPATIENT)
Dept: INTERNAL MEDICINE | Facility: CLINIC | Age: 79
End: 2019-01-29
Payer: MEDICARE

## 2019-01-29 VITALS
SYSTOLIC BLOOD PRESSURE: 140 MMHG | HEART RATE: 86 BPM | WEIGHT: 181 LBS | HEIGHT: 66.5 IN | RESPIRATION RATE: 14 BRPM | DIASTOLIC BLOOD PRESSURE: 80 MMHG | BODY MASS INDEX: 28.75 KG/M2

## 2019-01-29 PROCEDURE — G0439: CPT

## 2019-01-29 PROCEDURE — 93000 ELECTROCARDIOGRAM COMPLETE: CPT

## 2019-01-29 NOTE — PHYSICAL EXAM
[General Appearance - Alert] : alert [General Appearance - In No Acute Distress] : in no acute distress [Sclera] : the sclera and conjunctiva were normal [PERRL With Normal Accommodation] : pupils were equal in size, round, and reactive to light [Extraocular Movements] : extraocular movements were intact [Outer Ear] : the ears and nose were normal in appearance [Oropharynx] : the oropharynx was normal [Neck Appearance] : the appearance of the neck was normal [Neck Cervical Mass (___cm)] : no neck mass was observed [Jugular Venous Distention Increased] : there was no jugular-venous distention [Thyroid Diffuse Enlargement] : the thyroid was not enlarged [Thyroid Nodule] : there were no palpable thyroid nodules [Auscultation Breath Sounds / Voice Sounds] : lungs were clear to auscultation bilaterally [Heart Rate And Rhythm] : heart rate was normal and rhythm regular [Heart Sounds] : normal S1 and S2 [Heart Sounds Gallop] : no gallops [Murmurs] : no murmurs [Heart Sounds Pericardial Friction Rub] : no pericardial rub [Arterial Pulses Carotid] : carotid pulses were normal with no bruits [Abdominal Aorta] : the abdominal aorta was normal [Full Pulse] : the pedal pulses are present [Edema] : there was no peripheral edema [Veins - Varicosity Changes] : there were no varicosital changes [Bowel Sounds] : normal bowel sounds [Abdomen Soft] : soft [Abdomen Tenderness] : non-tender [Abdomen Mass (___ Cm)] : no abdominal mass palpated [Cervical Lymph Nodes Enlarged Posterior Bilaterally] : posterior cervical [Cervical Lymph Nodes Enlarged Anterior Bilaterally] : anterior cervical [Supraclavicular Lymph Nodes Enlarged Bilaterally] : supraclavicular [Axillary Lymph Nodes Enlarged Bilaterally] : axillary [Femoral Lymph Nodes Enlarged Bilaterally] : femoral [Inguinal Lymph Nodes Enlarged Bilaterally] : inguinal [No Spinal Tenderness] : no spinal tenderness [Abnormal Walk] : normal gait [Nail Clubbing] : no clubbing  or cyanosis of the fingernails [Musculoskeletal - Swelling] : no joint swelling seen [Motor Tone] : muscle strength and tone were normal [Skin Color & Pigmentation] : normal skin color and pigmentation [Skin Turgor] : normal skin turgor [] : no rash [Right Foot Was Examined] : right foot was examined [Left Foot Was Examined] : left foot was examined [Normal Appearance] : normal in appearance [Normal in Appearance] : normal in appearance [Normal] : normal [2+] : 2+ in the dorsalis pedis [Deep Tendon Reflexes (DTR)] : deep tendon reflexes were 2+ and symmetric [Sensation] : the sensory exam was normal to light touch and pinprick [No Focal Deficits] : no focal deficits [Oriented To Time, Place, And Person] : oriented to person, place, and time [Impaired Insight] : insight and judgment were intact [Affect] : the affect was normal [FreeTextEntry1] : Significant contractures left hand and decreased range of motion left shoulder [Tenderness] : not tender [Erythema] : not erythematous [Swelling] : not swollen [#1 Diminished] : number 1 was normal [#2 Diminished] : number 2 was normal [#3 Diminished] : number 3 was normal [#4 Diminished] : number 4 was normal [#5 Diminished] : number 5 was normal [#6 Diminished] : number 6 was normal [#7 Diminished] : number 7 was normal [#8 Diminished] : number 8 was normal [#9 Diminished] : number 9 was normal [#10 Diminished] : number 10 was normal

## 2019-01-29 NOTE — HISTORY OF PRESENT ILLNESS
[FreeTextEntry1] : 78-year-old male with history of hypertension, hyperlipidemia and type 2 diabetes presents for his yearly physical.\par \par Significant issue the patient had this past year was May 2018 but the patient had an abnormal nuclear stress test with cardiac catheterization showing severe triple vessel disease and the patient had a CABG on May 17, 2018.\par Postoperatively he had atrial fibrillation but converted to sinus rhythm.\par He has been fine since.\par \par Patient on same medications without any changes.\par \par Generally feeling well without complaints of chest pain, palpitations, shortness of breath or edema.\par \par The patient's most significant complaints of arthritic and musculoskeletal multiple joint pains for which he saw rheumatology last year and does not want a followup.\par Diagnosis was osteoarthritis.\par Main complaints are increased left shoulder pain and worsening Dupuytren's contractures left hand\par \par Also he notes he occasionally feels numbness with pins and needles of his left third and fourth toes. This is a chronic symptom without any change

## 2019-01-29 NOTE — COUNSELING
[Healthy eating counseling provided] : healthy eating [Low Fat Diet] : Low fat diet [Decrease Portions] : Decrease food portions [Walking] : Walking [None] : None [Needs reinforcement, provided] : Patient needs reinforcement on understanding lifestyle changes and  the steps needed to achieve self management goals and reinforcement was provided

## 2019-01-29 NOTE — HEALTH RISK ASSESSMENT
[Good] : ~his/her~ current health as good [No falls in past year] : Patient reported no falls in the past year [0] : 2) Feeling down, depressed, or hopeless: Not at all (0) [None] : None [With Significant Other] : lives with significant other [High School] : high school [] :  [Sexually Active] : sexually active [Feels Safe at Home] : Feels safe at home [Fully functional (bathing, dressing, toileting, transferring, walking, feeding)] : Fully functional (bathing, dressing, toileting, transferring, walking, feeding) [Fully functional (using the telephone, shopping, preparing meals, housekeeping, doing laundry, using] : Fully functional and needs no help or supervision to perform IADLs (using the telephone, shopping, preparing meals, housekeeping, doing laundry, using transportation, managing medications and managing finances) [Smoke Detector] : smoke detector [Carbon Monoxide Detector] : carbon monoxide detector [Seat Belt] :  uses seat belt [Sunscreen] : uses sunscreen [Discussed at today's visit] : Advance Directives Discussed at today's visit [Excellent] : ~his/her~  mood as  excellent [Retired] : retired [# Of Children ___] : has [unfilled] children [Designated Healthcare Proxy] : Designated healthcare proxy [Name: ___] : Health Care Proxy's Name: [unfilled]  [] : No [[id number]] : 0 [Change in mental status noted] : No change in mental status noted [Reports changes in hearing] : Reports no changes in hearing [Reports changes in vision] : Reports no changes in vision [Reports changes in dental health] : Reports no changes in dental health [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

## 2019-01-29 NOTE — ASSESSMENT
[FreeTextEntry1] : 78-year-old male with long-standing hypertension, hyperlipidemia and type 2 diabetes is now status post CABG May 2018.\par \par Patient currently clinically stable but continues to need lifestyle changes therefore encouraged diet, exercise and weight loss.\par \par Continue present medications.\par \par Orthopedic referral concerning left hand and left shoulder\par \par Cardiology Followup as scheduled\par \par Colonoscopy July 2012.. Followup in 3 years and again told patient he is overdue for colonoscopy. Told needs to clear with cardiology\par Endoscopy July 2012\par Ophthalmology Up to date\par Again recommend podiatry evaluation\par \par Vaccines up-to-date.\par Discussed Shingrix\par \par Follow up in 3-4 months

## 2019-01-29 NOTE — CURRENT MEDS
Telephone Encounter by Misty Trammell NCMA at 06/29/18 03:29 PM     Author:  Misty Trammell NCMA Service:  (none) Author Type:  Certified Medical Assistant     Filed:  06/29/18 03:31 PM Encounter Date:  6/29/2018 Status:  Signed     :  Misty Trammell NCMA (Certified Medical Assistant)            Left message on machine at preferred number to have patient call the office back.[SB1.1T]  Please also see result notes[SB1.1M]      Revision History        User Key Date/Time User Provider Type Action    > SB1.1 06/29/18 03:31 PM Misty Trammell NCMA Certified Medical Assistant Sign    M - Manual, T - Template             [Takes medication as prescribed] : takes [None] : Patient does not have any barriers to medication adherence

## 2019-01-30 LAB
ALBUMIN SERPL ELPH-MCNC: 4.4 G/DL
ALP BLD-CCNC: 64 U/L
ALT SERPL-CCNC: 17 U/L
ANION GAP SERPL CALC-SCNC: 17 MMOL/L
APPEARANCE: CLEAR
AST SERPL-CCNC: 15 U/L
BACTERIA: NEGATIVE
BASOPHILS # BLD AUTO: 0.03 K/UL
BASOPHILS NFR BLD AUTO: 0.3 %
BILIRUB SERPL-MCNC: 0.4 MG/DL
BILIRUBIN URINE: NEGATIVE
BLOOD URINE: ABNORMAL
BUN SERPL-MCNC: 31 MG/DL
CALCIUM SERPL-MCNC: 9.7 MG/DL
CHLORIDE SERPL-SCNC: 104 MMOL/L
CHOLEST SERPL-MCNC: 131 MG/DL
CHOLEST/HDLC SERPL: 3.7 RATIO
CO2 SERPL-SCNC: 22 MMOL/L
COLOR: YELLOW
CREAT SERPL-MCNC: 0.97 MG/DL
CREAT SPEC-SCNC: 110 MG/DL
EOSINOPHIL # BLD AUTO: 0.57 K/UL
EOSINOPHIL NFR BLD AUTO: 5.5 %
GLUCOSE QUALITATIVE U: NEGATIVE MG/DL
GLUCOSE SERPL-MCNC: 150 MG/DL
HBA1C MFR BLD HPLC: 8.5 %
HCT VFR BLD CALC: 46.2 %
HDLC SERPL-MCNC: 35 MG/DL
HGB BLD-MCNC: 15.5 G/DL
IMM GRANULOCYTES NFR BLD AUTO: 0.3 %
KETONES URINE: NEGATIVE
LDLC SERPL CALC-MCNC: 62 MG/DL
LEUKOCYTE ESTERASE URINE: NEGATIVE
LYMPHOCYTES # BLD AUTO: 2.42 K/UL
LYMPHOCYTES NFR BLD AUTO: 23.5 %
MAGNESIUM SERPL-MCNC: 1.8 MG/DL
MAN DIFF?: NORMAL
MCHC RBC-ENTMCNC: 28.6 PG
MCHC RBC-ENTMCNC: 33.5 GM/DL
MCV RBC AUTO: 85.2 FL
MICROALBUMIN 24H UR DL<=1MG/L-MCNC: 11.5 MG/DL
MICROALBUMIN/CREAT 24H UR-RTO: 105 MG/G
MICROSCOPIC-UA: NORMAL
MONOCYTES # BLD AUTO: 0.79 K/UL
MONOCYTES NFR BLD AUTO: 7.7 %
NEUTROPHILS # BLD AUTO: 6.46 K/UL
NEUTROPHILS NFR BLD AUTO: 62.7 %
NITRITE URINE: NEGATIVE
PH URINE: 5
PLATELET # BLD AUTO: 171 K/UL
POTASSIUM SERPL-SCNC: 4.1 MMOL/L
PROT SERPL-MCNC: 7.1 G/DL
PROTEIN URINE: ABNORMAL MG/DL
PSA SERPL-MCNC: 2.46 NG/ML
RBC # BLD: 5.42 M/UL
RBC # FLD: 14.6 %
RED BLOOD CELLS URINE: 8 /HPF
SODIUM SERPL-SCNC: 143 MMOL/L
SPECIFIC GRAVITY URINE: 1.03
SQUAMOUS EPITHELIAL CELLS: 0 /HPF
TRIGL SERPL-MCNC: 170 MG/DL
URATE SERPL-MCNC: 4.8 MG/DL
UROBILINOGEN URINE: NEGATIVE MG/DL
WBC # FLD AUTO: 10.3 K/UL
WHITE BLOOD CELLS URINE: 1 /HPF

## 2019-01-31 ENCOUNTER — RESULT REVIEW (OUTPATIENT)
Age: 79
End: 2019-01-31

## 2019-04-16 ENCOUNTER — RX RENEWAL (OUTPATIENT)
Age: 79
End: 2019-04-16

## 2019-04-16 NOTE — PROGRESS NOTE ADULT - SUBJECTIVE AND OBJECTIVE BOX
Refill for tamsulosin denied as it appears pt is on alternate therapy. Subjective: Pt resting in bed.      Tele:                          T(F): 99 (18 @ 05:42), Max: 99.9 (18 @ 20:24)  HR: 69 (18 @ 10:00) (60 - 154)  BP: 100/48 (18 @ 10:00) (80/48 - 133/74)  RR: 18 (18 @ 08:26) (16 - 19)  SpO2: 95% (18 @ 08:26) (95% - 99%)    Daily     Daily Weight in k.9 (20 May 2018 01:00)    LV EF:    No Known Allergies          135  |  100  |  30.0<H>  ----------------------------<  210<H>  4.7   |  21.0<L>  |  0.87    Ca    7.9<L>      20 May 2018 05:39  Phos  1.9       Mg     2.3                                      9.0    10.4  )-----------( 153      ( 20 May 2018 05:40 )             27.2               CAPILLARY BLOOD GLUCOSE      POCT Blood Glucose.: 258 mg/dL (20 May 2018 08:14)  POCT Blood Glucose.: 206 mg/dL (19 May 2018 22:41)  POCT Blood Glucose.: 283 mg/dL (19 May 2018 16:33)  POCT Blood Glucose.: 254 mg/dL (19 May 2018 12:20)           CXR:    I&O's Detail    19 May 2018 07:01  -  20 May 2018 07:00  --------------------------------------------------------  IN:  Total IN: 0 mL    OUT:    Voided: 150 mL  Total OUT: 150 mL    Total NET: -150 mL          CHEST TUBE:  [ ] YES [ ] NO  OUTPUT:     per 24 hours    AIR LEAKS:  [ ] YES [ ] NO      IVCKIE DRAIN:   [ ] YES [ ] NO  OUTPUT:     per 24 hours    EPICARDIAL WIRES:  [ ] YES [ ] NO      BOWEL MOVEMENT:  [ ] YES [ ] NO        Active Medications:  allopurinol 100 milliGRAM(s) Oral two times a day  amiodarone    Tablet 400 milliGRAM(s) Oral every 8 hours  aspirin enteric coated 325 milliGRAM(s) Oral daily  ATENolol  Tablet 50 milliGRAM(s) Oral two times a day  atorvastatin 40 milliGRAM(s) Oral at bedtime  docusate sodium 100 milliGRAM(s) Oral three times a day  enoxaparin Injectable 40 milliGRAM(s) SubCutaneous daily  furosemide    Tablet 40 milliGRAM(s) Oral daily  insulin glargine Injectable (LANTUS) 30 Unit(s) SubCutaneous at bedtime  insulin lispro (HumaLOG) corrective regimen sliding scale   SubCutaneous Before meals and at bedtime  insulin lispro Injectable (HumaLOG) 10 Unit(s) SubCutaneous three times a day before meals  metFORMIN 1000 milliGRAM(s) Oral two times a day  mupirocin 2% Ointment 1 Application(s) Topical two times a day  ondansetron Injectable 4 milliGRAM(s) IV Push every 6 hours PRN  oxyCODONE    5 mG/acetaminophen 325 mG 1 Tablet(s) Oral every 6 hours PRN  oxyCODONE    5 mG/acetaminophen 325 mG 2 Tablet(s) Oral every 6 hours PRN  pantoprazole    Tablet 40 milliGRAM(s) Oral daily  polyethylene glycol 3350 17 Gram(s) Oral daily PRN  potassium chloride   Powder 20 milliEquivalent(s) Oral daily  senna 2 Tablet(s) Oral at bedtime  sodium chloride 0.9% lock flush 3 milliLiter(s) IV Push every 8 hours      Physical Exam:    Neuro:     Pulm:     CV:     Abd:     Extremities:    Incision(s):     Assessment:  78y Male                       PAST MEDICAL & SURGICAL HISTORY:  GERD (gastroesophageal reflux disease)  Gout  HLD (hyperlipidemia)  HTN (hypertension)  DM (diabetes mellitus)  No significant past surgical history        Plan: Subjective: Pt resting in bed.  Denies CP or SOB.  NAD noted.  Asymptomatic Hypotension this AM spb 80's.  Received 250ml of Albumin.        Tele:   Converted to SR 60's currently.  Overnight KATHY 120-150.                       T(F): 99 (18 @ 05:42), Max: 99.9 (18 @ 20:24)  HR: 69 (18 @ 10:00) (60 - 154)  BP: 100/48 (18 @ 10:00) (80/48 - 133/74)  RR: 18 (18 @ 08:26) (16 - 19)  SpO2: 95% (18 @ 08:26) (95% - 99%) on room air at rest.    Daily     Daily Weight in k.9 (20 May 2018 01:00)    LV EF: nml    No Known Allergies          135  |  100  |  30.0<H>  ----------------------------<  210<H>  4.7   |  21.0<L>  |  0.87    Ca    7.9<L>      20 May 2018 05:39  Phos  1.9       Mg     2.3                                      9.0    10.4  )-----------( 153      ( 20 May 2018 05:40 )             27.2               CAPILLARY BLOOD GLUCOSE      POCT Blood Glucose.: 258 mg/dL (20 May 2018 08:14)  POCT Blood Glucose.: 206 mg/dL (19 May 2018 22:41)  POCT Blood Glucose.: 283 mg/dL (19 May 2018 16:33)  POCT Blood Glucose.: 254 mg/dL (19 May 2018 12:20)           CXR: < from: Xray Chest 1 View AP/PA. (18 @ 06:44) >  Impression:  Resolution of right basilar atelectasis. Decreasing left basilar   atelectasis and decreasing left pleural effusion since the prior day.    PAULY FAUSTIN M.D., ATTENDING RADIOLOGIST  This document has been electronically signed. May 20 2018  9:36AM      < end of copied text >      I&O's Detail    19 May 2018 07:01  -  20 May 2018 07:00  --------------------------------------------------------  IN:  Total IN: 0 mL    OUT:    Voided: 150 mL  Total OUT: 150 mL    Total NET: -150 mL        CHEST TUBE:  [ ] YES [ x] NO  OUTPUT:     per 24 hours    AIR LEAKS:  [ ] YES [ ] NO      VICKIE DRAIN:   [ ] YES [x ] NO  OUTPUT:     per 24 hours    EPICARDIAL WIRES:  [x ] YES [ ] NO      BOWEL MOVEMENT:  [x ] YES [ ] NO        Active Medications:  allopurinol 100 milliGRAM(s) Oral two times a day  amiodarone    Tablet 400 milliGRAM(s) Oral every 8 hours  aspirin enteric coated 325 milliGRAM(s) Oral daily  ATENolol  Tablet 50 milliGRAM(s) Oral two times a day  atorvastatin 40 milliGRAM(s) Oral at bedtime  docusate sodium 100 milliGRAM(s) Oral three times a day  enoxaparin Injectable 40 milliGRAM(s) SubCutaneous daily  furosemide    Tablet 40 milliGRAM(s) Oral daily  insulin glargine Injectable (LANTUS) 30 Unit(s) SubCutaneous at bedtime  insulin lispro (HumaLOG) corrective regimen sliding scale   SubCutaneous Before meals and at bedtime  insulin lispro Injectable (HumaLOG) 10 Unit(s) SubCutaneous three times a day before meals  metFORMIN 1000 milliGRAM(s) Oral two times a day  mupirocin 2% Ointment 1 Application(s) Topical two times a day  ondansetron Injectable 4 milliGRAM(s) IV Push every 6 hours PRN  oxyCODONE    5 mG/acetaminophen 325 mG 1 Tablet(s) Oral every 6 hours PRN  oxyCODONE    5 mG/acetaminophen 325 mG 2 Tablet(s) Oral every 6 hours PRN  pantoprazole    Tablet 40 milliGRAM(s) Oral daily  polyethylene glycol 3350 17 Gram(s) Oral daily PRN  potassium chloride   Powder 20 milliEquivalent(s) Oral daily  senna 2 Tablet(s) Oral at bedtime  sodium chloride 0.9% lock flush 3 milliLiter(s) IV Push every 8 hours      Physical Exam:    Neuro: AAOX3.  No focal deficits.    Pulm: Diminished BLL.      CV: RRR.  +S1+S2.    Abd: Soft/NT/ND.  +BS.  +BM .    Extremities: Trace edema BLE R>L.  +pp.    Incision(s): MSI with mepilex dsg c/d/i.  Sternum stable.  +PW to EPM VVI backup.  CT sites with DSD c/d/i.  RLE EVH lower leg site with mepilex c/d/i.  Upper leg site with ecchymosis and mepilex with small amount unchanged bloody drainage.               PAST MEDICAL & SURGICAL HISTORY:  GERD (gastroesophageal reflux disease)  Gout  HLD (hyperlipidemia)  HTN (hypertension)  DM (diabetes mellitus)  No significant past surgical history

## 2019-04-29 ENCOUNTER — RX RENEWAL (OUTPATIENT)
Age: 79
End: 2019-04-29

## 2019-04-29 ENCOUNTER — APPOINTMENT (OUTPATIENT)
Dept: INTERNAL MEDICINE | Facility: CLINIC | Age: 79
End: 2019-04-29

## 2019-05-06 ENCOUNTER — APPOINTMENT (OUTPATIENT)
Dept: ORTHOPEDIC SURGERY | Facility: CLINIC | Age: 79
End: 2019-05-06
Payer: MEDICARE

## 2019-05-06 VITALS
BODY MASS INDEX: 28.75 KG/M2 | HEIGHT: 66.5 IN | SYSTOLIC BLOOD PRESSURE: 164 MMHG | DIASTOLIC BLOOD PRESSURE: 89 MMHG | HEART RATE: 79 BPM | WEIGHT: 181 LBS

## 2019-05-06 PROCEDURE — 99204 OFFICE O/P NEW MOD 45 MIN: CPT

## 2019-06-19 ENCOUNTER — APPOINTMENT (OUTPATIENT)
Dept: ORTHOPEDIC SURGERY | Facility: CLINIC | Age: 79
End: 2019-06-19
Payer: MEDICARE

## 2019-06-19 VITALS
DIASTOLIC BLOOD PRESSURE: 92 MMHG | BODY MASS INDEX: 28.75 KG/M2 | WEIGHT: 181 LBS | SYSTOLIC BLOOD PRESSURE: 175 MMHG | HEIGHT: 66.5 IN | HEART RATE: 76 BPM

## 2019-06-19 PROCEDURE — 99214 OFFICE O/P EST MOD 30 MIN: CPT | Mod: 25

## 2019-06-19 PROCEDURE — 26040 RELEASE PALM CONTRACTURE: CPT | Mod: F8,59

## 2019-06-26 ENCOUNTER — APPOINTMENT (OUTPATIENT)
Dept: ORTHOPEDIC SURGERY | Facility: CLINIC | Age: 79
End: 2019-06-26
Payer: MEDICARE

## 2019-06-26 VITALS
BODY MASS INDEX: 28.75 KG/M2 | WEIGHT: 181 LBS | HEIGHT: 66.5 IN | DIASTOLIC BLOOD PRESSURE: 82 MMHG | HEART RATE: 78 BPM | SYSTOLIC BLOOD PRESSURE: 161 MMHG

## 2019-06-26 DIAGNOSIS — M72.0 PALMAR FASCIAL FIBROMATOSIS [DUPUYTREN]: ICD-10-CM

## 2019-06-26 PROCEDURE — 99024 POSTOP FOLLOW-UP VISIT: CPT

## 2019-06-28 ENCOUNTER — APPOINTMENT (OUTPATIENT)
Dept: ORTHOPEDIC SURGERY | Facility: CLINIC | Age: 79
End: 2019-06-28
Payer: MEDICARE

## 2019-06-28 PROCEDURE — 99214 OFFICE O/P EST MOD 30 MIN: CPT | Mod: 24,25

## 2019-06-28 PROCEDURE — 20610 DRAIN/INJ JOINT/BURSA W/O US: CPT | Mod: LT,79

## 2019-06-28 PROCEDURE — 73030 X-RAY EXAM OF SHOULDER: CPT | Mod: LT

## 2019-07-15 NOTE — PROGRESS NOTE ADULT - PROBLEM SELECTOR PLAN 7
A1C 8.%  fingerstick improved  Aggressive diabetic teaching  Insulin administration / testing initiated( Pt never tested or administered PTA)  maintain Lantus to 35 units.   Premeal Lispro to 12 units.  Continue Metformin and DURGA AC/HS.  Endocrine following.
A1C 8.%  fingerstick improved, though above target  Aggressive diabetic teaching  Insulin administration / testing initiated( Pt never tested or administered PTA)  maintain Lantus to 35 units.   Premeal Lispro to 12 units.  Continue Metformin and DURGA AC/HS.  Endocrine following.
A1C 8.  's over the last 24 hours with a fasting AM glucose of 210.  Increase Lantus to 35 units.  Increase Premeal Lispro to 12 units.  Continue Metformin and DURGA AC/HS.  Endocrine following.
As above
2-4 times/mo

## 2019-08-23 ENCOUNTER — APPOINTMENT (OUTPATIENT)
Dept: ORTHOPEDIC SURGERY | Facility: CLINIC | Age: 79
End: 2019-08-23
Payer: MEDICARE

## 2019-08-23 VITALS
SYSTOLIC BLOOD PRESSURE: 188 MMHG | WEIGHT: 181 LBS | HEART RATE: 70 BPM | DIASTOLIC BLOOD PRESSURE: 93 MMHG | BODY MASS INDEX: 28.75 KG/M2 | HEIGHT: 66.5 IN

## 2019-08-23 PROCEDURE — 99213 OFFICE O/P EST LOW 20 MIN: CPT | Mod: 24

## 2019-09-20 ENCOUNTER — APPOINTMENT (OUTPATIENT)
Dept: ORTHOPEDIC SURGERY | Facility: CLINIC | Age: 79
End: 2019-09-20
Payer: MEDICARE

## 2019-09-20 VITALS
DIASTOLIC BLOOD PRESSURE: 81 MMHG | BODY MASS INDEX: 28.75 KG/M2 | HEART RATE: 74 BPM | SYSTOLIC BLOOD PRESSURE: 156 MMHG | HEIGHT: 66.5 IN | WEIGHT: 181 LBS

## 2019-09-20 DIAGNOSIS — M75.32 CALCIFIC TENDINITIS OF LEFT SHOULDER: ICD-10-CM

## 2019-09-20 PROCEDURE — 20610 DRAIN/INJ JOINT/BURSA W/O US: CPT | Mod: LT

## 2019-09-20 PROCEDURE — 99214 OFFICE O/P EST MOD 30 MIN: CPT | Mod: 25

## 2019-12-02 ENCOUNTER — RX RENEWAL (OUTPATIENT)
Age: 79
End: 2019-12-02

## 2019-12-27 ENCOUNTER — RX RENEWAL (OUTPATIENT)
Age: 79
End: 2019-12-27

## 2020-01-07 NOTE — DISCHARGE NOTE ADULT - PROVIDER RX CONTACT NUMBER
[phone] Albendazole Counseling:  I discussed with the patient the risks of albendazole including but not limited to cytopenia, kidney damage, nausea/vomiting and severe allergy.  The patient understands that this medication is being used in an off-label manner.

## 2020-01-09 ENCOUNTER — RX RENEWAL (OUTPATIENT)
Age: 80
End: 2020-01-09

## 2020-01-31 RX ORDER — PEN NEEDLE, DIABETIC 29 G X1/2"
32G X 4 MM NEEDLE, DISPOSABLE MISCELLANEOUS
Qty: 120 | Refills: 0 | Status: ACTIVE | COMMUNITY
Start: 2018-06-18

## 2020-02-20 ENCOUNTER — APPOINTMENT (OUTPATIENT)
Dept: INTERNAL MEDICINE | Facility: CLINIC | Age: 80
End: 2020-02-20
Payer: MEDICARE

## 2020-02-20 VITALS
RESPIRATION RATE: 16 BRPM | DIASTOLIC BLOOD PRESSURE: 80 MMHG | BODY MASS INDEX: 29.06 KG/M2 | HEART RATE: 86 BPM | WEIGHT: 183 LBS | HEIGHT: 66.5 IN | SYSTOLIC BLOOD PRESSURE: 125 MMHG

## 2020-02-20 DIAGNOSIS — R80.9 PROTEINURIA, UNSPECIFIED: ICD-10-CM

## 2020-02-20 DIAGNOSIS — R25.1 TREMOR, UNSPECIFIED: ICD-10-CM

## 2020-02-20 PROCEDURE — G0442 ANNUAL ALCOHOL SCREEN 15 MIN: CPT

## 2020-02-20 PROCEDURE — G0439: CPT

## 2020-02-20 PROCEDURE — 36415 COLL VENOUS BLD VENIPUNCTURE: CPT

## 2020-02-20 RX ORDER — LINAGLIPTIN 5 MG/1
5 TABLET, FILM COATED ORAL DAILY
Refills: 1 | Status: DISCONTINUED | COMMUNITY
Start: 2018-08-03 | End: 2020-02-20

## 2020-02-20 RX ORDER — FLUTICASONE PROPIONATE 50 UG/1
50 SPRAY, METERED NASAL
Qty: 1 | Refills: 5 | Status: DISCONTINUED | COMMUNITY
Start: 2018-03-09 | End: 2020-02-20

## 2020-02-20 RX ORDER — INSULIN GLARGINE 300 U/ML
300 INJECTION, SOLUTION SUBCUTANEOUS
Refills: 0 | Status: DISCONTINUED | COMMUNITY
Start: 2018-08-03 | End: 2020-02-20

## 2020-02-20 RX ORDER — NIFEDIPINE 30 MG/1
30 TABLET, EXTENDED RELEASE ORAL DAILY
Qty: 90 | Refills: 1 | Status: ACTIVE | COMMUNITY
Start: 2019-11-21

## 2020-02-20 RX ORDER — CHOLECALCIFEROL (VITAMIN D3) 50 MCG
50 MCG TABLET ORAL
Refills: 0 | Status: ACTIVE | COMMUNITY
Start: 2020-02-20

## 2020-02-20 NOTE — HEALTH RISK ASSESSMENT
[Good] : ~his/her~ current health as good [Excellent] : ~his/her~  mood as  excellent [No falls in past year] : Patient reported no falls in the past year [0] : 2) Feeling down, depressed, or hopeless: Not at all (0) [None] : None [With Significant Other] : lives with significant other [Retired] : retired [High School] : high school [] :  [# Of Children ___] : has [unfilled] children [Sexually Active] : sexually active [Feels Safe at Home] : Feels safe at home [Fully functional (bathing, dressing, toileting, transferring, walking, feeding)] : Fully functional (bathing, dressing, toileting, transferring, walking, feeding) [Fully functional (using the telephone, shopping, preparing meals, housekeeping, doing laundry, using] : Fully functional and needs no help or supervision to perform IADLs (using the telephone, shopping, preparing meals, housekeeping, doing laundry, using transportation, managing medications and managing finances) [Smoke Detector] : smoke detector [Carbon Monoxide Detector] : carbon monoxide detector [Seat Belt] :  uses seat belt [Sunscreen] : uses sunscreen [Designated Healthcare Proxy] : Designated healthcare proxy [Discussed at today's visit] : Advance Directives Discussed at today's visit [Name: ___] : Health Care Proxy's Name: [unfilled]  [No] : No [Reviewed no changes] : Reviewed no changes [Audit-CScore] : 0 [] : No [de-identified] : regular walking [de-identified] : fairly good [UKC2Mkmcv] : 0 [Change in mental status noted] : No change in mental status noted [Reports changes in hearing] : Reports no changes in hearing [Reports changes in vision] : Reports no changes in vision [Reports changes in dental health] : Reports no changes in dental health [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

## 2020-02-20 NOTE — ASSESSMENT
[FreeTextEntry1] : 79-year-old male with long-standing hypertension, hyperlipidemia and type 2 diabetes is now status post CABG May 2018.\par \par Patient currently cardiologically stable\par \par Patient now follows with endocrinology with fair control of diabetes with last hemoglobin A1c October 2019 =7.6.\par Hopeful improved control.\par \par Patient currently clinically stable but continues to need lifestyle changes therefore encouraged diet, exercise and weight loss.\par \par Continue present medications.\par \par Cardiology and endocrinology followup as scheduled\par \par Colonoscopy July 2012.. Followup in 3 years and again told patient he is overdue for colonoscopy. Told needs to clear with cardiology\par Endoscopy July 2012\par Ophthalmology Up to date\par Again recommend podiatry evaluation\par \par Vaccines up-to-date.\par Discussed Shingrix\par \par Venipuncture done today in the office\par \par Followup in 6 months with patient seeing endocrinology about every 3 months

## 2020-02-20 NOTE — COUNSELING
[Healthy eating counseling provided] : healthy eating [Low Fat Diet] : Low fat diet [Decrease Portions] : Decrease food portions [Walking] : Walking [None] : None [Needs reinforcement, provided] : Patient needs reinforcement on understanding lifestyle changes and  the steps needed to achieve self management goals and reinforcement was provided [Potential consequences of obesity discussed] : Potential consequences of obesity discussed [Benefits of weight loss discussed] : Benefits of weight loss discussed [Structured Weight Management Program suggested:] : Structured weight management program suggested [Encouraged to maintain food diary] : Encouraged to maintain food diary [Encouraged to increase physical activity] : Encouraged to increase physical activity

## 2020-02-20 NOTE — HISTORY OF PRESENT ILLNESS
[FreeTextEntry1] : 79-year-old male with history of hypertension, hyperlipidemia and type 2 diabetes presents for his yearly physical.\par the patient has not been seen for one year essentially declining any followup since he has been following with endocrinology about every 3 months.\par \par Significant issue is in May 2018 the patient had an abnormal nuclear stress test with cardiac catheterization showing severe triple vessel disease and the patient had a CABG on May 17, 2018.\par Postoperatively he had atrial fibrillation but converted to sinus rhythm.\par He has been fine since.\par Echo December 2019 showed normal LVEF with no valvular issues.\par Carotid duplex December 2019 showing plaque without stenosis.\par \par medications as listed\par \par Generally feeling well without complaints of chest pain, palpitations, shortness of breath or edema.\par \par History of arthritic and musculoskeletal multiple joint pains for which he saw rheumatology 2017 and does not want a followup.\par Diagnosis was osteoarthritis.\par Main complaints are increased left shoulder pain and worsening Dupuytren's contractures left hand.\par he had a Dupuytren's contracture corrected would benefit a receive shot into the left shoulder with benefit.\par \par He does have a mild hand tremor which he states has been stable with no worsening.

## 2020-02-20 NOTE — PHYSICAL EXAM
[General Appearance - Alert] : alert [General Appearance - In No Acute Distress] : in no acute distress [Sclera] : the sclera and conjunctiva were normal [PERRL With Normal Accommodation] : pupils were equal in size, round, and reactive to light [Extraocular Movements] : extraocular movements were intact [Outer Ear] : the ears and nose were normal in appearance [Oropharynx] : the oropharynx was normal [Neck Appearance] : the appearance of the neck was normal [Neck Cervical Mass (___cm)] : no neck mass was observed [Jugular Venous Distention Increased] : there was no jugular-venous distention [Thyroid Diffuse Enlargement] : the thyroid was not enlarged [Thyroid Nodule] : there were no palpable thyroid nodules [Auscultation Breath Sounds / Voice Sounds] : lungs were clear to auscultation bilaterally [Heart Rate And Rhythm] : heart rate was normal and rhythm regular [Heart Sounds] : normal S1 and S2 [Heart Sounds Gallop] : no gallops [Murmurs] : no murmurs [Heart Sounds Pericardial Friction Rub] : no pericardial rub [Arterial Pulses Carotid] : carotid pulses were normal with no bruits [Abdominal Aorta] : the abdominal aorta was normal [Full Pulse] : the pedal pulses are present [Edema] : there was no peripheral edema [Bowel Sounds] : normal bowel sounds [Veins - Varicosity Changes] : there were no varicosital changes [Abdomen Soft] : soft [Abdomen Tenderness] : non-tender [Abdomen Mass (___ Cm)] : no abdominal mass palpated [Cervical Lymph Nodes Enlarged Posterior Bilaterally] : posterior cervical [Cervical Lymph Nodes Enlarged Anterior Bilaterally] : anterior cervical [Supraclavicular Lymph Nodes Enlarged Bilaterally] : supraclavicular [Axillary Lymph Nodes Enlarged Bilaterally] : axillary [Femoral Lymph Nodes Enlarged Bilaterally] : femoral [Inguinal Lymph Nodes Enlarged Bilaterally] : inguinal [No Spinal Tenderness] : no spinal tenderness [Abnormal Walk] : normal gait [Nail Clubbing] : no clubbing  or cyanosis of the fingernails [Musculoskeletal - Swelling] : no joint swelling seen [Motor Tone] : muscle strength and tone were normal [Skin Color & Pigmentation] : normal skin color and pigmentation [Skin Turgor] : normal skin turgor [] : no rash [Right Foot Was Examined] : right foot was examined [Left Foot Was Examined] : left foot was examined [Normal Appearance] : normal in appearance [Normal in Appearance] : normal in appearance [Normal] : normal [2+] : 2+ in the dorsalis pedis [Deep Tendon Reflexes (DTR)] : deep tendon reflexes were 2+ and symmetric [Sensation] : the sensory exam was normal to light touch and pinprick [No Focal Deficits] : no focal deficits [Oriented To Time, Place, And Person] : oriented to person, place, and time [Impaired Insight] : insight and judgment were intact [Affect] : the affect was normal [FreeTextEntry1] : Significant contractures left hand and decreased range of motion left shoulder [Tenderness] : not tender [Erythema] : not erythematous [Swelling] : not swollen [#1 Diminished] : number 1 was normal [#2 Diminished] : number 2 was normal [#3 Diminished] : number 3 was normal [#4 Diminished] : number 4 was normal [#5 Diminished] : number 5 was normal [#6 Diminished] : number 6 was normal [#7 Diminished] : number 7 was normal [#8 Diminished] : number 8 was normal [#9 Diminished] : number 9 was normal [#10 Diminished] : number 10 was normal

## 2020-02-21 LAB
ALBUMIN SERPL ELPH-MCNC: 4.6 G/DL
ALP BLD-CCNC: 70 U/L
ALT SERPL-CCNC: 20 U/L
ANION GAP SERPL CALC-SCNC: 15 MMOL/L
APPEARANCE: CLEAR
AST SERPL-CCNC: 20 U/L
BACTERIA: NEGATIVE
BASOPHILS # BLD AUTO: 0.06 K/UL
BASOPHILS NFR BLD AUTO: 0.6 %
BILIRUB SERPL-MCNC: 0.4 MG/DL
BILIRUBIN URINE: NEGATIVE
BLOOD URINE: NEGATIVE
BUN SERPL-MCNC: 25 MG/DL
CALCIUM SERPL-MCNC: 10.1 MG/DL
CHLORIDE SERPL-SCNC: 103 MMOL/L
CHOLEST SERPL-MCNC: 103 MG/DL
CHOLEST/HDLC SERPL: 3.3 RATIO
CO2 SERPL-SCNC: 24 MMOL/L
COLOR: YELLOW
CREAT SERPL-MCNC: 0.96 MG/DL
CREAT SPEC-SCNC: 99 MG/DL
EOSINOPHIL # BLD AUTO: 0.44 K/UL
EOSINOPHIL NFR BLD AUTO: 4.3 %
ESTIMATED AVERAGE GLUCOSE: 148 MG/DL
GLUCOSE QUALITATIVE U: ABNORMAL
GLUCOSE SERPL-MCNC: 135 MG/DL
HBA1C MFR BLD HPLC: 6.8 %
HCT VFR BLD CALC: 49.6 %
HDLC SERPL-MCNC: 32 MG/DL
HGB BLD-MCNC: 15.8 G/DL
HYALINE CASTS: 0 /LPF
IMM GRANULOCYTES NFR BLD AUTO: 0.5 %
KETONES URINE: ABNORMAL
LDLC SERPL CALC-MCNC: 50 MG/DL
LEUKOCYTE ESTERASE URINE: NEGATIVE
LYMPHOCYTES # BLD AUTO: 1.8 K/UL
LYMPHOCYTES NFR BLD AUTO: 17.7 %
MAGNESIUM SERPL-MCNC: 1.7 MG/DL
MAN DIFF?: NORMAL
MCHC RBC-ENTMCNC: 28.2 PG
MCHC RBC-ENTMCNC: 31.9 GM/DL
MCV RBC AUTO: 88.4 FL
MICROALBUMIN 24H UR DL<=1MG/L-MCNC: 3.4 MG/DL
MICROALBUMIN/CREAT 24H UR-RTO: 34 MG/G
MICROSCOPIC-UA: NORMAL
MONOCYTES # BLD AUTO: 0.73 K/UL
MONOCYTES NFR BLD AUTO: 7.2 %
NEUTROPHILS # BLD AUTO: 7.07 K/UL
NEUTROPHILS NFR BLD AUTO: 69.7 %
NITRITE URINE: NEGATIVE
PH URINE: 5.5
PLATELET # BLD AUTO: 195 K/UL
POTASSIUM SERPL-SCNC: 4.4 MMOL/L
PROT SERPL-MCNC: 7.3 G/DL
PROTEIN URINE: NEGATIVE
RBC # BLD: 5.61 M/UL
RBC # FLD: 14.4 %
RED BLOOD CELLS URINE: 0 /HPF
SODIUM SERPL-SCNC: 141 MMOL/L
SPECIFIC GRAVITY URINE: 1.03
SQUAMOUS EPITHELIAL CELLS: 0 /HPF
TRIGL SERPL-MCNC: 110 MG/DL
URATE SERPL-MCNC: 4.7 MG/DL
UROBILINOGEN URINE: NORMAL
VIT B12 SERPL-MCNC: 548 PG/ML
WBC # FLD AUTO: 10.15 K/UL
WHITE BLOOD CELLS URINE: 0 /HPF

## 2020-03-20 RX ORDER — BLOOD SUGAR DIAGNOSTIC
STRIP MISCELLANEOUS
Qty: 300 | Refills: 1 | Status: ACTIVE | COMMUNITY
Start: 2018-06-22

## 2020-05-19 LAB
CREAT 24H UR-MCNC: 1.5 G/24 H
CREAT ?TM UR-MCNC: 60 MG/DL
PROT 24H UR-MRATE: 10 MG/DL
PROT ?TM UR-MCNC: 24 HR
PROT UR-MCNC: 255 MG/24 H
SPECIMEN VOL 24H UR: 2550 ML

## 2020-06-18 ENCOUNTER — RX RENEWAL (OUTPATIENT)
Age: 80
End: 2020-06-18

## 2020-08-25 ENCOUNTER — APPOINTMENT (OUTPATIENT)
Dept: INTERNAL MEDICINE | Facility: CLINIC | Age: 80
End: 2020-08-25
Payer: MEDICARE

## 2020-08-25 VITALS
WEIGHT: 176 LBS | DIASTOLIC BLOOD PRESSURE: 80 MMHG | TEMPERATURE: 97.6 F | BODY MASS INDEX: 34.55 KG/M2 | OXYGEN SATURATION: 98 % | SYSTOLIC BLOOD PRESSURE: 125 MMHG | HEART RATE: 75 BPM | RESPIRATION RATE: 13 BRPM | HEIGHT: 60 IN

## 2020-08-25 PROCEDURE — 99213 OFFICE O/P EST LOW 20 MIN: CPT | Mod: 25

## 2020-08-25 PROCEDURE — 36415 COLL VENOUS BLD VENIPUNCTURE: CPT

## 2020-08-25 PROCEDURE — 90471 IMMUNIZATION ADMIN: CPT

## 2020-08-25 PROCEDURE — 90715 TDAP VACCINE 7 YRS/> IM: CPT

## 2020-08-25 NOTE — HISTORY OF PRESENT ILLNESS
[FreeTextEntry1] : Patient presents for followup on hypertension/hyperlipidemia/type 2 diabetes. Patient is fasting for today's labs. Patient is currently on atenolol/Diovan/nifedipine for his hypertension, on lipitor for his hyperlipidemia and is on Tresiba/invokana/metformin/repaglinide for his type 2 diabetes.\par -feels well without  complaints\par

## 2020-08-25 NOTE — ASSESSMENT
[FreeTextEntry1] : Venipuncture done in our office, Labs sent out/further recommendations will be made based on lab results. Patient advised to continue present medications with diet/exercise and specialist followup.TDAP given without reaction. Patient will return to the office in feb for CP (sees endo Q 3months)\par \par Shingrix d/ wpt\par last colonoscopy was July of 2012 with rec follow up in 3 years, overdue "to do"-FIT test "may do"\par 24-hour urine 5/2020\par specialists include\par 1. Cardiology-Dr. Orosco \par 2. Ophthalmology-Dr. Santana-1/2020\par 3. Rheumatology -Dr. Kleiner-declines followup\par 4.Endocrine-Dr. Veliz\par 5.Ortho/Hand-Dr. Zamora/Dr. Bruce\par declines podiatry\par HIV screen=declines\par Hepatitis C Screening 11/17 was negative\par echo 12/2019\par carotid sono 12/2019=no stenosis\par MA 2/2020\par

## 2020-08-26 LAB
ALBUMIN SERPL ELPH-MCNC: 4.5 G/DL
ALP BLD-CCNC: 53 U/L
ALT SERPL-CCNC: 19 U/L
ANION GAP SERPL CALC-SCNC: 14 MMOL/L
AST SERPL-CCNC: 21 U/L
BASOPHILS # BLD AUTO: 0.08 K/UL
BASOPHILS NFR BLD AUTO: 1 %
BILIRUB SERPL-MCNC: 0.4 MG/DL
BUN SERPL-MCNC: 28 MG/DL
CALCIUM SERPL-MCNC: 9.6 MG/DL
CHLORIDE SERPL-SCNC: 106 MMOL/L
CHOLEST SERPL-MCNC: 97 MG/DL
CHOLEST/HDLC SERPL: 3.2 RATIO
CO2 SERPL-SCNC: 22 MMOL/L
CREAT SERPL-MCNC: 1 MG/DL
EOSINOPHIL # BLD AUTO: 0.51 K/UL
EOSINOPHIL NFR BLD AUTO: 6.6 %
ESTIMATED AVERAGE GLUCOSE: 151 MG/DL
GLUCOSE SERPL-MCNC: 100 MG/DL
HBA1C MFR BLD HPLC: 6.9 %
HCT VFR BLD CALC: 46 %
HDLC SERPL-MCNC: 30 MG/DL
HGB BLD-MCNC: 15 G/DL
IMM GRANULOCYTES NFR BLD AUTO: 0.4 %
LDLC SERPL CALC-MCNC: 45 MG/DL
LYMPHOCYTES # BLD AUTO: 1.86 K/UL
LYMPHOCYTES NFR BLD AUTO: 24 %
MAGNESIUM SERPL-MCNC: 1.8 MG/DL
MAN DIFF?: NORMAL
MCHC RBC-ENTMCNC: 28.7 PG
MCHC RBC-ENTMCNC: 32.6 GM/DL
MCV RBC AUTO: 88.1 FL
MONOCYTES # BLD AUTO: 0.64 K/UL
MONOCYTES NFR BLD AUTO: 8.3 %
NEUTROPHILS # BLD AUTO: 4.62 K/UL
NEUTROPHILS NFR BLD AUTO: 59.7 %
PLATELET # BLD AUTO: 159 K/UL
POTASSIUM SERPL-SCNC: 4 MMOL/L
PROT SERPL-MCNC: 6.7 G/DL
RBC # BLD: 5.22 M/UL
RBC # FLD: 14.6 %
SODIUM SERPL-SCNC: 142 MMOL/L
TRIGL SERPL-MCNC: 107 MG/DL
URATE SERPL-MCNC: 4.8 MG/DL
WBC # FLD AUTO: 7.74 K/UL

## 2020-09-21 ENCOUNTER — RX RENEWAL (OUTPATIENT)
Age: 80
End: 2020-09-21

## 2020-12-21 ENCOUNTER — RX RENEWAL (OUTPATIENT)
Age: 80
End: 2020-12-21

## 2021-01-11 ENCOUNTER — NON-APPOINTMENT (OUTPATIENT)
Age: 81
End: 2021-01-11

## 2021-02-17 ENCOUNTER — RX RENEWAL (OUTPATIENT)
Age: 81
End: 2021-02-17

## 2021-03-10 ENCOUNTER — RX RENEWAL (OUTPATIENT)
Age: 81
End: 2021-03-10

## 2021-04-01 NOTE — COUNSELING
[Potential consequences of obesity discussed] : Potential consequences of obesity discussed [Benefits of weight loss discussed] : Benefits of weight loss discussed [Structured Weight Management Program suggested:] : Structured weight management program suggested [Encouraged to maintain food diary] : Encouraged to maintain food diary [Encouraged to increase physical activity] : Encouraged to increase physical activity [Healthy eating counseling provided] : healthy eating [Low Fat Diet] : Low fat diet [Decrease Portions] : Decrease food portions [Walking] : Walking [None] : None [Needs reinforcement, provided] : Patient needs reinforcement on understanding lifestyle changes and  the steps needed to achieve self management goals and reinforcement was provided

## 2021-04-02 ENCOUNTER — APPOINTMENT (OUTPATIENT)
Dept: INTERNAL MEDICINE | Facility: CLINIC | Age: 81
End: 2021-04-02
Payer: MEDICARE

## 2021-04-02 VITALS
SYSTOLIC BLOOD PRESSURE: 120 MMHG | WEIGHT: 179 LBS | RESPIRATION RATE: 16 BRPM | DIASTOLIC BLOOD PRESSURE: 70 MMHG | HEIGHT: 77 IN | BODY MASS INDEX: 21.13 KG/M2 | HEART RATE: 84 BPM | TEMPERATURE: 97.1 F

## 2021-04-02 DIAGNOSIS — K63.5 POLYP OF COLON: ICD-10-CM

## 2021-04-02 DIAGNOSIS — Z23 ENCOUNTER FOR IMMUNIZATION: ICD-10-CM

## 2021-04-02 DIAGNOSIS — Z86.2 PERSONAL HISTORY OF DISEASES OF THE BLOOD AND BLOOD-FORMING ORGANS AND CERTAIN DISORDERS INVOLVING THE IMMUNE MECHANISM: ICD-10-CM

## 2021-04-02 DIAGNOSIS — R21 RASH AND OTHER NONSPECIFIC SKIN ERUPTION: ICD-10-CM

## 2021-04-02 DIAGNOSIS — M25.512 PAIN IN LEFT SHOULDER: ICD-10-CM

## 2021-04-02 DIAGNOSIS — R76.8 OTHER SPECIFIED ABNORMAL IMMUNOLOGICAL FINDINGS IN SERUM: ICD-10-CM

## 2021-04-02 DIAGNOSIS — K21.9 GASTRO-ESOPHAGEAL REFLUX DISEASE W/OUT ESOPHAGITIS: ICD-10-CM

## 2021-04-02 DIAGNOSIS — Z12.11 ENCOUNTER FOR SCREENING FOR MALIGNANT NEOPLASM OF COLON: ICD-10-CM

## 2021-04-02 DIAGNOSIS — M75.82 OTHER SHOULDER LESIONS, LEFT SHOULDER: ICD-10-CM

## 2021-04-02 DIAGNOSIS — M72.0 PALMAR FASCIAL FIBROMATOSIS [DUPUYTREN]: ICD-10-CM

## 2021-04-02 DIAGNOSIS — Z92.89 PERSONAL HISTORY OF OTHER MEDICAL TREATMENT: ICD-10-CM

## 2021-04-02 PROCEDURE — G0439: CPT

## 2021-04-02 PROCEDURE — 36415 COLL VENOUS BLD VENIPUNCTURE: CPT

## 2021-04-02 PROCEDURE — 99072 ADDL SUPL MATRL&STAF TM PHE: CPT

## 2021-04-02 PROCEDURE — G0444 DEPRESSION SCREEN ANNUAL: CPT

## 2021-04-02 NOTE — HEALTH RISK ASSESSMENT
[Excellent] : ~his/her~  mood as  excellent [No] : In the past 12 months have you used drugs other than those required for medical reasons? No [No falls in past year] : Patient reported no falls in the past year [0] : 2) Feeling down, depressed, or hopeless: Not at all (0) [None] : None [With Significant Other] : lives with significant other [Retired] : retired [High School] : high school [] :  [# Of Children ___] : has [unfilled] children [Sexually Active] : sexually active [Feels Safe at Home] : Feels safe at home [Fully functional (bathing, dressing, toileting, transferring, walking, feeding)] : Fully functional (bathing, dressing, toileting, transferring, walking, feeding) [Fully functional (using the telephone, shopping, preparing meals, housekeeping, doing laundry, using] : Fully functional and needs no help or supervision to perform IADLs (using the telephone, shopping, preparing meals, housekeeping, doing laundry, using transportation, managing medications and managing finances) [Smoke Detector] : smoke detector [Carbon Monoxide Detector] : carbon monoxide detector [Seat Belt] :  uses seat belt [Sunscreen] : uses sunscreen [Reviewed no changes] : Reviewed no changes [Discussed at today's visit] : Advance Directives Discussed at today's visit [Designated Healthcare Proxy] : Designated healthcare proxy [Name: ___] : Health Care Proxy's Name: [unfilled]  [Very Good] : ~his/her~ current health as very good [] : No [Audit-CScore] : 0 [de-identified] : regular walking [de-identified] : fairly good [IBS1Gdolq] : 0 [Change in mental status noted] : No change in mental status noted [Reports changes in hearing] : Reports no changes in hearing [Reports changes in vision] : Reports no changes in vision [Reports changes in dental health] : Reports no changes in dental health [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

## 2021-04-02 NOTE — ASSESSMENT
[FreeTextEntry1] : 80-year-old male with long-standing hypertension, hyperlipidemia and type 2 diabetes and is status post CABG May 2018.\par \par Patient currently cardiologically stable\par \par Patient follows with endocrinology with fair control of diabetes with last hemoglobin A1c October 2019 =7.1\par Hopeful improved control.\par \par Patient currently clinically stable but continues to need lifestyle changes therefore encouraged diet, exercise and weight loss.\par \par Continue present medications.\par \par Cardiology and endocrinology followup as scheduled\par \par Colonoscopy July 2012.. Followup in 3 years and again told patient he is overdue for colonoscopy but continues to decline. Stool FIT tests have been given to him in the past but he has not done it and he requests another one which was given to him\par Endoscopy July 2012\par Ophthalmology Up to date\par Again recommend podiatry evaluation\par \par Vaccines up-to-date including influenza and COVID vaccies\par Discussed Shingrix\par \par Venipuncture done today in the office\par \par Followup in 6 months with patient seeing endocrinology about every 3 months

## 2021-04-03 LAB
ALBUMIN SERPL ELPH-MCNC: 4.5 G/DL
ALP BLD-CCNC: 66 U/L
ALT SERPL-CCNC: 20 U/L
ANION GAP SERPL CALC-SCNC: 12 MMOL/L
APPEARANCE: CLEAR
AST SERPL-CCNC: 17 U/L
BACTERIA: NEGATIVE
BASOPHILS # BLD AUTO: 0.06 K/UL
BASOPHILS NFR BLD AUTO: 0.7 %
BILIRUB SERPL-MCNC: 0.3 MG/DL
BILIRUBIN URINE: NEGATIVE
BLOOD URINE: NEGATIVE
BUN SERPL-MCNC: 30 MG/DL
CALCIUM SERPL-MCNC: 10.1 MG/DL
CHLORIDE SERPL-SCNC: 103 MMOL/L
CHOLEST SERPL-MCNC: 120 MG/DL
CO2 SERPL-SCNC: 24 MMOL/L
COLOR: YELLOW
CREAT SERPL-MCNC: 1.14 MG/DL
CREAT SPEC-SCNC: 85 MG/DL
EOSINOPHIL # BLD AUTO: 0.55 K/UL
EOSINOPHIL NFR BLD AUTO: 6.1 %
ESTIMATED AVERAGE GLUCOSE: 160 MG/DL
GLUCOSE QUALITATIVE U: ABNORMAL
GLUCOSE SERPL-MCNC: 154 MG/DL
HBA1C MFR BLD HPLC: 7.2 %
HCT VFR BLD CALC: 49.4 %
HDLC SERPL-MCNC: 33 MG/DL
HGB BLD-MCNC: 16.1 G/DL
HYALINE CASTS: 0 /LPF
IMM GRANULOCYTES NFR BLD AUTO: 0.7 %
KETONES URINE: NEGATIVE
LDLC SERPL CALC-MCNC: 66 MG/DL
LEUKOCYTE ESTERASE URINE: NEGATIVE
LYMPHOCYTES # BLD AUTO: 1.89 K/UL
LYMPHOCYTES NFR BLD AUTO: 21 %
MAGNESIUM SERPL-MCNC: 1.8 MG/DL
MAN DIFF?: NORMAL
MCHC RBC-ENTMCNC: 28.6 PG
MCHC RBC-ENTMCNC: 32.6 GM/DL
MCV RBC AUTO: 87.7 FL
MICROALBUMIN 24H UR DL<=1MG/L-MCNC: 4.4 MG/DL
MICROALBUMIN/CREAT 24H UR-RTO: 52 MG/G
MICROSCOPIC-UA: NORMAL
MONOCYTES # BLD AUTO: 0.7 K/UL
MONOCYTES NFR BLD AUTO: 7.8 %
NEUTROPHILS # BLD AUTO: 5.74 K/UL
NEUTROPHILS NFR BLD AUTO: 63.7 %
NITRITE URINE: NEGATIVE
NONHDLC SERPL-MCNC: 88 MG/DL
PH URINE: 5.5
PLATELET # BLD AUTO: 178 K/UL
POTASSIUM SERPL-SCNC: 5.1 MMOL/L
PROT SERPL-MCNC: 7.1 G/DL
PROTEIN URINE: NEGATIVE
PSA SERPL-MCNC: 3.31 NG/ML
RBC # BLD: 5.63 M/UL
RBC # FLD: 14.5 %
RED BLOOD CELLS URINE: 0 /HPF
SODIUM SERPL-SCNC: 139 MMOL/L
SPECIFIC GRAVITY URINE: 1.03
SQUAMOUS EPITHELIAL CELLS: 0 /HPF
TRIGL SERPL-MCNC: 110 MG/DL
URATE SERPL-MCNC: 4.9 MG/DL
UROBILINOGEN URINE: NORMAL
VIT B12 SERPL-MCNC: 438 PG/ML
WBC # FLD AUTO: 9 K/UL
WHITE BLOOD CELLS URINE: 0 /HPF

## 2021-04-06 ENCOUNTER — NON-APPOINTMENT (OUTPATIENT)
Age: 81
End: 2021-04-06

## 2021-04-25 ENCOUNTER — NON-APPOINTMENT (OUTPATIENT)
Age: 81
End: 2021-04-25

## 2021-05-24 ENCOUNTER — NON-APPOINTMENT (OUTPATIENT)
Age: 81
End: 2021-05-24

## 2021-06-05 LAB
CREAT 24H UR-MCNC: 1.3 G/24 H
CREAT ?TM UR-MCNC: 44 MG/DL
PROT 24H UR-MRATE: 8 MG/DL
PROT ?TM UR-MCNC: 24 HR
PROT UR-MCNC: 230 MG/24 H
SPECIMEN VOL 24H UR: 2875 ML

## 2021-06-07 ENCOUNTER — NON-APPOINTMENT (OUTPATIENT)
Age: 81
End: 2021-06-07

## 2021-06-22 ENCOUNTER — RX RENEWAL (OUTPATIENT)
Age: 81
End: 2021-06-22

## 2021-06-30 ENCOUNTER — RX RENEWAL (OUTPATIENT)
Age: 81
End: 2021-06-30

## 2021-08-04 NOTE — ED ADULT TRIAGE NOTE - DIRECT TO ROOM CARE INITIATED:
Cardiology Progress Note    Chief Complaint: weakness, altered mental status    Subjective: lethargic, denies pain. Still on levophed at 5 mcg/min and vasopressin     Current Medications:  • Magnesium Standard Replacement Protocol   Does not apply See Admin Instructions   • Potassium Standard Replacement Protocol   Does not apply See Admin Instructions   • midodrine  10 mg Oral TID AC   • melatonin  3 mg Oral Nightly   • furosemide  20 mg Intravenous BID   • polyethylene glycol  17 g Oral Daily   • ferrous sulfate  325 mg Oral Daily with breakfast   • meclizine  25 mg Oral TID   • timolol  1 drop Right Eye Nightly   • nystatin   Topical 2 times per day   • sodium chloride (PF)  2 mL Intracatheter 2 times per day   • pantoprazole  40 mg Intravenous 2 times per day   • levothyroxine  88 mcg Oral QAM AC      • NORepinephrine (LEVOPHED) 8 mg/250 mL in sodium chloride 0.9 % infusion 5 mcg/min (08/04/21 1529)   • vasopressin (PITRESSIN) 20 unit/100 mL in sodium chloride 0.9 % infusion 0.04 Units/min (08/04/21 1600)   • DOPamine (INTROPIN) 400 mg/250 mL in dextrose 5 % infusion Stopped (08/04/21 0115)   • sodium chloride 0.9% infusion     • sodium chloride 0.9% infusion Stopped (08/04/21 1230)       Objective:  Vital signs in last 24 hours:  Temp:  [96.3 °F (35.7 °C)-98.1 °F (36.7 °C)] 97.7 °F (36.5 °C)  Heart Rate:  [] 74  Resp:  [13-28] 17  BP: ()/(46-78) 97/68  Arterial Line BP: ()/(35-79) 114/79    Weight:  Weight    07/31/21 1400   Weight: 110.2 kg       Vital Most Recent Value First Value   Weight 110.2 kg Weight: 110.2 kg   Height 5' 5\" (165.1 cm) Height: 5' 5\" (165.1 cm)       Intake/output:    Intake/Output Summary (Last 24 hours) at 8/4/2021 1652  Last data filed at 8/4/2021 1427  Gross per 24 hour   Intake 853.69 ml   Output 890 ml   Net -36.31 ml                Physical Exam:     Vital Signs:    Vitals:    08/04/21 1000 08/04/21 1100 08/04/21 1200 08/04/21 1300   BP: 115/78 94/46 99/68 97/68    Pulse: 79 72 87 74   Resp: (!) 22 17 (!) 28 17   Temp: 98.1 °F (36.7 °C) 97.2 °F (36.2 °C) 97 °F (36.1 °C) 97.7 °F (36.5 °C)   TempSrc:       SpO2: 100% 92% (!) 78% 97%   Weight:       Height:         General:  Lethargic, Not in apparent distress or pain.   Skin:  Warm and dry without rash.  Normal temperature to touch.   Head:  Normocephalic-atraumatic.  No ear discharge.  Neck:  Trachea is midline.  No bruit is heard.  Eyes:  Normal conjunctivae and sclerae.  No discharge.  Cardiovascular:  irreg S1 and S2, 2/6 sm.    Respiratory:  Clear to auscultation.  No local tenderness.   Extremities:  No clubbing or cyanosis. 1+ edema    Recent Labs   Lab 08/04/21  1232 08/04/21  0432 08/03/21  1326 08/03/21  0615 08/02/21  0801 08/01/21  0517 07/31/21  2109 07/31/21  1703 07/31/21  1338   WBC  --  8.9  --   --   --  7.8 7.0 7.1 6.5   RBC  --  3.07*  --   --   --  2.63* 2.57* 2.72* 2.84*   HGB 9.2* 9.0* 8.6* 8.8* 9.5* 7.8* 7.5* 8.0* 8.5*   HCT 28.3* 28.1* 26.5* 26.8* 29.0* 24.3* 24.0* 25.4* 25.5*   MCV  --  91.5  --   --   --  92.4 93.4 93.4 89.8   PLT  --  250  --   --   --  254 252 244 212     No results for input(s): INR in the last 72 hours.  Recent Labs   Lab 04/19/21  0750 08/19/20  1046   Cholesterol 154 156   HDL 55 57   Triglycerides 97 73   CALCLDL 80 84   Non-HDL Cholesterol 99 99     Recent Labs   Lab 08/04/21  0432 08/03/21  0615 08/02/21  1312 08/01/21  2320 08/01/21  0517 07/31/21  0858 07/31/21  0851   SODIUM 145  --  141 140  --   --  142   POTASSIUM 3.2*  --  3.9 4.1  --   --  4.0   CHLORIDE 110*  --  108* 105  --   --  104   CO2 23 -- 21 19*  --   --  32   CREATININE 1.44* 1.70* 1.71* 1.68* 1.44*  --  1.68*   BUN 45*  --  44* 39*  --   --  39*   GLUCOSE 124*  --  157* 161*  --   --  98   ANIONGAP 15  --  16 20  --   --  10   MG 2.1  --   --  2.1 1.7  --   --    TSH  --   --   --   --   --  9.216*  --      Recent Labs   Lab 05/23/21  0023 05/22/21  1152   Troponin I, Ultra Sensitive <0.02  --     NT-proBNP  --  967*       Telemetry: controlled atrial fibrillation    Echocardiogram 7/31/2021  Normal left ventricular size, wall thickness and mild moderately decreased left ventricular systolic function. LVEF, 40-45%.  Global left ventricular hypokinesis.  Moderately increased right ventricular size, moderately decreased systolic function.  Severely increased left atrial volume 67.9 ml/m².  Moderate mitral valve regurgitation.  Moderate tricuspid valve regurgitation.  Mildly dilated ascending aorta, 4 cm.  Mild pulmonary hypertension, RVSP 38.6 mmHg.  Compared to prior study: LVEF is lower.     Chest x-ray 7/31/2021  Chronic cardiomegaly, similar to previous. No definitive pulmonary vascular congestion.  Chronic mild left basilar probable atelectasis and small left pleural effusion, similar to previous. New mild patchy right basilar atelectasis or possibly early airspace disease.   No right pleural effusion. No pneumothorax.     CT chest, abdomen, pelvis 7/31/2021  1.  Mild consolidation in the left lower lung, which may be related to atelectasis or pneumonia.  2.  Mildly enlarged mediastinal lymph nodes, nonspecific, possibly reactive.   3.  Cholelithiasis.   4.  Small calcification within the region of the pancreatic head, which may reflect a pancreatic parenchymal calcification versus stone within the common bile duct.  5.  Colonic diverticulosis.  6.  Partially visualized nodule within the left breast measuring up to 1.5 cm. Correlation with mammogram is recommended.  7.  Nodular opacity within the right lower lung measuring 5 mm. Follow-up per Fleischner criteria, as clinically indicated.    Limited Echo 8/3/2021  There is a 6 cm left pleural effusion  Mild global left ventricular hypokinesis. LVEF 45%  Mildly increased left ventricular cavity size.  Mild to moderate right ventricular hypokinesis.  Mildlymoderately  increased right ventricular size.  Dilated IVC.  No significant change since the prior  study.    The above studies were reviewed      Vitals:    08/04/21 1300   BP: 97/68   Pulse: 74   Resp: 17   Temp: 97.7 °F (36.5 °C)        Chest:  Clear to auscultation bilaterally  Heart:  irregular S1-S2, no added sounds.   No clubbing or cyanosis.   She is lethargic.  Not in distress.    Assessment/Plan:    1. Hypotension. Unclear etiology. No clear signs of infection/sepsis. H/H stable after blood transfusion. Cortisol normal. She is still requiring levophed and vasopressin  Could consider right heart catheterization however, given her advanced age and altered mental status, prefer to avoid invasive procedures.  Agree with palliative care consultation to help clarify goals of care.     2. Biventricular heart failure. Hypervolemic. Diurese and initiate anti-failure medication as BP allows.     3. Atrial fibrillation. Rates acceptable, 40-70s. She has had asymptomatic AF pauses of up to 3.7 seconds. Eliquis held for anemia/GI bleed.      4. Moderate MR.      5. Anemia, hemoccult positive stool. s/p 1 unit blood transfusion for Hgb of 7.5. Hgb now stable at 9.2     6. Agree with palliative care consultation    I attest that I interviewed and examined the patient, reviewed the data and the care plan with Tram Mckee PA-C and agree with the documentation as outlined above.  I reviewed and edited the above note as needed.    Ileana Vazquez MD, FACC, [facility]   14-May-2018 19:39

## 2021-08-05 ENCOUNTER — RX RENEWAL (OUTPATIENT)
Age: 81
End: 2021-08-05

## 2021-10-04 ENCOUNTER — RX RENEWAL (OUTPATIENT)
Age: 81
End: 2021-10-04

## 2021-10-04 RX ORDER — BLOOD SUGAR DIAGNOSTIC
STRIP MISCELLANEOUS
Qty: 300 | Refills: 1 | Status: ACTIVE | COMMUNITY
Start: 2021-10-04

## 2021-10-08 ENCOUNTER — APPOINTMENT (OUTPATIENT)
Dept: INTERNAL MEDICINE | Facility: CLINIC | Age: 81
End: 2021-10-08
Payer: MEDICARE

## 2021-10-08 VITALS
DIASTOLIC BLOOD PRESSURE: 75 MMHG | WEIGHT: 179.4 LBS | SYSTOLIC BLOOD PRESSURE: 120 MMHG | BODY MASS INDEX: 21.18 KG/M2 | HEART RATE: 72 BPM | HEIGHT: 77 IN | OXYGEN SATURATION: 98 % | TEMPERATURE: 97.2 F | RESPIRATION RATE: 13 BRPM

## 2021-10-08 PROCEDURE — 99214 OFFICE O/P EST MOD 30 MIN: CPT | Mod: 25

## 2021-10-08 PROCEDURE — 90662 IIV NO PRSV INCREASED AG IM: CPT

## 2021-10-08 PROCEDURE — G0008: CPT

## 2021-10-08 PROCEDURE — 36415 COLL VENOUS BLD VENIPUNCTURE: CPT

## 2021-10-08 NOTE — ASSESSMENT
[FreeTextEntry1] : HD Flu vaccine given without side effects or reaction.Venipuncture done in our office, Labs sent out/further recommendations will be made based on lab results. Patient advised to continue present medications with diet/exercise and specialist followup.Patient will return to the office in april for CP (sees endo Q 3months)\par \par Shingrix d/ wpt, +got covid vaccine\par last colonoscopy was July of 2012 with rec follow up in 3 years, overdue-declines"-FIT test =declines\par 24-hour urine 6/2021\par specialists include\par 1. Cardiology-Dr. Orosco \par 2. Ophthalmology-Dr. Santana-5/2021\par 3. Rheumatology -Dr. Kleiner-declines followup\par 4.Endocrine-Dr. Veliz\par 5.Ortho/Hand-Dr. Zamora/Dr. Bruce\par declines podiatry\par HIV screen=declines\par Hepatitis C Screening 11/17 was negative\par echo 12/2019\par carotid sono 12/2019=no stenosis\par MA 4/2021\par

## 2021-10-09 LAB
ALBUMIN SERPL ELPH-MCNC: 4.7 G/DL
ALP BLD-CCNC: 67 U/L
ALT SERPL-CCNC: 17 U/L
ANION GAP SERPL CALC-SCNC: 14 MMOL/L
AST SERPL-CCNC: 15 U/L
BASOPHILS # BLD AUTO: 0.07 K/UL
BASOPHILS NFR BLD AUTO: 0.7 %
BILIRUB SERPL-MCNC: 0.6 MG/DL
BUN SERPL-MCNC: 29 MG/DL
CALCIUM SERPL-MCNC: 9.7 MG/DL
CHLORIDE SERPL-SCNC: 101 MMOL/L
CHOLEST SERPL-MCNC: 106 MG/DL
CO2 SERPL-SCNC: 25 MMOL/L
CREAT SERPL-MCNC: 0.96 MG/DL
EOSINOPHIL # BLD AUTO: 0.55 K/UL
EOSINOPHIL NFR BLD AUTO: 5.3 %
ESTIMATED AVERAGE GLUCOSE: 163 MG/DL
GLUCOSE SERPL-MCNC: 136 MG/DL
HBA1C MFR BLD HPLC: 7.3 %
HCT VFR BLD CALC: 47.1 %
HDLC SERPL-MCNC: 32 MG/DL
HGB BLD-MCNC: 15.7 G/DL
IMM GRANULOCYTES NFR BLD AUTO: 0.5 %
LDLC SERPL CALC-MCNC: 52 MG/DL
LYMPHOCYTES # BLD AUTO: 1.94 K/UL
LYMPHOCYTES NFR BLD AUTO: 18.7 %
MAGNESIUM SERPL-MCNC: 1.8 MG/DL
MAN DIFF?: NORMAL
MCHC RBC-ENTMCNC: 29.3 PG
MCHC RBC-ENTMCNC: 33.3 GM/DL
MCV RBC AUTO: 88 FL
MONOCYTES # BLD AUTO: 0.86 K/UL
MONOCYTES NFR BLD AUTO: 8.3 %
NEUTROPHILS # BLD AUTO: 6.9 K/UL
NEUTROPHILS NFR BLD AUTO: 66.5 %
NONHDLC SERPL-MCNC: 74 MG/DL
PLATELET # BLD AUTO: 177 K/UL
POTASSIUM SERPL-SCNC: 4.2 MMOL/L
PROT SERPL-MCNC: 7 G/DL
RBC # BLD: 5.35 M/UL
RBC # FLD: 14.9 %
SODIUM SERPL-SCNC: 140 MMOL/L
TRIGL SERPL-MCNC: 110 MG/DL
URATE SERPL-MCNC: 4.5 MG/DL
WBC # FLD AUTO: 10.37 K/UL

## 2021-10-12 ENCOUNTER — NON-APPOINTMENT (OUTPATIENT)
Age: 81
End: 2021-10-12

## 2021-10-25 ENCOUNTER — NON-APPOINTMENT (OUTPATIENT)
Age: 81
End: 2021-10-25

## 2021-12-30 ENCOUNTER — NON-APPOINTMENT (OUTPATIENT)
Age: 81
End: 2021-12-30

## 2022-01-01 NOTE — PHYSICAL EXAM
Infant remains on RA with no A/B episodes noted this shift. Temps and vitals remain stable in bassinet. Tolerating feeds with no spits. NG removed this shift. Infant passed car seat after 90 minutes- see flow sheets for vitals. Voiding and stooling this shift. Parents at bedside  participating in cares. Plan of care reviewed.   [No Acute Distress] : no acute distress [Well-Appearing] : well-appearing [No JVD] : no jugular venous distention [No Respiratory Distress] : no respiratory distress  [Clear to Auscultation] : lungs were clear to auscultation bilaterally [Normal Rate] : normal rate  [Regular Rhythm] : with a regular rhythm [No Edema] : there was no peripheral edema [Normal Gait] : normal gait [Coordination Grossly Intact] : coordination grossly intact [No Focal Deficits] : no focal deficits [de-identified] : Significant Dupuytren's contracture left palm with decreased range of motion of fingers [de-identified] : Tremor of both hands

## 2022-01-26 ENCOUNTER — RX RENEWAL (OUTPATIENT)
Age: 82
End: 2022-01-26

## 2022-02-28 ENCOUNTER — RX RENEWAL (OUTPATIENT)
Age: 82
End: 2022-02-28

## 2022-03-03 ENCOUNTER — NON-APPOINTMENT (OUTPATIENT)
Age: 82
End: 2022-03-03

## 2022-04-14 NOTE — COUNSELING
[Potential consequences of obesity discussed] : Potential consequences of obesity discussed [Benefits of weight loss discussed] : Benefits of weight loss discussed [Structured Weight Management Program suggested:] : Structured weight management program suggested [Encouraged to maintain food diary] : Encouraged to maintain food diary [Encouraged to increase physical activity] : Encouraged to increase physical activity [Low Fat Diet] : Low fat diet [Healthy eating counseling provided] : healthy eating [Decrease Portions] : Decrease food portions [Walking] : Walking [None] : None [Needs reinforcement, provided] : Patient needs reinforcement on understanding lifestyle changes and  the steps needed to achieve self management goals and reinforcement was provided

## 2022-04-15 ENCOUNTER — APPOINTMENT (OUTPATIENT)
Dept: INTERNAL MEDICINE | Facility: CLINIC | Age: 82
End: 2022-04-15
Payer: MEDICARE

## 2022-04-15 VITALS
HEART RATE: 63 BPM | DIASTOLIC BLOOD PRESSURE: 70 MMHG | OXYGEN SATURATION: 96 % | BODY MASS INDEX: 21.13 KG/M2 | HEIGHT: 77 IN | RESPIRATION RATE: 14 BRPM | SYSTOLIC BLOOD PRESSURE: 115 MMHG | WEIGHT: 179 LBS

## 2022-04-15 DIAGNOSIS — Z12.5 ENCOUNTER FOR SCREENING FOR MALIGNANT NEOPLASM OF PROSTATE: ICD-10-CM

## 2022-04-15 DIAGNOSIS — Z13.31 ENCOUNTER FOR SCREENING FOR DEPRESSION: ICD-10-CM

## 2022-04-15 PROCEDURE — G0444 DEPRESSION SCREEN ANNUAL: CPT

## 2022-04-15 PROCEDURE — 36415 COLL VENOUS BLD VENIPUNCTURE: CPT

## 2022-04-15 PROCEDURE — G0439: CPT

## 2022-04-15 NOTE — HISTORY OF PRESENT ILLNESS
[FreeTextEntry1] : 81-year-old male with history of hypertension, hyperlipidemia and type 2 diabetes presents for his yearly physical.\par \par He continues to follow with endocrinology  every 3 months. No change in treatment.He states his last hemoglobin A1c was 7.1\par \par Significant issue was in May 2018 the patient had an abnormal nuclear stress test with cardiac catheterization showing severe triple vessel disease and the patient had a CABG on May 17, 2018.\par Postoperatively he had atrial fibrillation but converted to sinus rhythm.\par He has been fine since.\par Echo December 2019 showed normal LVEF with no valvular issues.\par Carotid duplex December 2019 showing plaque without stenosis.\par He has followed up with cardiology December 2021 with all being stable\par \par medications as listed\par \par Generally feeling well without complaints of chest pain, palpitations, shortness of breath or edema.\par \par History of arthritic and musculoskeletal multiple joint pains for which he saw rheumatology 2017 and does not want a followup.\par Diagnosis was osteoarthritis.\par Main complaints are increased left shoulder pain and worsening Dupuytren's contractures left hand.\par he had a Dupuytren's contracture corrected with benefit ad received a shot into the left shoulder with benefit.\par \par He does have a mild hand tremor which he states has been stable with no worsening.\par \par The patient is retired with 6 grandchildren and is turning 82 next week

## 2022-04-15 NOTE — PHYSICAL EXAM
[General Appearance - Alert] : alert [General Appearance - In No Acute Distress] : in no acute distress [Sclera] : the sclera and conjunctiva were normal [PERRL With Normal Accommodation] : pupils were equal in size, round, and reactive to light [Extraocular Movements] : extraocular movements were intact [Outer Ear] : the ears and nose were normal in appearance [Oropharynx] : the oropharynx was normal [Neck Appearance] : the appearance of the neck was normal [Neck Cervical Mass (___cm)] : no neck mass was observed [Jugular Venous Distention Increased] : there was no jugular-venous distention [Thyroid Diffuse Enlargement] : the thyroid was not enlarged [Thyroid Nodule] : there were no palpable thyroid nodules [Auscultation Breath Sounds / Voice Sounds] : lungs were clear to auscultation bilaterally [Heart Rate And Rhythm] : heart rate was normal and rhythm regular [Heart Sounds] : normal S1 and S2 [Heart Sounds Gallop] : no gallops [Murmurs] : no murmurs [Heart Sounds Pericardial Friction Rub] : no pericardial rub [Arterial Pulses Carotid] : carotid pulses were normal with no bruits [Abdominal Aorta] : the abdominal aorta was normal [Full Pulse] : the pedal pulses are present [Edema] : there was no peripheral edema [Veins - Varicosity Changes] : there were no varicosital changes [Bowel Sounds] : normal bowel sounds [Abdomen Soft] : soft [Abdomen Tenderness] : non-tender [Abdomen Mass (___ Cm)] : no abdominal mass palpated [Cervical Lymph Nodes Enlarged Posterior Bilaterally] : posterior cervical [Cervical Lymph Nodes Enlarged Anterior Bilaterally] : anterior cervical [Supraclavicular Lymph Nodes Enlarged Bilaterally] : supraclavicular [Axillary Lymph Nodes Enlarged Bilaterally] : axillary [Femoral Lymph Nodes Enlarged Bilaterally] : femoral [Inguinal Lymph Nodes Enlarged Bilaterally] : inguinal [No Spinal Tenderness] : no spinal tenderness [Abnormal Walk] : normal gait [Nail Clubbing] : no clubbing  or cyanosis of the fingernails [Motor Tone] : muscle strength and tone were normal [Musculoskeletal - Swelling] : no joint swelling seen [Skin Color & Pigmentation] : normal skin color and pigmentation [Skin Turgor] : normal skin turgor [Right Foot Was Examined] : right foot was examined [] : no rash [Left Foot Was Examined] : left foot was examined [Normal Appearance] : normal in appearance [Normal in Appearance] : normal in appearance [Normal] : normal [2+] : 2+ in the dorsalis pedis [Deep Tendon Reflexes (DTR)] : deep tendon reflexes were 2+ and symmetric [Sensation] : the sensory exam was normal to light touch and pinprick [No Focal Deficits] : no focal deficits [Oriented To Time, Place, And Person] : oriented to person, place, and time [Impaired Insight] : insight and judgment were intact [Affect] : the affect was normal [FreeTextEntry1] : Significant contractures left hand and decreased range of motion left shoulder [Tenderness] : not tender [Erythema] : not erythematous [Swelling] : not swollen [#1 Diminished] : number 1 was normal [#2 Diminished] : number 2 was normal [#3 Diminished] : number 3 was normal [#4 Diminished] : number 4 was normal [#5 Diminished] : number 5 was normal [#6 Diminished] : number 6 was normal [#7 Diminished] : number 7 was normal [#8 Diminished] : number 8 was normal [#9 Diminished] : number 9 was normal [#10 Diminished] : number 10 was normal

## 2022-04-15 NOTE — ASSESSMENT
[FreeTextEntry1] : 81-year-old male with long-standing hypertension, hyperlipidemia and type 2 diabetes and is status post CABG May 2018.\par \par Patient currently cardiologically stable\par \par Patient follows with endocrinology with fair control of diabetes with last Hemoglobin A1c =7.1\par Hopeful improved control.\par \par Patient currently clinically stable but continues to need lifestyle changes therefore encouraged diet, exercise and weight loss.\par \par Continue present medications.\par \par Cardiology and endocrinology followup as scheduled\par \par Colonoscopy July 2012.. Followup in 3 years and again told patient he is overdue for colonoscopy but continues to decline. Stool FIT tests have been given to him in the past but he has not done it. He declines any further testing\par Endoscopy July 2012\par Ophthalmology Up to date\par Again recommend podiatry evaluation\par \par Vaccines up-to-date including influenza and COVID x 3 vaccines\par Received the Zostavax. Discussed Shingrix\par \par Venipuncture done today in the office\par \par Followup in 6 months with patient seeing endocrinology about every 3 months

## 2022-04-16 LAB
ALBUMIN SERPL ELPH-MCNC: 4.5 G/DL
ALP BLD-CCNC: 54 U/L
ALT SERPL-CCNC: 24 U/L
ANION GAP SERPL CALC-SCNC: 16 MMOL/L
APPEARANCE: CLEAR
AST SERPL-CCNC: 21 U/L
BACTERIA: NEGATIVE
BASOPHILS # BLD AUTO: 0.06 K/UL
BASOPHILS NFR BLD AUTO: 0.9 %
BILIRUB SERPL-MCNC: 0.5 MG/DL
BILIRUBIN URINE: NEGATIVE
BLOOD URINE: NEGATIVE
BUN SERPL-MCNC: 31 MG/DL
CALCIUM SERPL-MCNC: 9.7 MG/DL
CHLORIDE SERPL-SCNC: 104 MMOL/L
CHOLEST SERPL-MCNC: 112 MG/DL
CO2 SERPL-SCNC: 21 MMOL/L
COLOR: NORMAL
CREAT SERPL-MCNC: 1.1 MG/DL
CREAT SPEC-SCNC: 102 MG/DL
EGFR: 68 ML/MIN/1.73M2
EOSINOPHIL # BLD AUTO: 0.51 K/UL
EOSINOPHIL NFR BLD AUTO: 7.3 %
ESTIMATED AVERAGE GLUCOSE: 174 MG/DL
GLUCOSE QUALITATIVE U: ABNORMAL
GLUCOSE SERPL-MCNC: 122 MG/DL
HBA1C MFR BLD HPLC: 7.7 %
HCT VFR BLD CALC: 46.9 %
HDLC SERPL-MCNC: 33 MG/DL
HGB BLD-MCNC: 15.3 G/DL
HYALINE CASTS: 0 /LPF
IMM GRANULOCYTES NFR BLD AUTO: 0.4 %
KETONES URINE: NEGATIVE
LDLC SERPL CALC-MCNC: 58 MG/DL
LEUKOCYTE ESTERASE URINE: NEGATIVE
LYMPHOCYTES # BLD AUTO: 1.72 K/UL
LYMPHOCYTES NFR BLD AUTO: 24.5 %
MAGNESIUM SERPL-MCNC: 1.7 MG/DL
MAN DIFF?: NORMAL
MCHC RBC-ENTMCNC: 28.7 PG
MCHC RBC-ENTMCNC: 32.6 GM/DL
MCV RBC AUTO: 87.8 FL
MICROALBUMIN 24H UR DL<=1MG/L-MCNC: 9 MG/DL
MICROALBUMIN/CREAT 24H UR-RTO: 88 MG/G
MICROSCOPIC-UA: NORMAL
MONOCYTES # BLD AUTO: 0.61 K/UL
MONOCYTES NFR BLD AUTO: 8.7 %
NEUTROPHILS # BLD AUTO: 4.1 K/UL
NEUTROPHILS NFR BLD AUTO: 58.2 %
NITRITE URINE: NEGATIVE
NONHDLC SERPL-MCNC: 79 MG/DL
PH URINE: 5
PLATELET # BLD AUTO: 145 K/UL
POTASSIUM SERPL-SCNC: 4.9 MMOL/L
PROT SERPL-MCNC: 6.8 G/DL
PROTEIN URINE: NORMAL
PSA SERPL-MCNC: 2.22 NG/ML
RBC # BLD: 5.34 M/UL
RBC # FLD: 15.1 %
RED BLOOD CELLS URINE: 0 /HPF
SODIUM SERPL-SCNC: 141 MMOL/L
SPECIFIC GRAVITY URINE: 1.03
SQUAMOUS EPITHELIAL CELLS: 0 /HPF
TRIGL SERPL-MCNC: 104 MG/DL
URATE SERPL-MCNC: 5.3 MG/DL
UROBILINOGEN URINE: NORMAL
VIT B12 SERPL-MCNC: 406 PG/ML
WBC # FLD AUTO: 7.03 K/UL
WHITE BLOOD CELLS URINE: 0 /HPF

## 2022-04-19 ENCOUNTER — NON-APPOINTMENT (OUTPATIENT)
Age: 82
End: 2022-04-19

## 2022-05-19 ENCOUNTER — APPOINTMENT (OUTPATIENT)
Dept: INTERNAL MEDICINE | Facility: CLINIC | Age: 82
End: 2022-05-19
Payer: MEDICARE

## 2022-05-19 VITALS
HEART RATE: 85 BPM | SYSTOLIC BLOOD PRESSURE: 130 MMHG | WEIGHT: 180 LBS | OXYGEN SATURATION: 98 % | TEMPERATURE: 97 F | RESPIRATION RATE: 13 BRPM | HEIGHT: 66 IN | DIASTOLIC BLOOD PRESSURE: 70 MMHG | BODY MASS INDEX: 28.93 KG/M2

## 2022-05-19 LAB
BASOPHILS # BLD AUTO: 0.05 K/UL
BASOPHILS NFR BLD AUTO: 0.7 %
EOSINOPHIL # BLD AUTO: 0.49 K/UL
EOSINOPHIL NFR BLD AUTO: 6.6 %
HCT VFR BLD CALC: 44 %
HGB BLD-MCNC: 14.7 G/DL
IMM GRANULOCYTES NFR BLD AUTO: 0.4 %
LYMPHOCYTES # BLD AUTO: 1.57 K/UL
LYMPHOCYTES NFR BLD AUTO: 21.2 %
MAN DIFF?: NORMAL
MCHC RBC-ENTMCNC: 29.1 PG
MCHC RBC-ENTMCNC: 33.4 GM/DL
MCV RBC AUTO: 87.1 FL
MONOCYTES # BLD AUTO: 0.57 K/UL
MONOCYTES NFR BLD AUTO: 7.7 %
NEUTROPHILS # BLD AUTO: 4.7 K/UL
NEUTROPHILS NFR BLD AUTO: 63.4 %
PLATELET # BLD AUTO: 165 K/UL
RBC # BLD: 5.05 M/UL
RBC # FLD: 15 %
WBC # FLD AUTO: 7.41 K/UL

## 2022-05-19 PROCEDURE — 99213 OFFICE O/P EST LOW 20 MIN: CPT | Mod: 25

## 2022-05-19 PROCEDURE — 36415 COLL VENOUS BLD VENIPUNCTURE: CPT

## 2022-05-19 NOTE — ASSESSMENT
[FreeTextEntry1] : Venipuncture done in our office, Labs sent out/further recommendations will be made based on lab results. Patient advised to continue present medications with diet/exercise and specialist followup.Patient will return to the office as sched for reg check\par \par Shingrix d/ wpt, +got covid vaccines X3\par last colonoscopy was July of 2012 with rec follow up in 3 years, overdue-declines"-FIT test =declines\par 24-hour urine 6/2021 "to do"\par specialists include\par 1. Cardiology-Dr. Orosco \par 2. Ophthalmology-Dr. Santana-3/2022\par 3. Rheumatology -Dr. Kleiner-declines followup\par 4.Endocrine-Dr. Veliz\par 5.Ortho/Hand-Dr. Zamora/Dr. Bruce\par declines podiatry\par Hepatitis C Screening 11/17 was negative\par echo 12/2019\par carotid sono 12/2019=no stenosis\par MA 4/2022\par

## 2022-05-19 NOTE — HISTORY OF PRESENT ILLNESS
[FreeTextEntry1] : Patient presents for followup on hypertension/repeat labs. Patient is currently on atenolol/Diovan/nifedipine for his hypertension and was found to have platelets decreased at 145 w/last labs\par -abnormal results d/w pt and questions answered\par

## 2022-05-20 ENCOUNTER — NON-APPOINTMENT (OUTPATIENT)
Age: 82
End: 2022-05-20

## 2022-05-24 ENCOUNTER — NON-APPOINTMENT (OUTPATIENT)
Age: 82
End: 2022-05-24

## 2022-05-24 LAB
CREAT 24H UR-MCNC: 1.2 G/24 H
CREAT ?TM UR-MCNC: 41 MG/DL
PROT 24H UR-MRATE: 6 MG/DL
PROT ?TM UR-MCNC: 24 HR
PROT UR-MCNC: 177 MG/24 H
SPECIMEN VOL 24H UR: 2950 ML

## 2022-06-27 ENCOUNTER — NON-APPOINTMENT (OUTPATIENT)
Age: 82
End: 2022-06-27

## 2022-07-28 ENCOUNTER — RX RENEWAL (OUTPATIENT)
Age: 82
End: 2022-07-28

## 2022-08-14 ENCOUNTER — RX RENEWAL (OUTPATIENT)
Age: 82
End: 2022-08-14

## 2022-10-13 PROBLEM — Z13.31 SCREENING FOR DEPRESSION: Status: ACTIVE | Noted: 2022-04-15

## 2022-10-24 ENCOUNTER — NON-APPOINTMENT (OUTPATIENT)
Age: 82
End: 2022-10-24

## 2022-11-09 ENCOUNTER — APPOINTMENT (OUTPATIENT)
Dept: INTERNAL MEDICINE | Facility: CLINIC | Age: 82
End: 2022-11-09

## 2022-11-09 VITALS
DIASTOLIC BLOOD PRESSURE: 75 MMHG | BODY MASS INDEX: 28.77 KG/M2 | RESPIRATION RATE: 14 BRPM | WEIGHT: 179 LBS | TEMPERATURE: 97 F | OXYGEN SATURATION: 96 % | HEIGHT: 66 IN | HEART RATE: 92 BPM | SYSTOLIC BLOOD PRESSURE: 125 MMHG

## 2022-11-09 DIAGNOSIS — Z23 ENCOUNTER FOR IMMUNIZATION: ICD-10-CM

## 2022-11-09 PROCEDURE — 90662 IIV NO PRSV INCREASED AG IM: CPT

## 2022-11-09 PROCEDURE — 36415 COLL VENOUS BLD VENIPUNCTURE: CPT

## 2022-11-09 PROCEDURE — G0008: CPT

## 2022-11-09 PROCEDURE — 99214 OFFICE O/P EST MOD 30 MIN: CPT | Mod: 25

## 2022-11-09 NOTE — ASSESSMENT
[FreeTextEntry1] : HD Flu vaccine given without side effects or reaction.Venipuncture done in our office, Labs sent out/further recommendations will be made based on lab results. Patient advised to continue present medications with diet/exercise and specialist followup.Patient will return to the office for CP in april\par \par \par Shingrix d/ wpt, +got covid vaccines X2\par last colonoscopy was July of 2012 with rec follow up in 3 years, overdue-declines"-FIT test =declines\par 24-hour urine=5/2022\par specialists include\par 1. Cardiology-Dr. Orosco \par 2. Ophthalmology-Dr. Santana-6/2022\par 3. Rheumatology -Dr. Kleiner-declines followup\par 4.Endocrine-Dr. Veliz\par 5.Ortho/Hand-Dr. Zamora/Dr. Bruce\par declines podiatry\par Hepatitis C Screening 11/17 was negative\par echo 12/2019\par carotid sono 12/2019=no stenosis\par MA 4/2022\par

## 2022-11-10 ENCOUNTER — NON-APPOINTMENT (OUTPATIENT)
Age: 82
End: 2022-11-10

## 2022-11-10 LAB
ALBUMIN SERPL ELPH-MCNC: 4.5 G/DL
ALP BLD-CCNC: 66 U/L
ALT SERPL-CCNC: 15 U/L
ANION GAP SERPL CALC-SCNC: 15 MMOL/L
AST SERPL-CCNC: 17 U/L
BASOPHILS # BLD AUTO: 0.05 K/UL
BASOPHILS NFR BLD AUTO: 0.5 %
BILIRUB SERPL-MCNC: 0.4 MG/DL
BUN SERPL-MCNC: 26 MG/DL
CALCIUM SERPL-MCNC: 9.6 MG/DL
CHLORIDE SERPL-SCNC: 104 MMOL/L
CHOLEST SERPL-MCNC: 124 MG/DL
CO2 SERPL-SCNC: 21 MMOL/L
CREAT SERPL-MCNC: 0.85 MG/DL
EGFR: 88 ML/MIN/1.73M2
EOSINOPHIL # BLD AUTO: 0.46 K/UL
EOSINOPHIL NFR BLD AUTO: 4.9 %
ESTIMATED AVERAGE GLUCOSE: 157 MG/DL
GLUCOSE SERPL-MCNC: 128 MG/DL
HBA1C MFR BLD HPLC: 7.1 %
HCT VFR BLD CALC: 45.4 %
HDLC SERPL-MCNC: 36 MG/DL
HGB BLD-MCNC: 15.1 G/DL
IMM GRANULOCYTES NFR BLD AUTO: 0.5 %
LDLC SERPL CALC-MCNC: 68 MG/DL
LYMPHOCYTES # BLD AUTO: 1.4 K/UL
LYMPHOCYTES NFR BLD AUTO: 15.1 %
MAGNESIUM SERPL-MCNC: 1.7 MG/DL
MAN DIFF?: NORMAL
MCHC RBC-ENTMCNC: 29 PG
MCHC RBC-ENTMCNC: 33.3 GM/DL
MCV RBC AUTO: 87.1 FL
MONOCYTES # BLD AUTO: 0.64 K/UL
MONOCYTES NFR BLD AUTO: 6.9 %
NEUTROPHILS # BLD AUTO: 6.7 K/UL
NEUTROPHILS NFR BLD AUTO: 72.1 %
NONHDLC SERPL-MCNC: 89 MG/DL
PLATELET # BLD AUTO: 172 K/UL
POTASSIUM SERPL-SCNC: 4.1 MMOL/L
PROT SERPL-MCNC: 6.5 G/DL
RBC # BLD: 5.21 M/UL
RBC # FLD: 14.9 %
SODIUM SERPL-SCNC: 140 MMOL/L
TRIGL SERPL-MCNC: 105 MG/DL
URATE SERPL-MCNC: 4.5 MG/DL
WBC # FLD AUTO: 9.3 K/UL

## 2022-12-03 ENCOUNTER — OFFICE (OUTPATIENT)
Dept: URBAN - METROPOLITAN AREA CLINIC 104 | Facility: CLINIC | Age: 82
Setting detail: OPHTHALMOLOGY
End: 2022-12-03
Payer: MEDICARE

## 2022-12-03 DIAGNOSIS — H01.005: ICD-10-CM

## 2022-12-03 DIAGNOSIS — H01.004: ICD-10-CM

## 2022-12-03 DIAGNOSIS — H43.813: ICD-10-CM

## 2022-12-03 DIAGNOSIS — H01.002: ICD-10-CM

## 2022-12-03 DIAGNOSIS — H01.001: ICD-10-CM

## 2022-12-03 DIAGNOSIS — Z96.1: ICD-10-CM

## 2022-12-03 DIAGNOSIS — E11.9: ICD-10-CM

## 2022-12-03 DIAGNOSIS — H40.1132: ICD-10-CM

## 2022-12-03 PROCEDURE — 92014 COMPRE OPH EXAM EST PT 1/>: CPT | Performed by: OPHTHALMOLOGY

## 2022-12-03 PROCEDURE — 92250 FUNDUS PHOTOGRAPHY W/I&R: CPT | Performed by: OPHTHALMOLOGY

## 2022-12-03 ASSESSMENT — KERATOMETRY
OS_K2POWER_DIOPTERS: 46.68
OD_K1POWER_DIOPTERS: 44.42
OD_K2POWER_DIOPTERS: 45.73
OS_AXISANGLE_DEGREES: 155
OD_AXISANGLE_DEGREES: 023
OS_K1POWER_DIOPTERS: 44.48

## 2022-12-03 ASSESSMENT — LID EXAM ASSESSMENTS
OD_BLEPHARITIS: RLL RUL 2+
OS_BLEPHARITIS: LLL LUL 2+

## 2022-12-03 ASSESSMENT — PACHYMETRY
OD_CT_UM: 555
OS_CT_CORRECTION: -1
OD_CT_CORRECTION: -1
OS_CT_UM: 559

## 2022-12-03 ASSESSMENT — AXIALLENGTH_DERIVED: OD_AL: 23.0272

## 2022-12-03 ASSESSMENT — REFRACTION_AUTOREFRACTION
OS_SPHERE: PL
OD_AXIS: 100
OS_AXIS: 049
OD_CYLINDER: -1.00
OS_CYLINDER: -1.50
OD_SPHERE: +0.50

## 2022-12-03 ASSESSMENT — CONFRONTATIONAL VISUAL FIELD TEST (CVF)
OD_FINDINGS: FULL
OS_FINDINGS: FULL

## 2022-12-03 ASSESSMENT — SPHEQUIV_DERIVED: OD_SPHEQUIV: 0

## 2022-12-03 ASSESSMENT — VISUAL ACUITY
OD_BCVA: 20/40
OS_BCVA: 20/25

## 2022-12-03 ASSESSMENT — TONOMETRY: OD_IOP_MMHG: 10

## 2022-12-05 ENCOUNTER — NON-APPOINTMENT (OUTPATIENT)
Age: 82
End: 2022-12-05

## 2023-01-09 ENCOUNTER — RX RENEWAL (OUTPATIENT)
Age: 83
End: 2023-01-09

## 2023-02-06 ENCOUNTER — RX RENEWAL (OUTPATIENT)
Age: 83
End: 2023-02-06

## 2023-03-22 ENCOUNTER — NON-APPOINTMENT (OUTPATIENT)
Age: 83
End: 2023-03-22

## 2023-04-13 ENCOUNTER — OFFICE (OUTPATIENT)
Dept: URBAN - METROPOLITAN AREA CLINIC 104 | Facility: CLINIC | Age: 83
Setting detail: OPHTHALMOLOGY
End: 2023-04-13
Payer: MEDICARE

## 2023-04-13 ENCOUNTER — NON-APPOINTMENT (OUTPATIENT)
Age: 83
End: 2023-04-13

## 2023-04-13 DIAGNOSIS — E11.9: ICD-10-CM

## 2023-04-13 DIAGNOSIS — H01.001: ICD-10-CM

## 2023-04-13 DIAGNOSIS — H40.1132: ICD-10-CM

## 2023-04-13 DIAGNOSIS — Z96.1: ICD-10-CM

## 2023-04-13 DIAGNOSIS — H01.002: ICD-10-CM

## 2023-04-13 DIAGNOSIS — H43.813: ICD-10-CM

## 2023-04-13 DIAGNOSIS — H01.005: ICD-10-CM

## 2023-04-13 DIAGNOSIS — H01.004: ICD-10-CM

## 2023-04-13 PROCEDURE — 92083 EXTENDED VISUAL FIELD XM: CPT | Performed by: OPHTHALMOLOGY

## 2023-04-13 PROCEDURE — 99213 OFFICE O/P EST LOW 20 MIN: CPT | Performed by: OPHTHALMOLOGY

## 2023-04-13 ASSESSMENT — CONFRONTATIONAL VISUAL FIELD TEST (CVF)
OD_FINDINGS: FULL
OS_FINDINGS: FULL

## 2023-04-13 ASSESSMENT — SPHEQUIV_DERIVED
OD_SPHEQUIV: 0.25
OD_SPHEQUIV: 0.625
OS_SPHEQUIV: 0.75
OS_SPHEQUIV: 0.25

## 2023-04-13 ASSESSMENT — REFRACTION_MANIFEST
OS_AXIS: 070
OS_VA2: 20/20
OD_ADD: +2.50
OS_VA1: 20/25
OD_VA1: 20/20
OD_SPHERE: +1.00
OD_VA2: 20/20
OS_ADD: +2.50
OD_AXIS: 100
OS_SPHERE: +1.00
OS_CYLINDER: -1.50
OD_CYLINDER: -1.50

## 2023-04-13 ASSESSMENT — REFRACTION_AUTOREFRACTION
OS_AXIS: 069
OS_SPHERE: +1.75
OD_CYLINDER: -2.75
OS_CYLINDER: -2.00
OD_SPHERE: +2.00
OD_AXIS: 100

## 2023-04-13 ASSESSMENT — VISUAL ACUITY
OD_BCVA: 20/30
OS_BCVA: 20/30-

## 2023-04-13 ASSESSMENT — KERATOMETRY
OS_K1POWER_DIOPTERS: 44.67
OS_K2POWER_DIOPTERS: 46.62
OD_K2POWER_DIOPTERS: 45.49
OD_AXISANGLE_DEGREES: 016
OS_AXISANGLE_DEGREES: 156
OD_K1POWER_DIOPTERS: 44.53

## 2023-04-13 ASSESSMENT — PACHYMETRY
OD_CT_CORRECTION: -1
OS_CT_UM: 559
OD_CT_UM: 555
OS_CT_CORRECTION: -1

## 2023-04-13 ASSESSMENT — TONOMETRY
OD_IOP_MMHG: 15
OS_IOP_MMHG: 16

## 2023-04-13 ASSESSMENT — AXIALLENGTH_DERIVED
OD_AL: 22.8199
OD_AL: 22.96
OS_AL: 22.74
OS_AL: 22.56

## 2023-04-13 ASSESSMENT — LID EXAM ASSESSMENTS
OD_BLEPHARITIS: RLL RUL 2+
OS_BLEPHARITIS: LLL LUL 2+

## 2023-05-10 ENCOUNTER — NON-APPOINTMENT (OUTPATIENT)
Age: 83
End: 2023-05-10

## 2023-06-11 NOTE — COUNSELING
[Potential consequences of obesity discussed] : Potential consequences of obesity discussed [Benefits of weight loss discussed] : Benefits of weight loss discussed [Structured Weight Management Program suggested:] : Structured weight management program suggested [Encouraged to maintain food diary] : Encouraged to maintain food diary [Healthy eating counseling provided] : healthy eating [Encouraged to increase physical activity] : Encouraged to increase physical activity [Low Fat Diet] : Low fat diet [Decrease Portions] : Decrease food portions [Walking] : Walking [None] : None [Needs reinforcement, provided] : Patient needs reinforcement on understanding lifestyle changes and  the steps needed to achieve self management goals and reinforcement was provided

## 2023-06-12 ENCOUNTER — APPOINTMENT (OUTPATIENT)
Dept: INTERNAL MEDICINE | Facility: CLINIC | Age: 83
End: 2023-06-12
Payer: MEDICARE

## 2023-06-12 VITALS
SYSTOLIC BLOOD PRESSURE: 138 MMHG | OXYGEN SATURATION: 98 % | RESPIRATION RATE: 14 BRPM | HEIGHT: 66 IN | HEART RATE: 72 BPM | DIASTOLIC BLOOD PRESSURE: 75 MMHG | WEIGHT: 177 LBS | BODY MASS INDEX: 28.45 KG/M2

## 2023-06-12 DIAGNOSIS — D69.6 THROMBOCYTOPENIA, UNSPECIFIED: ICD-10-CM

## 2023-06-12 PROCEDURE — 36415 COLL VENOUS BLD VENIPUNCTURE: CPT

## 2023-06-12 PROCEDURE — G0439: CPT

## 2023-06-12 RX ORDER — INSULIN DEGLUDEC INJECTION 100 U/ML
100 INJECTION, SOLUTION SUBCUTANEOUS DAILY
Refills: 0 | Status: DISCONTINUED | COMMUNITY
Start: 2020-02-20 | End: 2023-06-12

## 2023-06-12 RX ORDER — EMPAGLIFLOZIN 25 MG/1
25 TABLET, FILM COATED ORAL
Qty: 90 | Refills: 1 | Status: ACTIVE | COMMUNITY
Start: 2023-06-12

## 2023-06-12 RX ORDER — CANAGLIFLOZIN 100 MG/1
100 TABLET, FILM COATED ORAL
Refills: 1 | Status: DISCONTINUED | COMMUNITY
Start: 2020-02-20 | End: 2023-06-12

## 2023-06-12 NOTE — HEALTH RISK ASSESSMENT
[Very Good] : ~his/her~ current health as very good [Excellent] : ~his/her~  mood as  excellent [No] : In the past 12 months have you used drugs other than those required for medical reasons? No [No falls in past year] : Patient reported no falls in the past year [0] : 2) Feeling down, depressed, or hopeless: Not at all (0) [PHQ-2 Negative - No further assessment needed] : PHQ-2 Negative - No further assessment needed [None] : None [With Significant Other] : lives with significant other [Retired] : retired [High School] : high school [] :  [# Of Children ___] : has [unfilled] children [Sexually Active] : sexually active [Feels Safe at Home] : Feels safe at home [Fully functional (bathing, dressing, toileting, transferring, walking, feeding)] : Fully functional (bathing, dressing, toileting, transferring, walking, feeding) [Fully functional (using the telephone, shopping, preparing meals, housekeeping, doing laundry, using] : Fully functional and needs no help or supervision to perform IADLs (using the telephone, shopping, preparing meals, housekeeping, doing laundry, using transportation, managing medications and managing finances) [Smoke Detector] : smoke detector [Carbon Monoxide Detector] : carbon monoxide detector [Seat Belt] :  uses seat belt [Sunscreen] : uses sunscreen [Reviewed no changes] : Reviewed, no changes [Former] : Former [Discussed at today's visit] : Advance Directives Discussed at today's visit [Designated Healthcare Proxy] : Designated healthcare proxy [Name: ___] : Health Care Proxy's Name: [unfilled]  [> 15 Years] : > 15 Years [Audit-CScore] : 0 [de-identified] : regular walking [de-identified] : fairly good [[id number]] : 0 [Change in mental status noted] : No change in mental status noted [Reports changes in hearing] : Reports no changes in hearing [Reports changes in vision] : Reports no changes in vision [Reports changes in dental health] : Reports no changes in dental health [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

## 2023-06-12 NOTE — ASSESSMENT
[FreeTextEntry1] : 83-year-old male with long-standing hypertension, hyperlipidemia and type 2 diabetes and is status post CABG May 2018.\par \par Patient currently cardiologically stable\par Hypertension and hyperlipidemia controlled on present medications\par Gout quiet\par \par Patient currently clinically stable but continues to need lifestyle changes therefore encouraged diet, exercise and weight loss.\par \par Continue present medications.\par \par Cardiology and endocrinology followup as scheduled\par \par Colonoscopy July 2012.. Followup in 3 years and again told patient he is overdue for colonoscopy but continues to decline. Stool FIT tests have been given to him in the past but he has not done it. He declines any further testing\par Endoscopy July 2012\par Ophthalmology Up to date\par Again recommend podiatry evaluation\par \par Vaccines up-to-date including influenza and COVID vaccines\par Received the Zostavax. Discussed Shingrix\par \par Venipuncture done today in the office\par \par Followup in 4 months with patient seeing endocrinology about every 3 months

## 2023-06-12 NOTE — PHYSICAL EXAM
[General Appearance - Alert] : alert [General Appearance - In No Acute Distress] : in no acute distress [Sclera] : the sclera and conjunctiva were normal [PERRL With Normal Accommodation] : pupils were equal in size, round, and reactive to light [Extraocular Movements] : extraocular movements were intact [Outer Ear] : the ears and nose were normal in appearance [Neck Appearance] : the appearance of the neck was normal [Oropharynx] : the oropharynx was normal [Neck Cervical Mass (___cm)] : no neck mass was observed [Jugular Venous Distention Increased] : there was no jugular-venous distention [Thyroid Diffuse Enlargement] : the thyroid was not enlarged [Thyroid Nodule] : there were no palpable thyroid nodules [Auscultation Breath Sounds / Voice Sounds] : lungs were clear to auscultation bilaterally [Heart Rate And Rhythm] : heart rate was normal and rhythm regular [Heart Sounds Gallop] : no gallops [Heart Sounds] : normal S1 and S2 [Murmurs] : no murmurs [Heart Sounds Pericardial Friction Rub] : no pericardial rub [Arterial Pulses Carotid] : carotid pulses were normal with no bruits [Abdominal Aorta] : the abdominal aorta was normal [Full Pulse] : the pedal pulses are present [Veins - Varicosity Changes] : there were no varicosital changes [Edema] : there was no peripheral edema [Bowel Sounds] : normal bowel sounds [Abdomen Tenderness] : non-tender [Abdomen Soft] : soft [Abdomen Mass (___ Cm)] : no abdominal mass palpated [Cervical Lymph Nodes Enlarged Posterior Bilaterally] : posterior cervical [Cervical Lymph Nodes Enlarged Anterior Bilaterally] : anterior cervical [Supraclavicular Lymph Nodes Enlarged Bilaterally] : supraclavicular [Axillary Lymph Nodes Enlarged Bilaterally] : axillary [Femoral Lymph Nodes Enlarged Bilaterally] : femoral [Inguinal Lymph Nodes Enlarged Bilaterally] : inguinal [No Spinal Tenderness] : no spinal tenderness [Abnormal Walk] : normal gait [Nail Clubbing] : no clubbing  or cyanosis of the fingernails [Musculoskeletal - Swelling] : no joint swelling seen [Motor Tone] : muscle strength and tone were normal [Skin Color & Pigmentation] : normal skin color and pigmentation [Skin Turgor] : normal skin turgor [Right Foot Was Examined] : right foot was examined [] : no rash [Left Foot Was Examined] : left foot was examined [Normal Appearance] : normal in appearance [Normal in Appearance] : normal in appearance [Normal] : normal [2+] : 2+ in the dorsalis pedis [Sensation] : the sensory exam was normal to light touch and pinprick [Deep Tendon Reflexes (DTR)] : deep tendon reflexes were 2+ and symmetric [No Focal Deficits] : no focal deficits [Oriented To Time, Place, And Person] : oriented to person, place, and time [Impaired Insight] : insight and judgment were intact [Affect] : the affect was normal [FreeTextEntry1] : Significant contractures left hand and decreased range of motion left shoulder [Tenderness] : not tender [Erythema] : not erythematous [Swelling] : not swollen [#1 Diminished] : number 1 was normal [#2 Diminished] : number 2 was normal [#3 Diminished] : number 3 was normal [#4 Diminished] : number 4 was normal [#5 Diminished] : number 5 was normal [#6 Diminished] : number 6 was normal [#7 Diminished] : number 7 was normal [#8 Diminished] : number 8 was normal [#9 Diminished] : number 9 was normal [#10 Diminished] : number 10 was normal

## 2023-06-12 NOTE — HISTORY OF PRESENT ILLNESS
[FreeTextEntry1] : 83-year-old male with history of hypertension, hyperlipidemia and type 2 diabetes presents for his yearly physical.\par \par He continues to follow with endocrinology  every 3 months.  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, 2018.\par Postoperatively he had atrial fibrillation but converted to sinus rhythm.\par He has been fine since.\par Echo December 2019 showed normal LVEF with no valvular issues.\par Carotid duplex December 2019 showing plaque without stenosis.\par He has followed up with cardiology December 2021 with all being stable\par \par medications as listed\par \par Generally feeling well without complaints of chest pain, palpitations, shortness of breath or edema.\par \par History of arthritic and musculoskeletal multiple joint pains for which he saw rheumatology 2017 and does not want a followup.\par Diagnosis was osteoarthritis.\par Main complaints are increased left shoulder pain and worsening Dupuytren's contractures left hand.\par he had a Dupuytren's contracture corrected with benefit ad received a shot into the left shoulder with benefit.\par \par He does have a mild hand tremor which he states has been stable with no worsening.\par \par The patient is retired with 6 grandchildren

## 2023-06-13 LAB
ALBUMIN SERPL ELPH-MCNC: 4.7 G/DL
ALP BLD-CCNC: 64 U/L
ALT SERPL-CCNC: 23 U/L
ANION GAP SERPL CALC-SCNC: 14 MMOL/L
APPEARANCE: CLEAR
AST SERPL-CCNC: 21 U/L
BACTERIA: NEGATIVE /HPF
BILIRUB SERPL-MCNC: 0.6 MG/DL
BILIRUBIN URINE: NEGATIVE
BLOOD URINE: NEGATIVE
BUN SERPL-MCNC: 23 MG/DL
CALCIUM SERPL-MCNC: 9.9 MG/DL
CAST: 0 /LPF
CHLORIDE SERPL-SCNC: 104 MMOL/L
CHOLEST SERPL-MCNC: 102 MG/DL
CO2 SERPL-SCNC: 25 MMOL/L
COLOR: YELLOW
CREAT SERPL-MCNC: 0.96 MG/DL
CREAT SPEC-SCNC: 104 MG/DL
EGFR: 79 ML/MIN/1.73M2
EPITHELIAL CELLS: 0 /HPF
ESTIMATED AVERAGE GLUCOSE: 154 MG/DL
GLUCOSE QUALITATIVE U: >=1000 MG/DL
GLUCOSE SERPL-MCNC: 112 MG/DL
HBA1C MFR BLD HPLC: 7 %
HDLC SERPL-MCNC: 34 MG/DL
KETONES URINE: NEGATIVE MG/DL
LDLC SERPL CALC-MCNC: 47 MG/DL
LEUKOCYTE ESTERASE URINE: NEGATIVE
MAGNESIUM SERPL-MCNC: 1.9 MG/DL
MICROALBUMIN 24H UR DL<=1MG/L-MCNC: 16.5 MG/DL
MICROALBUMIN/CREAT 24H UR-RTO: 158 MG/G
MICROSCOPIC-UA: NORMAL
NITRITE URINE: NEGATIVE
NONHDLC SERPL-MCNC: 68 MG/DL
PH URINE: 6
POTASSIUM SERPL-SCNC: 4.1 MMOL/L
PROT SERPL-MCNC: 7 G/DL
PROTEIN URINE: 30 MG/DL
RED BLOOD CELLS URINE: 1 /HPF
SODIUM SERPL-SCNC: 143 MMOL/L
SPECIFIC GRAVITY URINE: 1.03
TRIGL SERPL-MCNC: 108 MG/DL
URATE SERPL-MCNC: 4.8 MG/DL
UROBILINOGEN URINE: 0.2 MG/DL
VIT B12 SERPL-MCNC: 598 PG/ML
WHITE BLOOD CELLS URINE: 0 /HPF

## 2023-06-14 ENCOUNTER — NON-APPOINTMENT (OUTPATIENT)
Age: 83
End: 2023-06-14

## 2023-06-20 ENCOUNTER — RX RENEWAL (OUTPATIENT)
Age: 83
End: 2023-06-20

## 2023-06-27 ENCOUNTER — NON-APPOINTMENT (OUTPATIENT)
Age: 83
End: 2023-06-27

## 2023-06-27 LAB
CREAT 24H UR-MCNC: 1.4 G/24 H
CREAT ?TM UR-MCNC: 59 MG/DL
PROT 24H UR-MRATE: 18 MG/DL
PROT ?TM UR-MCNC: 24 HR
PROT UR-MCNC: 418 MG/24 H
SPECIMEN VOL 24H UR: 2325 ML

## 2023-08-10 ENCOUNTER — OFFICE (OUTPATIENT)
Dept: URBAN - METROPOLITAN AREA CLINIC 104 | Facility: CLINIC | Age: 83
Setting detail: OPHTHALMOLOGY
End: 2023-08-10
Payer: MEDICARE

## 2023-08-10 DIAGNOSIS — H01.005: ICD-10-CM

## 2023-08-10 DIAGNOSIS — H01.001: ICD-10-CM

## 2023-08-10 DIAGNOSIS — H01.004: ICD-10-CM

## 2023-08-10 DIAGNOSIS — H40.1132: ICD-10-CM

## 2023-08-10 DIAGNOSIS — H01.002: ICD-10-CM

## 2023-08-10 DIAGNOSIS — E11.9: ICD-10-CM

## 2023-08-10 DIAGNOSIS — H43.813: ICD-10-CM

## 2023-08-10 DIAGNOSIS — Z96.1: ICD-10-CM

## 2023-08-10 PROCEDURE — 92133 CPTRZD OPH DX IMG PST SGM ON: CPT | Performed by: OPHTHALMOLOGY

## 2023-08-10 PROCEDURE — 99213 OFFICE O/P EST LOW 20 MIN: CPT | Performed by: OPHTHALMOLOGY

## 2023-08-10 ASSESSMENT — TONOMETRY
OS_IOP_MMHG: 14
OD_IOP_MMHG: 14

## 2023-08-10 ASSESSMENT — PACHYMETRY
OD_CT_CORRECTION: -1
OS_CT_UM: 559
OS_CT_CORRECTION: -1
OD_CT_UM: 555

## 2023-08-10 ASSESSMENT — LID EXAM ASSESSMENTS
OD_BLEPHARITIS: RLL RUL 2+
OS_BLEPHARITIS: LLL LUL 2+

## 2023-08-10 ASSESSMENT — CONFRONTATIONAL VISUAL FIELD TEST (CVF)
OD_FINDINGS: FULL
OS_FINDINGS: FULL

## 2023-08-16 ENCOUNTER — NON-APPOINTMENT (OUTPATIENT)
Age: 83
End: 2023-08-16

## 2023-08-16 ASSESSMENT — REFRACTION_MANIFEST
OD_AXIS: 100
OS_CYLINDER: -1.50
OS_VA2: 20/20
OD_ADD: +2.50
OS_ADD: +2.50
OD_VA1: 20/20
OD_CYLINDER: -1.50
OD_SPHERE: +1.00
OD_VA2: 20/20
OS_AXIS: 070
OS_VA1: 20/25
OS_SPHERE: +1.00

## 2023-08-16 ASSESSMENT — SPHEQUIV_DERIVED
OS_SPHEQUIV: 0.25
OD_SPHEQUIV: 0.25
OS_SPHEQUIV: 0.25
OD_SPHEQUIV: 0.125

## 2023-08-16 ASSESSMENT — VISUAL ACUITY
OS_BCVA: 20/40
OD_BCVA: 20/30-2

## 2023-08-16 ASSESSMENT — KERATOMETRY
OD_K2POWER_DIOPTERS: 45.73
OD_K1POWER_DIOPTERS: 44.88
OS_K1POWER_DIOPTERS: 45.06
OS_AXISANGLE_DEGREES: 154
OD_AXISANGLE_DEGREES: 021
OS_K2POWER_DIOPTERS: 46.55

## 2023-08-16 ASSESSMENT — REFRACTION_AUTOREFRACTION
OS_CYLINDER: -2.50
OD_CYLINDER: -1.25
OD_SPHERE: +0.75
OS_AXIS: 075
OS_SPHERE: +1.50
OD_AXIS: 105

## 2023-08-16 ASSESSMENT — AXIALLENGTH_DERIVED
OS_AL: 22.68
OS_AL: 22.68
OD_AL: 22.9
OD_AL: 22.86

## 2023-10-16 ENCOUNTER — APPOINTMENT (OUTPATIENT)
Dept: INTERNAL MEDICINE | Facility: CLINIC | Age: 83
End: 2023-10-16
Payer: MEDICARE

## 2023-10-16 VITALS
SYSTOLIC BLOOD PRESSURE: 126 MMHG | RESPIRATION RATE: 13 BRPM | BODY MASS INDEX: 28.45 KG/M2 | WEIGHT: 177 LBS | OXYGEN SATURATION: 98 % | HEART RATE: 82 BPM | DIASTOLIC BLOOD PRESSURE: 72 MMHG | HEIGHT: 66 IN

## 2023-10-16 PROCEDURE — 36415 COLL VENOUS BLD VENIPUNCTURE: CPT

## 2023-10-16 PROCEDURE — 99214 OFFICE O/P EST MOD 30 MIN: CPT | Mod: 25

## 2023-10-16 RX ORDER — INSULIN DEGLUDEC INJECTION 100 U/ML
100 INJECTION, SOLUTION SUBCUTANEOUS
Refills: 0 | Status: ACTIVE | COMMUNITY
Start: 2023-10-16

## 2023-10-16 RX ORDER — INSULIN GLARGINE 100 [IU]/ML
100 INJECTION, SOLUTION SUBCUTANEOUS DAILY
Refills: 0 | Status: DISCONTINUED | COMMUNITY
Start: 2023-06-12 | End: 2023-10-16

## 2023-10-17 LAB
ALBUMIN SERPL ELPH-MCNC: 4.5 G/DL
ALP BLD-CCNC: 64 U/L
ALT SERPL-CCNC: 21 U/L
ANION GAP SERPL CALC-SCNC: 13 MMOL/L
AST SERPL-CCNC: 21 U/L
BASOPHILS # BLD AUTO: 0.07 K/UL
BASOPHILS NFR BLD AUTO: 0.9 %
BILIRUB SERPL-MCNC: 0.5 MG/DL
BUN SERPL-MCNC: 28 MG/DL
CALCIUM SERPL-MCNC: 9.5 MG/DL
CHLORIDE SERPL-SCNC: 105 MMOL/L
CHOLEST SERPL-MCNC: 94 MG/DL
CO2 SERPL-SCNC: 23 MMOL/L
CREAT SERPL-MCNC: 0.92 MG/DL
EGFR: 84 ML/MIN/1.73M2
EOSINOPHIL # BLD AUTO: 0.38 K/UL
EOSINOPHIL NFR BLD AUTO: 4.8 %
ESTIMATED AVERAGE GLUCOSE: 154 MG/DL
GLUCOSE SERPL-MCNC: 102 MG/DL
HBA1C MFR BLD HPLC: 7 %
HCT VFR BLD CALC: 45.3 %
HDLC SERPL-MCNC: 35 MG/DL
HGB BLD-MCNC: 14.8 G/DL
IMM GRANULOCYTES NFR BLD AUTO: 0.4 %
LDLC SERPL CALC-MCNC: 43 MG/DL
LYMPHOCYTES # BLD AUTO: 1.5 K/UL
LYMPHOCYTES NFR BLD AUTO: 19.1 %
MAGNESIUM SERPL-MCNC: 1.7 MG/DL
MAN DIFF?: NORMAL
MCHC RBC-ENTMCNC: 28.9 PG
MCHC RBC-ENTMCNC: 32.7 GM/DL
MCV RBC AUTO: 88.5 FL
MONOCYTES # BLD AUTO: 0.62 K/UL
MONOCYTES NFR BLD AUTO: 7.9 %
NEUTROPHILS # BLD AUTO: 5.24 K/UL
NEUTROPHILS NFR BLD AUTO: 66.9 %
NONHDLC SERPL-MCNC: 59 MG/DL
PLATELET # BLD AUTO: 160 K/UL
POTASSIUM SERPL-SCNC: 3.9 MMOL/L
PROT SERPL-MCNC: 6.7 G/DL
RBC # BLD: 5.12 M/UL
RBC # FLD: 14.8 %
SODIUM SERPL-SCNC: 141 MMOL/L
TRIGL SERPL-MCNC: 81 MG/DL
URATE SERPL-MCNC: 4.3 MG/DL
WBC # FLD AUTO: 7.84 K/UL

## 2023-12-14 ENCOUNTER — NON-APPOINTMENT (OUTPATIENT)
Age: 83
End: 2023-12-14

## 2023-12-14 ENCOUNTER — OFFICE (OUTPATIENT)
Dept: URBAN - METROPOLITAN AREA CLINIC 104 | Facility: CLINIC | Age: 83
Setting detail: OPHTHALMOLOGY
End: 2023-12-14
Payer: MEDICARE

## 2023-12-14 ENCOUNTER — RX RENEWAL (OUTPATIENT)
Age: 83
End: 2023-12-14

## 2023-12-14 DIAGNOSIS — Z96.1: ICD-10-CM

## 2023-12-14 DIAGNOSIS — H43.813: ICD-10-CM

## 2023-12-14 DIAGNOSIS — H40.1132: ICD-10-CM

## 2023-12-14 DIAGNOSIS — H01.005: ICD-10-CM

## 2023-12-14 DIAGNOSIS — H01.001: ICD-10-CM

## 2023-12-14 DIAGNOSIS — H01.004: ICD-10-CM

## 2023-12-14 DIAGNOSIS — H01.002: ICD-10-CM

## 2023-12-14 DIAGNOSIS — E11.9: ICD-10-CM

## 2023-12-14 PROCEDURE — 92014 COMPRE OPH EXAM EST PT 1/>: CPT | Performed by: OPHTHALMOLOGY

## 2023-12-14 PROCEDURE — 92250 FUNDUS PHOTOGRAPHY W/I&R: CPT | Performed by: OPHTHALMOLOGY

## 2023-12-14 ASSESSMENT — REFRACTION_MANIFEST
OS_SPHERE: +1.00
OS_VA1: 20/25
OD_VA1: 20/20
OD_VA2: 20/20
OD_SPHERE: +1.00
OS_AXIS: 070
OS_ADD: +2.50
OS_VA2: 20/20
OD_ADD: +2.50
OS_CYLINDER: -1.50
OD_AXIS: 100
OD_CYLINDER: -1.50

## 2023-12-14 ASSESSMENT — SPHEQUIV_DERIVED
OD_SPHEQUIV: 0.125
OS_SPHEQUIV: 0.25
OS_SPHEQUIV: 0.25
OD_SPHEQUIV: 0.25

## 2023-12-14 ASSESSMENT — REFRACTION_AUTOREFRACTION
OS_SPHERE: +1.50
OD_AXIS: 105
OD_SPHERE: +0.75
OS_AXIS: 075
OD_CYLINDER: -1.25
OS_CYLINDER: -2.50

## 2023-12-14 ASSESSMENT — CONFRONTATIONAL VISUAL FIELD TEST (CVF)
OD_FINDINGS: FULL
OS_FINDINGS: FULL

## 2023-12-14 ASSESSMENT — LID EXAM ASSESSMENTS
OD_BLEPHARITIS: RLL RUL 2+
OS_BLEPHARITIS: LLL LUL 2+

## 2024-01-19 ENCOUNTER — RX RENEWAL (OUTPATIENT)
Age: 84
End: 2024-01-19

## 2024-01-24 NOTE — HISTORY OF PRESENT ILLNESS
[FreeTextEntry1] : Patient presents for followup on hypertension/hyperlipidemia/type 2 diabetes. Patient is fasting for today's labs. Patient is currently on atenolol/Diovan/nifedipine for his hypertension, on lipitor for his hyperlipidemia and is on Tresiba/jardiance/metformin/repaglinide for his type 2 diabetes. -feels well without  complaints

## 2024-01-25 ENCOUNTER — APPOINTMENT (OUTPATIENT)
Dept: INTERNAL MEDICINE | Facility: CLINIC | Age: 84
End: 2024-01-25
Payer: MEDICARE

## 2024-01-25 VITALS
WEIGHT: 179 LBS | BODY MASS INDEX: 28.77 KG/M2 | HEIGHT: 66 IN | HEART RATE: 81 BPM | RESPIRATION RATE: 13 BRPM | DIASTOLIC BLOOD PRESSURE: 72 MMHG | OXYGEN SATURATION: 97 % | SYSTOLIC BLOOD PRESSURE: 120 MMHG

## 2024-01-25 PROCEDURE — 36415 COLL VENOUS BLD VENIPUNCTURE: CPT

## 2024-01-25 PROCEDURE — 99214 OFFICE O/P EST MOD 30 MIN: CPT

## 2024-01-26 LAB
ALBUMIN SERPL ELPH-MCNC: 4.5 G/DL
ALP BLD-CCNC: 65 U/L
ALT SERPL-CCNC: 19 U/L
ANION GAP SERPL CALC-SCNC: 13 MMOL/L
AST SERPL-CCNC: 20 U/L
BASOPHILS # BLD AUTO: 0.07 K/UL
BASOPHILS NFR BLD AUTO: 0.8 %
BILIRUB SERPL-MCNC: 0.4 MG/DL
BUN SERPL-MCNC: 23 MG/DL
CALCIUM SERPL-MCNC: 9.2 MG/DL
CHLORIDE SERPL-SCNC: 102 MMOL/L
CHOLEST SERPL-MCNC: 107 MG/DL
CO2 SERPL-SCNC: 24 MMOL/L
CREAT SERPL-MCNC: 0.95 MG/DL
EGFR: 80 ML/MIN/1.73M2
EOSINOPHIL # BLD AUTO: 0.49 K/UL
EOSINOPHIL NFR BLD AUTO: 5.8 %
ESTIMATED AVERAGE GLUCOSE: 148 MG/DL
GLUCOSE SERPL-MCNC: 99 MG/DL
HBA1C MFR BLD HPLC: 6.8 %
HCT VFR BLD CALC: 44.5 %
HDLC SERPL-MCNC: 32 MG/DL
HGB BLD-MCNC: 14.8 G/DL
IMM GRANULOCYTES NFR BLD AUTO: 0.5 %
LDLC SERPL CALC-MCNC: 53 MG/DL
LYMPHOCYTES # BLD AUTO: 1.79 K/UL
LYMPHOCYTES NFR BLD AUTO: 21.1 %
MAGNESIUM SERPL-MCNC: 1.8 MG/DL
MAN DIFF?: NORMAL
MCHC RBC-ENTMCNC: 28.7 PG
MCHC RBC-ENTMCNC: 33.3 GM/DL
MCV RBC AUTO: 86.4 FL
MONOCYTES # BLD AUTO: 0.72 K/UL
MONOCYTES NFR BLD AUTO: 8.5 %
NEUTROPHILS # BLD AUTO: 5.38 K/UL
NEUTROPHILS NFR BLD AUTO: 63.3 %
NONHDLC SERPL-MCNC: 74 MG/DL
PLATELET # BLD AUTO: 180 K/UL
POTASSIUM SERPL-SCNC: 4.3 MMOL/L
PROT SERPL-MCNC: 6.6 G/DL
RBC # BLD: 5.15 M/UL
RBC # FLD: 15 %
SODIUM SERPL-SCNC: 139 MMOL/L
TRIGL SERPL-MCNC: 119 MG/DL
URATE SERPL-MCNC: 4.6 MG/DL
WBC # FLD AUTO: 8.49 K/UL

## 2024-03-01 ENCOUNTER — RX RENEWAL (OUTPATIENT)
Age: 84
End: 2024-03-01

## 2024-03-01 RX ORDER — VALSARTAN 160 MG/1
160 TABLET, COATED ORAL DAILY
Qty: 90 | Refills: 1 | Status: ACTIVE | COMMUNITY
Start: 2019-11-21

## 2024-03-26 ENCOUNTER — RX RENEWAL (OUTPATIENT)
Age: 84
End: 2024-03-26

## 2024-03-26 RX ORDER — REPAGLINIDE 2 MG/1
2 TABLET ORAL 3 TIMES DAILY
Qty: 270 | Refills: 1 | Status: ACTIVE | COMMUNITY
Start: 2020-02-20

## 2024-04-25 ENCOUNTER — OFFICE (OUTPATIENT)
Dept: URBAN - METROPOLITAN AREA CLINIC 104 | Facility: CLINIC | Age: 84
Setting detail: OPHTHALMOLOGY
End: 2024-04-25
Payer: MEDICARE

## 2024-04-25 DIAGNOSIS — H01.001: ICD-10-CM

## 2024-04-25 DIAGNOSIS — Z96.1: ICD-10-CM

## 2024-04-25 DIAGNOSIS — H01.005: ICD-10-CM

## 2024-04-25 DIAGNOSIS — E11.9: ICD-10-CM

## 2024-04-25 DIAGNOSIS — H43.813: ICD-10-CM

## 2024-04-25 DIAGNOSIS — H01.004: ICD-10-CM

## 2024-04-25 DIAGNOSIS — H40.1132: ICD-10-CM

## 2024-04-25 DIAGNOSIS — H01.002: ICD-10-CM

## 2024-04-25 PROCEDURE — 99213 OFFICE O/P EST LOW 20 MIN: CPT | Performed by: OPHTHALMOLOGY

## 2024-04-25 PROCEDURE — 92083 EXTENDED VISUAL FIELD XM: CPT | Performed by: OPHTHALMOLOGY

## 2024-04-25 ASSESSMENT — LID EXAM ASSESSMENTS
OS_BLEPHARITIS: LLL LUL 2+
OD_BLEPHARITIS: RLL RUL 2+

## 2024-04-29 ENCOUNTER — APPOINTMENT (OUTPATIENT)
Dept: INTERNAL MEDICINE | Facility: CLINIC | Age: 84
End: 2024-04-29
Payer: MEDICARE

## 2024-04-29 VITALS
HEIGHT: 66 IN | WEIGHT: 179 LBS | HEART RATE: 76 BPM | BODY MASS INDEX: 28.77 KG/M2 | DIASTOLIC BLOOD PRESSURE: 80 MMHG | RESPIRATION RATE: 13 BRPM | OXYGEN SATURATION: 98 % | SYSTOLIC BLOOD PRESSURE: 124 MMHG

## 2024-04-29 DIAGNOSIS — E11.9 TYPE 2 DIABETES MELLITUS W/OUT COMPLICATIONS: ICD-10-CM

## 2024-04-29 PROCEDURE — 36415 COLL VENOUS BLD VENIPUNCTURE: CPT

## 2024-04-29 PROCEDURE — G2211 COMPLEX E/M VISIT ADD ON: CPT

## 2024-04-29 PROCEDURE — 99214 OFFICE O/P EST MOD 30 MIN: CPT

## 2024-04-29 NOTE — HISTORY OF PRESENT ILLNESS
[FreeTextEntry1] : Patient presents for followup on hypertension/hyperlipidemia/type 2 diabetes. Patient is fasting for today's labs. Patient is currently on atenolol/Diovan/nifedipine for his hypertension, on lipitor for his hyperlipidemia and is on Tresiba/jardiance/metformin/repaglinide for his type 2 diabetes. -doing good overall

## 2024-04-29 NOTE — ASSESSMENT
[FreeTextEntry1] : Venipuncture done in our office, Labs sent out/further recommendations will be made based on lab results. Patient advised to continue present medications with diet/exercise and specialist followup.Patient will return to the office in 3-4months for CP  Shingrix d/ w pt "had zostavax", +got covid vaccines last colonoscopy was July of 2012 with rec follow up in 3 years, overdue-declines"-FIT test =declines 24-hour urine=6/2023 specialists include 1. Cardiology-Dr. Orosco 2. Ophthalmology-Dr. Santana-12/2023 3. Rheumatology-Dr. Kleiner-declines followup 4.Endocrine-Dr. Veliz 5.Ortho/Hand-Dr. Zamora declines podiatry Hepatitis C Screening 11/17 was negative echo via cardio [place] 6/2023.

## 2024-04-30 LAB
ALBUMIN SERPL ELPH-MCNC: 4.4 G/DL
ALP BLD-CCNC: 69 U/L
ALT SERPL-CCNC: 21 U/L
ANION GAP SERPL CALC-SCNC: 15 MMOL/L
AST SERPL-CCNC: 23 U/L
BASOPHILS # BLD AUTO: 0.08 K/UL
BASOPHILS NFR BLD AUTO: 0.9 %
BILIRUB SERPL-MCNC: 0.3 MG/DL
BUN SERPL-MCNC: 27 MG/DL
CALCIUM SERPL-MCNC: 9.9 MG/DL
CHLORIDE SERPL-SCNC: 105 MMOL/L
CHOLEST SERPL-MCNC: 118 MG/DL
CO2 SERPL-SCNC: 19 MMOL/L
CREAT SERPL-MCNC: 0.97 MG/DL
EGFR: 78 ML/MIN/1.73M2
EOSINOPHIL # BLD AUTO: 0.5 K/UL
EOSINOPHIL NFR BLD AUTO: 5.5 %
ESTIMATED AVERAGE GLUCOSE: 157 MG/DL
GLUCOSE SERPL-MCNC: 145 MG/DL
HBA1C MFR BLD HPLC: 7.1 %
HCT VFR BLD CALC: 43.6 %
HDLC SERPL-MCNC: 33 MG/DL
HGB BLD-MCNC: 14.6 G/DL
IMM GRANULOCYTES NFR BLD AUTO: 0.3 %
LDLC SERPL CALC-MCNC: 63 MG/DL
LYMPHOCYTES # BLD AUTO: 1.82 K/UL
LYMPHOCYTES NFR BLD AUTO: 20.1 %
MAGNESIUM SERPL-MCNC: 1.9 MG/DL
MAN DIFF?: NORMAL
MCHC RBC-ENTMCNC: 28.6 PG
MCHC RBC-ENTMCNC: 33.5 GM/DL
MCV RBC AUTO: 85.5 FL
MONOCYTES # BLD AUTO: 0.67 K/UL
MONOCYTES NFR BLD AUTO: 7.4 %
NEUTROPHILS # BLD AUTO: 5.94 K/UL
NEUTROPHILS NFR BLD AUTO: 65.8 %
NONHDLC SERPL-MCNC: 85 MG/DL
PLATELET # BLD AUTO: 164 K/UL
POTASSIUM SERPL-SCNC: 4.1 MMOL/L
PROT SERPL-MCNC: 6.5 G/DL
RBC # BLD: 5.1 M/UL
RBC # FLD: 15.2 %
SODIUM SERPL-SCNC: 139 MMOL/L
TRIGL SERPL-MCNC: 125 MG/DL
TSH SERPL-ACNC: 1.79 UIU/ML
URATE SERPL-MCNC: 4.2 MG/DL
WBC # FLD AUTO: 9.04 K/UL

## 2024-05-24 ENCOUNTER — RX RENEWAL (OUTPATIENT)
Age: 84
End: 2024-05-24

## 2024-05-24 RX ORDER — ATORVASTATIN CALCIUM 40 MG/1
40 TABLET, FILM COATED ORAL DAILY
Qty: 90 | Refills: 1 | Status: ACTIVE | COMMUNITY
Start: 1900-01-01 | End: 1900-01-01

## 2024-06-10 ENCOUNTER — RX RENEWAL (OUTPATIENT)
Age: 84
End: 2024-06-10

## 2024-07-02 ENCOUNTER — APPOINTMENT (OUTPATIENT)
Dept: INTERNAL MEDICINE | Facility: CLINIC | Age: 84
End: 2024-07-02
Payer: MEDICARE

## 2024-07-02 VITALS
BODY MASS INDEX: 28.93 KG/M2 | HEIGHT: 66 IN | RESPIRATION RATE: 15 BRPM | DIASTOLIC BLOOD PRESSURE: 80 MMHG | WEIGHT: 180 LBS | HEART RATE: 67 BPM | OXYGEN SATURATION: 97 % | SYSTOLIC BLOOD PRESSURE: 148 MMHG

## 2024-07-02 DIAGNOSIS — I10 ESSENTIAL (PRIMARY) HYPERTENSION: ICD-10-CM

## 2024-07-02 DIAGNOSIS — E78.5 HYPERLIPIDEMIA, UNSPECIFIED: ICD-10-CM

## 2024-07-02 DIAGNOSIS — I25.10 ATHEROSCLEROTIC HEART DISEASE OF NATIVE CORONARY ARTERY W/OUT ANGINA PECTORIS: ICD-10-CM

## 2024-07-02 DIAGNOSIS — I48.0 PAROXYSMAL ATRIAL FIBRILLATION: ICD-10-CM

## 2024-07-02 DIAGNOSIS — M10.9 GOUT, UNSPECIFIED: ICD-10-CM

## 2024-07-02 DIAGNOSIS — E66.3 OVERWEIGHT: ICD-10-CM

## 2024-07-02 DIAGNOSIS — E11.65 TYPE 2 DIABETES MELLITUS WITH HYPERGLYCEMIA: ICD-10-CM

## 2024-07-02 DIAGNOSIS — Z79.899 OTHER LONG TERM (CURRENT) DRUG THERAPY: ICD-10-CM

## 2024-07-02 DIAGNOSIS — Z00.00 ENCOUNTER FOR GENERAL ADULT MEDICAL EXAMINATION W/OUT ABNORMAL FINDINGS: ICD-10-CM

## 2024-07-02 PROCEDURE — G0439: CPT

## 2024-07-02 PROCEDURE — 36415 COLL VENOUS BLD VENIPUNCTURE: CPT

## 2024-07-03 LAB
ALBUMIN SERPL ELPH-MCNC: 4.4 G/DL
ALP BLD-CCNC: 67 U/L
ALT SERPL-CCNC: 24 U/L
ANION GAP SERPL CALC-SCNC: 17 MMOL/L
APPEARANCE: CLEAR
AST SERPL-CCNC: 24 U/L
BACTERIA: NEGATIVE /HPF
BASOPHILS # BLD AUTO: 0.06 K/UL
BASOPHILS NFR BLD AUTO: 0.7 %
BILIRUB SERPL-MCNC: 0.4 MG/DL
BILIRUBIN URINE: NEGATIVE
BLOOD URINE: NEGATIVE
BUN SERPL-MCNC: 26 MG/DL
CALCIUM SERPL-MCNC: 9.6 MG/DL
CAST: 0 /LPF
CHLORIDE SERPL-SCNC: 103 MMOL/L
CHOLEST SERPL-MCNC: 105 MG/DL
CO2 SERPL-SCNC: 22 MMOL/L
COLOR: YELLOW
CREAT SERPL-MCNC: 0.94 MG/DL
CREAT SPEC-SCNC: 88 MG/DL
EGFR: 81 ML/MIN/1.73M2
EOSINOPHIL # BLD AUTO: 0.5 K/UL
EOSINOPHIL NFR BLD AUTO: 5.7 %
EPITHELIAL CELLS: 0 /HPF
ESTIMATED AVERAGE GLUCOSE: 148 MG/DL
GLUCOSE QUALITATIVE U: >=1000 MG/DL
GLUCOSE SERPL-MCNC: 91 MG/DL
HBA1C MFR BLD HPLC: 6.8 %
HCT VFR BLD CALC: 43.4 %
HDLC SERPL-MCNC: 35 MG/DL
HGB BLD-MCNC: 14.3 G/DL
IMM GRANULOCYTES NFR BLD AUTO: 0.5 %
KETONES URINE: NEGATIVE MG/DL
LDLC SERPL CALC-MCNC: 55 MG/DL
LEUKOCYTE ESTERASE URINE: NEGATIVE
LYMPHOCYTES # BLD AUTO: 1.68 K/UL
LYMPHOCYTES NFR BLD AUTO: 19 %
MAGNESIUM SERPL-MCNC: 1.8 MG/DL
MAN DIFF?: NORMAL
MCHC RBC-ENTMCNC: 29 PG
MCHC RBC-ENTMCNC: 32.9 GM/DL
MCV RBC AUTO: 88 FL
MICROALBUMIN 24H UR DL<=1MG/L-MCNC: 17.8 MG/DL
MICROALBUMIN/CREAT 24H UR-RTO: 202 MG/G
MICROSCOPIC-UA: NORMAL
MONOCYTES # BLD AUTO: 0.71 K/UL
MONOCYTES NFR BLD AUTO: 8 %
NEUTROPHILS # BLD AUTO: 5.85 K/UL
NEUTROPHILS NFR BLD AUTO: 66.1 %
NITRITE URINE: NEGATIVE
NONHDLC SERPL-MCNC: 69 MG/DL
PH URINE: 6
PLATELET # BLD AUTO: 174 K/UL
POTASSIUM SERPL-SCNC: 4 MMOL/L
PROT SERPL-MCNC: 6.9 G/DL
PROTEIN URINE: 30 MG/DL
RBC # BLD: 4.93 M/UL
RBC # FLD: 15.7 %
RED BLOOD CELLS URINE: 2 /HPF
SODIUM SERPL-SCNC: 142 MMOL/L
SPECIFIC GRAVITY URINE: >1.03
TRIGL SERPL-MCNC: 66 MG/DL
URATE SERPL-MCNC: 4.6 MG/DL
UROBILINOGEN URINE: 0.2 MG/DL
VIT B12 SERPL-MCNC: 617 PG/ML
WBC # FLD AUTO: 8.84 K/UL
WHITE BLOOD CELLS URINE: 0 /HPF

## 2024-07-05 DIAGNOSIS — R80.9 PROTEINURIA, UNSPECIFIED: ICD-10-CM

## 2024-07-06 LAB
CREAT 24H UR-MCNC: 1.3 G/24 H
CREAT ?TM UR-MCNC: 46 MG/DL
PROT 24H UR-MRATE: 16 MG/DL
PROT ?TM UR-MCNC: 24 HR
PROT UR-MCNC: 464 MG/24 H
SPECIMEN VOL 24H UR: 2900 ML

## 2024-07-15 ENCOUNTER — RX RENEWAL (OUTPATIENT)
Age: 84
End: 2024-07-15

## 2024-08-22 ENCOUNTER — OFFICE (OUTPATIENT)
Dept: URBAN - METROPOLITAN AREA CLINIC 104 | Facility: CLINIC | Age: 84
Setting detail: OPHTHALMOLOGY
End: 2024-08-22
Payer: MEDICARE

## 2024-08-22 DIAGNOSIS — H01.004: ICD-10-CM

## 2024-08-22 DIAGNOSIS — H01.002: ICD-10-CM

## 2024-08-22 DIAGNOSIS — H01.001: ICD-10-CM

## 2024-08-22 DIAGNOSIS — H40.1132: ICD-10-CM

## 2024-08-22 PROCEDURE — 92133 CPTRZD OPH DX IMG PST SGM ON: CPT | Performed by: OPHTHALMOLOGY

## 2024-08-22 PROCEDURE — 99213 OFFICE O/P EST LOW 20 MIN: CPT | Performed by: OPHTHALMOLOGY

## 2024-08-22 ASSESSMENT — LID EXAM ASSESSMENTS
OS_BLEPHARITIS: LLL LUL 2+
OD_BLEPHARITIS: RLL RUL 2+

## 2024-08-22 ASSESSMENT — CONFRONTATIONAL VISUAL FIELD TEST (CVF)
OS_FINDINGS: FULL
OD_FINDINGS: FULL

## 2024-08-30 ENCOUNTER — RX RENEWAL (OUTPATIENT)
Age: 84
End: 2024-08-30

## 2024-08-30 ENCOUNTER — NON-APPOINTMENT (OUTPATIENT)
Age: 84
End: 2024-08-30

## 2024-09-03 ENCOUNTER — NON-APPOINTMENT (OUTPATIENT)
Age: 84
End: 2024-09-03

## 2024-09-23 ENCOUNTER — RX RENEWAL (OUTPATIENT)
Age: 84
End: 2024-09-23

## 2024-09-27 ENCOUNTER — RX RENEWAL (OUTPATIENT)
Age: 84
End: 2024-09-27

## 2024-10-04 ENCOUNTER — APPOINTMENT (OUTPATIENT)
Dept: INTERNAL MEDICINE | Facility: CLINIC | Age: 84
End: 2024-10-04

## 2024-10-04 VITALS
RESPIRATION RATE: 14 BRPM | WEIGHT: 179 LBS | BODY MASS INDEX: 28.89 KG/M2 | DIASTOLIC BLOOD PRESSURE: 70 MMHG | HEART RATE: 72 BPM | SYSTOLIC BLOOD PRESSURE: 120 MMHG

## 2024-10-04 DIAGNOSIS — E11.9 TYPE 2 DIABETES MELLITUS W/OUT COMPLICATIONS: ICD-10-CM

## 2024-10-04 DIAGNOSIS — I25.10 ATHEROSCLEROTIC HEART DISEASE OF NATIVE CORONARY ARTERY W/OUT ANGINA PECTORIS: ICD-10-CM

## 2024-10-04 DIAGNOSIS — Z23 ENCOUNTER FOR IMMUNIZATION: ICD-10-CM

## 2024-10-04 DIAGNOSIS — M10.9 GOUT, UNSPECIFIED: ICD-10-CM

## 2024-10-04 DIAGNOSIS — E78.5 HYPERLIPIDEMIA, UNSPECIFIED: ICD-10-CM

## 2024-10-04 DIAGNOSIS — Z79.899 OTHER LONG TERM (CURRENT) DRUG THERAPY: ICD-10-CM

## 2024-10-04 DIAGNOSIS — I10 ESSENTIAL (PRIMARY) HYPERTENSION: ICD-10-CM

## 2024-10-04 PROCEDURE — 90662 IIV NO PRSV INCREASED AG IM: CPT

## 2024-10-04 PROCEDURE — 36415 COLL VENOUS BLD VENIPUNCTURE: CPT

## 2024-10-04 PROCEDURE — 99214 OFFICE O/P EST MOD 30 MIN: CPT | Mod: 25

## 2024-10-04 PROCEDURE — G0008: CPT

## 2024-10-04 NOTE — ASSESSMENT
[FreeTextEntry1] : HD Flu vaccine given without side effects or reaction.Venipuncture done in our office, Labs sent out/further recommendations will be made based on lab results. Patient advised to continue present medications with diet/exercise and specialist followup.Patient will return to the office in 3-4months   Shingrix d/ w pt "had zostavax", +got covid vaccines last colonoscopy was July of 2012 with rec follow up in 3 years, overdue-declines"-FIT test =declines 24-hour urine=7/2024 specialists include 1. Cardiology-Dr. Orosco 2. Ophthalmology-Dr. Santana-8/2024 3. Rheumatology-Dr. Kleiner-declines followup 4.Endocrine-Dr. Veliz 5.Ortho/Hand-Dr. Zamora declines podiatry Hepatitis C Screening 11/17 was negative echo via cardio=8/2024 Carotid sonogram by cardiology 8/2024 [place] 7/2024

## 2024-10-05 LAB
ALBUMIN SERPL ELPH-MCNC: 4.5 G/DL
ALP BLD-CCNC: 64 U/L
ALT SERPL-CCNC: 19 U/L
ANION GAP SERPL CALC-SCNC: 14 MMOL/L
AST SERPL-CCNC: 20 U/L
BASOPHILS # BLD AUTO: 0.07 K/UL
BASOPHILS NFR BLD AUTO: 0.8 %
BILIRUB SERPL-MCNC: 0.3 MG/DL
BUN SERPL-MCNC: 23 MG/DL
CALCIUM SERPL-MCNC: 9.4 MG/DL
CHLORIDE SERPL-SCNC: 103 MMOL/L
CHOLEST SERPL-MCNC: 110 MG/DL
CO2 SERPL-SCNC: 22 MMOL/L
CREAT SERPL-MCNC: 0.93 MG/DL
EGFR: 82 ML/MIN/1.73M2
EOSINOPHIL # BLD AUTO: 0.51 K/UL
EOSINOPHIL NFR BLD AUTO: 5.8 %
ESTIMATED AVERAGE GLUCOSE: 157 MG/DL
GLUCOSE SERPL-MCNC: 120 MG/DL
HBA1C MFR BLD HPLC: 7.1 %
HCT VFR BLD CALC: 46.2 %
HDLC SERPL-MCNC: 33 MG/DL
HGB BLD-MCNC: 15.4 G/DL
IMM GRANULOCYTES NFR BLD AUTO: 0.6 %
LDLC SERPL CALC-MCNC: 52 MG/DL
LYMPHOCYTES # BLD AUTO: 1.58 K/UL
LYMPHOCYTES NFR BLD AUTO: 18 %
MAN DIFF?: NORMAL
MCHC RBC-ENTMCNC: 29.4 PG
MCHC RBC-ENTMCNC: 33.3 GM/DL
MCV RBC AUTO: 88.3 FL
MONOCYTES # BLD AUTO: 0.75 K/UL
MONOCYTES NFR BLD AUTO: 8.5 %
NEUTROPHILS # BLD AUTO: 5.82 K/UL
NEUTROPHILS NFR BLD AUTO: 66.3 %
NONHDLC SERPL-MCNC: 76 MG/DL
PLATELET # BLD AUTO: 163 K/UL
POTASSIUM SERPL-SCNC: 4.2 MMOL/L
PROT SERPL-MCNC: 6.6 G/DL
RBC # BLD: 5.23 M/UL
RBC # FLD: 15 %
SODIUM SERPL-SCNC: 140 MMOL/L
TRIGL SERPL-MCNC: 140 MG/DL
URATE SERPL-MCNC: 4.3 MG/DL
WBC # FLD AUTO: 8.78 K/UL

## 2024-10-16 ENCOUNTER — RX RENEWAL (OUTPATIENT)
Age: 84
End: 2024-10-16

## 2025-01-17 ENCOUNTER — RX ONLY (RX ONLY)
Age: 85
End: 2025-01-17

## 2025-01-17 ENCOUNTER — NON-APPOINTMENT (OUTPATIENT)
Age: 85
End: 2025-01-17

## 2025-01-17 ENCOUNTER — OFFICE (OUTPATIENT)
Dept: URBAN - METROPOLITAN AREA CLINIC 104 | Facility: CLINIC | Age: 85
Setting detail: OPHTHALMOLOGY
End: 2025-01-17
Payer: MEDICARE

## 2025-01-17 DIAGNOSIS — H40.1132: ICD-10-CM

## 2025-01-17 DIAGNOSIS — Z96.1: ICD-10-CM

## 2025-01-17 DIAGNOSIS — H16.223: ICD-10-CM

## 2025-01-17 DIAGNOSIS — H01.002: ICD-10-CM

## 2025-01-17 DIAGNOSIS — H01.001: ICD-10-CM

## 2025-01-17 DIAGNOSIS — E11.3291: ICD-10-CM

## 2025-01-17 DIAGNOSIS — H43.813: ICD-10-CM

## 2025-01-17 DIAGNOSIS — H01.005: ICD-10-CM

## 2025-01-17 DIAGNOSIS — E11.9: ICD-10-CM

## 2025-01-17 DIAGNOSIS — H26.493: ICD-10-CM

## 2025-01-17 DIAGNOSIS — H01.004: ICD-10-CM

## 2025-01-17 PROCEDURE — 92250 FUNDUS PHOTOGRAPHY W/I&R: CPT | Performed by: OPHTHALMOLOGY

## 2025-01-17 PROCEDURE — 92014 COMPRE OPH EXAM EST PT 1/>: CPT | Performed by: OPHTHALMOLOGY

## 2025-01-17 ASSESSMENT — REFRACTION_AUTOREFRACTION
OD_AXIS: 97
OS_AXIS: 69
OS_SPHERE: +0.75
OD_CYLINDER: -1.50
OD_SPHERE: +0.75
OS_CYLINDER: -1.50

## 2025-01-17 ASSESSMENT — PACHYMETRY
OS_CT_CORRECTION: -1
OD_CT_CORRECTION: -1
OS_CT_UM: 559
OD_CT_UM: 555

## 2025-01-17 ASSESSMENT — KERATOMETRY
OS_K2POWER_DIOPTERS: 47.27
OD_K2POWER_DIOPTERS: 45.92
OD_K1POWER_DIOPTERS: 45.00
OS_AXISANGLE_DEGREES: 112
OS_K1POWER_DIOPTERS: 45.92
OD_AXISANGLE_DEGREES: 35

## 2025-01-17 ASSESSMENT — LID EXAM ASSESSMENTS
OS_BLEPHARITIS: LLL LUL 2+
OD_BLEPHARITIS: RLL RUL 2+

## 2025-01-17 ASSESSMENT — VISUAL ACUITY
OD_BCVA: 20/40
OS_BCVA: 20/30

## 2025-01-17 ASSESSMENT — CONFRONTATIONAL VISUAL FIELD TEST (CVF)
OD_FINDINGS: FULL
OS_FINDINGS: FULL

## 2025-01-17 ASSESSMENT — REFRACTION_CURRENTRX
OS_OVR_VA: 20/
OD_OVR_VA: 20/

## 2025-01-17 ASSESSMENT — SUPERFICIAL PUNCTATE KERATITIS (SPK)
OD_SPK: 2+ 3+
OS_SPK: 2+ 3+

## 2025-01-17 ASSESSMENT — TONOMETRY: OS_IOP_MMHG: 10

## 2025-01-24 ENCOUNTER — APPOINTMENT (OUTPATIENT)
Dept: INTERNAL MEDICINE | Facility: CLINIC | Age: 85
End: 2025-01-24
Payer: MEDICARE

## 2025-01-24 VITALS
WEIGHT: 181 LBS | DIASTOLIC BLOOD PRESSURE: 75 MMHG | RESPIRATION RATE: 13 BRPM | BODY MASS INDEX: 29.09 KG/M2 | SYSTOLIC BLOOD PRESSURE: 120 MMHG | HEIGHT: 66 IN | OXYGEN SATURATION: 96 % | HEART RATE: 75 BPM

## 2025-01-24 DIAGNOSIS — E11.9 TYPE 2 DIABETES MELLITUS W/OUT COMPLICATIONS: ICD-10-CM

## 2025-01-24 DIAGNOSIS — Z79.899 OTHER LONG TERM (CURRENT) DRUG THERAPY: ICD-10-CM

## 2025-01-24 DIAGNOSIS — E78.5 HYPERLIPIDEMIA, UNSPECIFIED: ICD-10-CM

## 2025-01-24 DIAGNOSIS — I25.10 ATHEROSCLEROTIC HEART DISEASE OF NATIVE CORONARY ARTERY W/OUT ANGINA PECTORIS: ICD-10-CM

## 2025-01-24 DIAGNOSIS — I10 ESSENTIAL (PRIMARY) HYPERTENSION: ICD-10-CM

## 2025-01-24 DIAGNOSIS — E66.3 OVERWEIGHT: ICD-10-CM

## 2025-01-24 DIAGNOSIS — M10.9 GOUT, UNSPECIFIED: ICD-10-CM

## 2025-01-24 PROCEDURE — 99214 OFFICE O/P EST MOD 30 MIN: CPT

## 2025-01-24 PROCEDURE — 36415 COLL VENOUS BLD VENIPUNCTURE: CPT

## 2025-01-24 PROCEDURE — G2211 COMPLEX E/M VISIT ADD ON: CPT

## 2025-01-25 LAB
ALBUMIN SERPL ELPH-MCNC: 4.5 G/DL
ALP BLD-CCNC: 66 U/L
ALT SERPL-CCNC: 19 U/L
ANION GAP SERPL CALC-SCNC: 13 MMOL/L
AST SERPL-CCNC: 16 U/L
BASOPHILS # BLD AUTO: 0.1 K/UL
BASOPHILS NFR BLD AUTO: 1 %
BILIRUB SERPL-MCNC: 0.4 MG/DL
BUN SERPL-MCNC: 26 MG/DL
CALCIUM SERPL-MCNC: 10.2 MG/DL
CHLORIDE SERPL-SCNC: 103 MMOL/L
CHOLEST SERPL-MCNC: 115 MG/DL
CO2 SERPL-SCNC: 25 MMOL/L
CREAT SERPL-MCNC: 1.02 MG/DL
EGFR: 73 ML/MIN/1.73M2
EOSINOPHIL # BLD AUTO: 0.61 K/UL
EOSINOPHIL NFR BLD AUTO: 5.9 %
ESTIMATED AVERAGE GLUCOSE: 157 MG/DL
GLUCOSE SERPL-MCNC: 174 MG/DL
HBA1C MFR BLD HPLC: 7.1 %
HCT VFR BLD CALC: 47.6 %
HDLC SERPL-MCNC: 30 MG/DL
HGB BLD-MCNC: 15.6 G/DL
IMM GRANULOCYTES NFR BLD AUTO: 0.6 %
LDLC SERPL CALC-MCNC: 57 MG/DL
LYMPHOCYTES # BLD AUTO: 2.09 K/UL
LYMPHOCYTES NFR BLD AUTO: 20.2 %
MAN DIFF?: NORMAL
MCHC RBC-ENTMCNC: 29.4 PG
MCHC RBC-ENTMCNC: 32.8 G/DL
MCV RBC AUTO: 89.8 FL
MONOCYTES # BLD AUTO: 0.9 K/UL
MONOCYTES NFR BLD AUTO: 8.7 %
NEUTROPHILS # BLD AUTO: 6.61 K/UL
NEUTROPHILS NFR BLD AUTO: 63.6 %
NONHDLC SERPL-MCNC: 85 MG/DL
PLATELET # BLD AUTO: 180 K/UL
POTASSIUM SERPL-SCNC: 5.1 MMOL/L
PROT SERPL-MCNC: 6.9 G/DL
RBC # BLD: 5.3 M/UL
RBC # FLD: 14.9 %
SODIUM SERPL-SCNC: 141 MMOL/L
TRIGL SERPL-MCNC: 167 MG/DL
URATE SERPL-MCNC: 4.8 MG/DL
WBC # FLD AUTO: 10.37 K/UL

## 2025-03-18 ENCOUNTER — RX RENEWAL (OUTPATIENT)
Age: 85
End: 2025-03-18

## 2025-04-02 ENCOUNTER — RX RENEWAL (OUTPATIENT)
Age: 85
End: 2025-04-02

## 2025-04-25 ENCOUNTER — APPOINTMENT (OUTPATIENT)
Dept: INTERNAL MEDICINE | Facility: CLINIC | Age: 85
End: 2025-04-25
Payer: MEDICARE

## 2025-04-25 VITALS
RESPIRATION RATE: 14 BRPM | SYSTOLIC BLOOD PRESSURE: 125 MMHG | OXYGEN SATURATION: 97 % | TEMPERATURE: 97.3 F | BODY MASS INDEX: 28.77 KG/M2 | DIASTOLIC BLOOD PRESSURE: 75 MMHG | HEIGHT: 66 IN | WEIGHT: 179 LBS | HEART RATE: 76 BPM

## 2025-04-25 DIAGNOSIS — E66.3 OVERWEIGHT: ICD-10-CM

## 2025-04-25 DIAGNOSIS — M10.9 GOUT, UNSPECIFIED: ICD-10-CM

## 2025-04-25 DIAGNOSIS — I25.10 ATHEROSCLEROTIC HEART DISEASE OF NATIVE CORONARY ARTERY W/OUT ANGINA PECTORIS: ICD-10-CM

## 2025-04-25 DIAGNOSIS — E11.9 TYPE 2 DIABETES MELLITUS W/OUT COMPLICATIONS: ICD-10-CM

## 2025-04-25 DIAGNOSIS — I10 ESSENTIAL (PRIMARY) HYPERTENSION: ICD-10-CM

## 2025-04-25 DIAGNOSIS — E78.5 HYPERLIPIDEMIA, UNSPECIFIED: ICD-10-CM

## 2025-04-25 DIAGNOSIS — Z79.899 OTHER LONG TERM (CURRENT) DRUG THERAPY: ICD-10-CM

## 2025-04-25 PROCEDURE — 99214 OFFICE O/P EST MOD 30 MIN: CPT

## 2025-04-25 PROCEDURE — 36415 COLL VENOUS BLD VENIPUNCTURE: CPT

## 2025-04-25 PROCEDURE — G2211 COMPLEX E/M VISIT ADD ON: CPT

## 2025-04-28 LAB
ALBUMIN SERPL ELPH-MCNC: 4.5 G/DL
ALP BLD-CCNC: 68 U/L
ALT SERPL-CCNC: 19 U/L
ANION GAP SERPL CALC-SCNC: 18 MMOL/L
AST SERPL-CCNC: 20 U/L
BASOPHILS # BLD AUTO: 0.06 K/UL
BASOPHILS NFR BLD AUTO: 0.7 %
BILIRUB SERPL-MCNC: 0.4 MG/DL
BUN SERPL-MCNC: 22 MG/DL
CALCIUM SERPL-MCNC: 9.3 MG/DL
CHLORIDE SERPL-SCNC: 104 MMOL/L
CHOLEST SERPL-MCNC: 103 MG/DL
CO2 SERPL-SCNC: 20 MMOL/L
CREAT SERPL-MCNC: 1.03 MG/DL
EGFRCR SERPLBLD CKD-EPI 2021: 72 ML/MIN/1.73M2
EOSINOPHIL # BLD AUTO: 0.49 K/UL
EOSINOPHIL NFR BLD AUTO: 5.6 %
ESTIMATED AVERAGE GLUCOSE: 186 MG/DL
GLUCOSE SERPL-MCNC: 117 MG/DL
HBA1C MFR BLD HPLC: 8.1 %
HCT VFR BLD CALC: 45.5 %
HDLC SERPL-MCNC: 33 MG/DL
HGB BLD-MCNC: 14.6 G/DL
IMM GRANULOCYTES NFR BLD AUTO: 0.5 %
LDLC SERPL-MCNC: 52 MG/DL
LYMPHOCYTES # BLD AUTO: 1.72 K/UL
LYMPHOCYTES NFR BLD AUTO: 19.8 %
MAN DIFF?: NORMAL
MCHC RBC-ENTMCNC: 28.7 PG
MCHC RBC-ENTMCNC: 32.1 G/DL
MCV RBC AUTO: 89.4 FL
MONOCYTES # BLD AUTO: 0.7 K/UL
MONOCYTES NFR BLD AUTO: 8.1 %
NEUTROPHILS # BLD AUTO: 5.67 K/UL
NEUTROPHILS NFR BLD AUTO: 65.3 %
NONHDLC SERPL-MCNC: 71 MG/DL
PLATELET # BLD AUTO: 182 K/UL
POTASSIUM SERPL-SCNC: 4 MMOL/L
PROT SERPL-MCNC: 6.9 G/DL
RBC # BLD: 5.09 M/UL
RBC # FLD: 15.2 %
SODIUM SERPL-SCNC: 143 MMOL/L
TRIGL SERPL-MCNC: 95 MG/DL
URATE SERPL-MCNC: 5.1 MG/DL
WBC # FLD AUTO: 8.68 K/UL

## 2025-05-02 ENCOUNTER — RX RENEWAL (OUTPATIENT)
Age: 85
End: 2025-05-02

## 2025-05-07 ENCOUNTER — RX RENEWAL (OUTPATIENT)
Age: 85
End: 2025-05-07

## 2025-05-16 ENCOUNTER — OFFICE (OUTPATIENT)
Dept: URBAN - METROPOLITAN AREA CLINIC 104 | Facility: CLINIC | Age: 85
Setting detail: OPHTHALMOLOGY
End: 2025-05-16
Payer: MEDICARE

## 2025-05-16 ENCOUNTER — NON-APPOINTMENT (OUTPATIENT)
Age: 85
End: 2025-05-16

## 2025-05-16 DIAGNOSIS — H16.223: ICD-10-CM

## 2025-05-16 DIAGNOSIS — H01.004: ICD-10-CM

## 2025-05-16 DIAGNOSIS — E11.9: ICD-10-CM

## 2025-05-16 DIAGNOSIS — Z96.1: ICD-10-CM

## 2025-05-16 DIAGNOSIS — H40.1132: ICD-10-CM

## 2025-05-16 DIAGNOSIS — H43.813: ICD-10-CM

## 2025-05-16 DIAGNOSIS — H26.493: ICD-10-CM

## 2025-05-16 DIAGNOSIS — H01.001: ICD-10-CM

## 2025-05-16 DIAGNOSIS — E11.3291: ICD-10-CM

## 2025-05-16 DIAGNOSIS — H01.002: ICD-10-CM

## 2025-05-16 DIAGNOSIS — H01.005: ICD-10-CM

## 2025-05-16 PROCEDURE — 92083 EXTENDED VISUAL FIELD XM: CPT | Performed by: OPHTHALMOLOGY

## 2025-05-16 PROCEDURE — 92012 INTRM OPH EXAM EST PATIENT: CPT | Performed by: OPHTHALMOLOGY

## 2025-05-16 ASSESSMENT — KERATOMETRY
OS_AXISANGLE_DEGREES: 166
OS_K1POWER_DIOPTERS: 44.76
OD_K2POWER_DIOPTERS: 46.04
OS_K2POWER_DIOPTERS: 46.88
OD_K1POWER_DIOPTERS: 45.24
OD_AXISANGLE_DEGREES: 35

## 2025-05-16 ASSESSMENT — REFRACTION_AUTOREFRACTION
OD_CYLINDER: -0.75
OS_AXIS: 070
OD_AXIS: 104
OD_SPHERE: -0.25
OS_SPHERE: +0.50
OS_CYLINDER: -1.50

## 2025-05-16 ASSESSMENT — LID EXAM ASSESSMENTS
OD_BLEPHARITIS: RLL RUL 2+
OS_BLEPHARITIS: LLL LUL 2+

## 2025-05-16 ASSESSMENT — VISUAL ACUITY
OD_BCVA: 20/50-2
OS_BCVA: 20/40

## 2025-05-16 ASSESSMENT — REFRACTION_CURRENTRX
OS_OVR_VA: 20/
OD_OVR_VA: 20/

## 2025-05-16 ASSESSMENT — CONFRONTATIONAL VISUAL FIELD TEST (CVF)
OD_FINDINGS: FULL
OS_FINDINGS: FULL

## 2025-05-16 ASSESSMENT — SUPERFICIAL PUNCTATE KERATITIS (SPK)
OD_SPK: 2+ 3+
OS_SPK: 2+ 3+

## 2025-06-30 ENCOUNTER — RX RENEWAL (OUTPATIENT)
Age: 85
End: 2025-06-30

## 2025-07-08 ENCOUNTER — NON-APPOINTMENT (OUTPATIENT)
Age: 85
End: 2025-07-08

## 2025-07-09 ENCOUNTER — APPOINTMENT (OUTPATIENT)
Dept: INTERNAL MEDICINE | Facility: CLINIC | Age: 85
End: 2025-07-09
Payer: MEDICARE

## 2025-07-09 VITALS
SYSTOLIC BLOOD PRESSURE: 114 MMHG | RESPIRATION RATE: 15 BRPM | OXYGEN SATURATION: 98 % | HEIGHT: 66 IN | HEART RATE: 71 BPM | WEIGHT: 171 LBS | DIASTOLIC BLOOD PRESSURE: 62 MMHG | BODY MASS INDEX: 27.48 KG/M2

## 2025-07-09 PROBLEM — I48.0 PAF (PAROXYSMAL ATRIAL FIBRILLATION): Status: RESOLVED | Noted: 2018-06-22 | Resolved: 2025-07-09

## 2025-07-09 PROCEDURE — G0439: CPT

## 2025-07-09 PROCEDURE — 36415 COLL VENOUS BLD VENIPUNCTURE: CPT

## 2025-07-09 RX ORDER — SEMAGLUTIDE 0.68 MG/ML
2 INJECTION, SOLUTION SUBCUTANEOUS
Qty: 1 | Refills: 2 | Status: ACTIVE | COMMUNITY
Start: 2025-07-09

## 2025-07-10 PROBLEM — R97.20 PSA ELEVATION: Status: ACTIVE | Noted: 2025-07-10

## 2025-07-10 PROBLEM — D72.829 LEUKOCYTOSIS: Status: ACTIVE | Noted: 2025-07-10

## 2025-07-10 LAB
ALBUMIN SERPL ELPH-MCNC: 4.5 G/DL
ALP BLD-CCNC: 75 U/L
ALT SERPL-CCNC: 24 U/L
ANION GAP SERPL CALC-SCNC: 19 MMOL/L
APPEARANCE: CLEAR
AST SERPL-CCNC: 22 U/L
BACTERIA: NEGATIVE /HPF
BASOPHILS # BLD AUTO: 0.12 K/UL
BASOPHILS NFR BLD AUTO: 1 %
BILIRUB SERPL-MCNC: 0.4 MG/DL
BILIRUBIN URINE: NEGATIVE
BLOOD URINE: NEGATIVE
BUN SERPL-MCNC: 23 MG/DL
CALCIUM SERPL-MCNC: 9.8 MG/DL
CAST: 0 /LPF
CHLORIDE SERPL-SCNC: 100 MMOL/L
CHOLEST SERPL-MCNC: 94 MG/DL
CO2 SERPL-SCNC: 22 MMOL/L
COLOR: YELLOW
CREAT SERPL-MCNC: 0.92 MG/DL
CREAT SPEC-SCNC: 97 MG/DL
EGFRCR SERPLBLD CKD-EPI 2021: 83 ML/MIN/1.73M2
EOSINOPHIL # BLD AUTO: 1.12 K/UL
EOSINOPHIL NFR BLD AUTO: 9.3 %
EPITHELIAL CELLS: 0 /HPF
ESTIMATED AVERAGE GLUCOSE: 140 MG/DL
GLUCOSE QUALITATIVE U: >=1000 MG/DL
GLUCOSE SERPL-MCNC: 119 MG/DL
HBA1C MFR BLD HPLC: 6.5 %
HCT VFR BLD CALC: 48.2 %
HDLC SERPL-MCNC: 28 MG/DL
HGB BLD-MCNC: 15.2 G/DL
IMM GRANULOCYTES NFR BLD AUTO: 0.7 %
KETONES URINE: NEGATIVE MG/DL
LDLC SERPL-MCNC: 45 MG/DL
LEUKOCYTE ESTERASE URINE: NEGATIVE
LYMPHOCYTES # BLD AUTO: 2.32 K/UL
LYMPHOCYTES NFR BLD AUTO: 19.3 %
MAGNESIUM SERPL-MCNC: 1.7 MG/DL
MAN DIFF?: NORMAL
MCHC RBC-ENTMCNC: 28.7 PG
MCHC RBC-ENTMCNC: 31.5 G/DL
MCV RBC AUTO: 91.1 FL
MICROALBUMIN 24H UR DL<=1MG/L-MCNC: 9.8 MG/DL
MICROALBUMIN/CREAT 24H UR-RTO: 101 MG/G
MICROSCOPIC-UA: NORMAL
MONOCYTES # BLD AUTO: 0.96 K/UL
MONOCYTES NFR BLD AUTO: 8 %
NEUTROPHILS # BLD AUTO: 7.41 K/UL
NEUTROPHILS NFR BLD AUTO: 61.7 %
NITRITE URINE: NEGATIVE
NONHDLC SERPL-MCNC: 66 MG/DL
PH URINE: 6
PLATELET # BLD AUTO: 229 K/UL
POTASSIUM SERPL-SCNC: 4 MMOL/L
PROT SERPL-MCNC: 7 G/DL
PROTEIN URINE: 30 MG/DL
PSA SERPL-MCNC: 4.68 NG/ML
RBC # BLD: 5.29 M/UL
RBC # FLD: 15.3 %
RED BLOOD CELLS URINE: 0 /HPF
SODIUM SERPL-SCNC: 141 MMOL/L
SPECIFIC GRAVITY URINE: 1.03
TRIGL SERPL-MCNC: 113 MG/DL
URATE SERPL-MCNC: 3.9 MG/DL
UROBILINOGEN URINE: 1 MG/DL
VIT B12 SERPL-MCNC: 465 PG/ML
WBC # FLD AUTO: 12.01 K/UL
WHITE BLOOD CELLS URINE: 0 /HPF

## 2025-07-13 LAB
CREAT 24H UR-MCNC: 1.1 G/24 HR
CREAT ?TM UR-MCNC: 38 MG/DL
PROT 24H UR-MRATE: 13 MG/DL
PROT ?TM UR-MCNC: 24 HR
PROT UR-MCNC: 370 MG/24HR
SPECIMEN VOL 24H UR: 2850 ML

## 2025-09-08 ENCOUNTER — RX RENEWAL (OUTPATIENT)
Age: 85
End: 2025-09-08